# Patient Record
Sex: FEMALE | Race: WHITE | Employment: UNEMPLOYED | URBAN - METROPOLITAN AREA
[De-identification: names, ages, dates, MRNs, and addresses within clinical notes are randomized per-mention and may not be internally consistent; named-entity substitution may affect disease eponyms.]

---

## 2020-05-31 ENCOUNTER — APPOINTMENT (OUTPATIENT)
Dept: CT IMAGING | Facility: HOSPITAL | Age: 50
DRG: 330 | End: 2020-05-31
Attending: EMERGENCY MEDICINE
Payer: MEDICAID

## 2020-05-31 ENCOUNTER — HOSPITAL ENCOUNTER (INPATIENT)
Facility: HOSPITAL | Age: 50
LOS: 8 days | Discharge: HOME | DRG: 330 | End: 2020-06-08
Attending: EMERGENCY MEDICINE | Admitting: HOSPITALIST
Payer: MEDICAID

## 2020-05-31 DIAGNOSIS — K63.89 COLONIC MASS: ICD-10-CM

## 2020-05-31 DIAGNOSIS — R10.9 ABDOMINAL PAIN OF UNKNOWN ETIOLOGY: Primary | ICD-10-CM

## 2020-05-31 PROCEDURE — 74177 CT ABD & PELVIS W/CONTRAST: CPT | Performed by: EMERGENCY MEDICINE

## 2020-05-31 PROCEDURE — 99253 IP/OBS CNSLTJ NEW/EST LOW 45: CPT | Performed by: INTERNAL MEDICINE

## 2020-05-31 PROCEDURE — 99223 1ST HOSP IP/OBS HIGH 75: CPT | Performed by: HOSPITALIST

## 2020-05-31 RX ORDER — POLYETHYLENE GLYCOL 3350 17 G/17G
17 POWDER, FOR SOLUTION ORAL DAILY PRN
Status: DISCONTINUED | OUTPATIENT
Start: 2020-05-31 | End: 2020-06-08

## 2020-05-31 RX ORDER — SODIUM CHLORIDE 9 MG/ML
INJECTION, SOLUTION INTRAVENOUS CONTINUOUS
Status: DISCONTINUED | OUTPATIENT
Start: 2020-05-31 | End: 2020-06-05

## 2020-05-31 RX ORDER — METOCLOPRAMIDE HYDROCHLORIDE 5 MG/ML
10 INJECTION INTRAMUSCULAR; INTRAVENOUS EVERY 8 HOURS PRN
Status: DISCONTINUED | OUTPATIENT
Start: 2020-05-31 | End: 2020-06-08

## 2020-05-31 RX ORDER — SODIUM CHLORIDE 0.9 % (FLUSH) 0.9 %
3 SYRINGE (ML) INJECTION AS NEEDED
Status: DISCONTINUED | OUTPATIENT
Start: 2020-05-31 | End: 2020-06-08

## 2020-05-31 RX ORDER — DIPHENHYDRAMINE HYDROCHLORIDE 50 MG/ML
25 INJECTION INTRAMUSCULAR; INTRAVENOUS ONCE
Status: COMPLETED | OUTPATIENT
Start: 2020-05-31 | End: 2020-05-31

## 2020-05-31 RX ORDER — MORPHINE SULFATE 4 MG/ML
4 INJECTION, SOLUTION INTRAMUSCULAR; INTRAVENOUS EVERY 2 HOUR PRN
Status: DISCONTINUED | OUTPATIENT
Start: 2020-05-31 | End: 2020-06-05

## 2020-05-31 RX ORDER — DEXTROSE MONOHYDRATE 25 G/50ML
50 INJECTION, SOLUTION INTRAVENOUS
Status: DISCONTINUED | OUTPATIENT
Start: 2020-05-31 | End: 2020-06-08

## 2020-05-31 RX ORDER — HYDROCODONE BITARTRATE AND ACETAMINOPHEN 5; 325 MG/1; MG/1
1 TABLET ORAL EVERY 4 HOURS PRN
Status: DISCONTINUED | OUTPATIENT
Start: 2020-05-31 | End: 2020-05-31

## 2020-05-31 RX ORDER — ONDANSETRON 2 MG/ML
4 INJECTION INTRAMUSCULAR; INTRAVENOUS EVERY 6 HOURS PRN
Status: DISCONTINUED | OUTPATIENT
Start: 2020-05-31 | End: 2020-06-05

## 2020-05-31 RX ORDER — POTASSIUM CHLORIDE 14.9 MG/ML
20 INJECTION INTRAVENOUS ONCE
Status: COMPLETED | OUTPATIENT
Start: 2020-05-31 | End: 2020-05-31

## 2020-05-31 RX ORDER — SODIUM PHOSPHATE, DIBASIC AND SODIUM PHOSPHATE, MONOBASIC 7; 19 G/133ML; G/133ML
1 ENEMA RECTAL ONCE AS NEEDED
Status: DISCONTINUED | OUTPATIENT
Start: 2020-05-31 | End: 2020-06-06

## 2020-05-31 RX ORDER — CETIRIZINE HYDROCHLORIDE 10 MG/1
10 TABLET ORAL DAILY
Status: DISCONTINUED | OUTPATIENT
Start: 2020-05-31 | End: 2020-06-08

## 2020-05-31 RX ORDER — ACETAMINOPHEN 325 MG/1
650 TABLET ORAL EVERY 4 HOURS PRN
Status: DISCONTINUED | OUTPATIENT
Start: 2020-05-31 | End: 2020-06-05

## 2020-05-31 RX ORDER — BISACODYL 10 MG
10 SUPPOSITORY, RECTAL RECTAL
Status: DISCONTINUED | OUTPATIENT
Start: 2020-05-31 | End: 2020-06-06

## 2020-05-31 RX ORDER — HEPARIN SODIUM 5000 [USP'U]/ML
5000 INJECTION, SOLUTION INTRAVENOUS; SUBCUTANEOUS EVERY 8 HOURS SCHEDULED
Status: DISCONTINUED | OUTPATIENT
Start: 2020-05-31 | End: 2020-06-05

## 2020-05-31 RX ORDER — METOCLOPRAMIDE HYDROCHLORIDE 5 MG/ML
10 INJECTION INTRAMUSCULAR; INTRAVENOUS ONCE
Status: COMPLETED | OUTPATIENT
Start: 2020-05-31 | End: 2020-05-31

## 2020-05-31 RX ORDER — CETIRIZINE HYDROCHLORIDE 10 MG/1
10 TABLET ORAL AS NEEDED
COMMUNITY

## 2020-05-31 RX ORDER — HYDROCODONE BITARTRATE AND ACETAMINOPHEN 5; 325 MG/1; MG/1
2 TABLET ORAL EVERY 4 HOURS PRN
Status: DISCONTINUED | OUTPATIENT
Start: 2020-05-31 | End: 2020-05-31

## 2020-05-31 RX ORDER — MORPHINE SULFATE 2 MG/ML
2 INJECTION, SOLUTION INTRAMUSCULAR; INTRAVENOUS EVERY 2 HOUR PRN
Status: DISCONTINUED | OUTPATIENT
Start: 2020-05-31 | End: 2020-06-05

## 2020-05-31 RX ORDER — DOCUSATE SODIUM 100 MG/1
100 CAPSULE, LIQUID FILLED ORAL 2 TIMES DAILY
Status: DISCONTINUED | OUTPATIENT
Start: 2020-05-31 | End: 2020-06-06

## 2020-05-31 RX ORDER — MORPHINE SULFATE 2 MG/ML
1 INJECTION, SOLUTION INTRAMUSCULAR; INTRAVENOUS EVERY 2 HOUR PRN
Status: DISCONTINUED | OUTPATIENT
Start: 2020-05-31 | End: 2020-06-05

## 2020-05-31 NOTE — CONSULTS
Gastroenterology consultation note    Reason for consultation:  Nausea, vomiting, abdominal pain, diarrhea, weight loss, abnormal CT scan      History of present illness:   The patient is a 47year old female who was diagnosed with diabetes in March 2020 an Number of children: Not on file      Years of education: Not on file      Highest education level: Not on file    Occupational History      Not on file    Social Needs      Financial resource strain: Not on file      Food insecurity:        Worry: Not on quadrant without definitive peritoneal signs. No definite mass, however, palpation is difficult secondary to tenderness. Liver and spleen are not palpable. Extremities: Without edema  Neurologic: Awake, alert and appropriate. Nonfocal examination.   Aff The patient's CT scan is reviewed which reveals a significant inflammatory versus neoplastic process of the right colon. I would favor the latter, however, Crohn's disease or less likely ischemic colitis cannot be excluded.   I doubt an infectious process

## 2020-05-31 NOTE — ED INITIAL ASSESSMENT (HPI)
Patient complain of left sided abdominal pain for over two weeks. Patient has a history of family having stomach cancer. + Nausea and diarrhea.

## 2020-05-31 NOTE — PLAN OF CARE
Nausea  managed with reglan. Walking independently. Golytely bowel prep started. Potassium replaced. IVF infusing. Plan for colonoscopy tomorrow. Bed in lowest position, call light in reach, fall precautions in place.

## 2020-05-31 NOTE — H&P
Marcus 87 Patient Status:  Inpatient    1970 MRN [id number]   Location [facility] 4W/SW/SE Attending Freedom Huerta MD   Hosp Day # 0 PCP Unknown Pcp     Date:  2020  Date of Admis negative    Physical Exam:   Vital Signs:  Blood pressure 131/81, pulse 100, temperature 98 °F (36.7 °C), temperature source Oral, resp. rate 16, height 4' 8\" (1.422 m), weight 130 lb (59 kg), SpO2 100 %.      GENERAL:  The patient appeared to be in no dis 1. Circumferential masslike wall thickening involving an approximate 10 cm segment of the cecum/ascending colon with surrounding inflammatory stranding and trace free fluid along the right paracolic gutter.   No free intraperitoneal air or drainable intra-a

## 2020-05-31 NOTE — ED PROVIDER NOTES
Patient Seen in: [facility] AND [facility] Emergency Department      History   Patient presents with:  Abdomen/Flank Pain    Stated Complaint: vomiting,abd pain    HPI    58-year-old female with history of diabetes here with complaints of vomiting, abdominal riley systems reviewed and negative except as noted above.     Physical Exam     ED Triage Vitals   BP 05/31/20 0728 135/85   Pulse 05/31/20 0728 103   Resp 05/31/20 0728 20   Temp 05/31/20 0729 98.2 °F (36.8 °C)   Temp src 05/31/20 0729 Oral   SpO2 05/31/20 0728 mg/dL    Sodium 132 (L) 136 - 145 mmol/L    Potassium      Chloride 94 (L) 98 - 112 mmol/L    CO2 29.0 21.0 - 32.0 mmol/L    Anion Gap 9 0 - 18 mmol/L    BUN 9 7 - 18 mg/dL    Creatinine 0.79 0.55 - 1.02 mg/dL    BUN/CREA Ratio 11.4 10.0 - 20.0    Calcium, infectious/inflammatory colitis. Colonoscopy correlation is suggested. 2. Multiple borderline enlarged right lower quadrant mesenteric lymph nodes, which may be reactive or metastatic in nature. 3. Fatty liver. 4. Cholecystectomy.   Mild extrahepatic bilia medications for this patient.

## 2020-06-01 PROCEDURE — 0DBK8ZX EXCISION OF ASCENDING COLON, VIA NATURAL OR ARTIFICIAL OPENING ENDOSCOPIC, DIAGNOSTIC: ICD-10-PCS | Performed by: INTERNAL MEDICINE

## 2020-06-01 PROCEDURE — 45385 COLONOSCOPY W/LESION REMOVAL: CPT | Performed by: INTERNAL MEDICINE

## 2020-06-01 PROCEDURE — 0DBL8ZX EXCISION OF TRANSVERSE COLON, VIA NATURAL OR ARTIFICIAL OPENING ENDOSCOPIC, DIAGNOSTIC: ICD-10-PCS | Performed by: INTERNAL MEDICINE

## 2020-06-01 PROCEDURE — 45380 COLONOSCOPY AND BIOPSY: CPT | Performed by: INTERNAL MEDICINE

## 2020-06-01 PROCEDURE — 45381 COLONOSCOPY SUBMUCOUS NJX: CPT | Performed by: INTERNAL MEDICINE

## 2020-06-01 PROCEDURE — 99233 SBSQ HOSP IP/OBS HIGH 50: CPT | Performed by: HOSPITALIST

## 2020-06-01 RX ORDER — MIDAZOLAM HYDROCHLORIDE 1 MG/ML
INJECTION INTRAMUSCULAR; INTRAVENOUS
Status: DISCONTINUED | OUTPATIENT
Start: 2020-06-01 | End: 2020-06-01 | Stop reason: HOSPADM

## 2020-06-01 NOTE — PLAN OF CARE
Pt A&Ox3, VSS, afebrile, no c/o pain, awaiting planned colonoscopy this afternoon. Remains NPO, IV's as per MD order.     Problem: Patient Centered Care  Goal: Patient preferences are identified and integrated in the patient's plan of care  Description  In

## 2020-06-01 NOTE — PROGRESS NOTES
[facility] [place]    Progress Note    Rachel Saul Patient Status:  Inpatient    1970 MRN [id number]   Location [facility] 4W/SW/SE Attending Jaclyn Michaud MD   [facility] Day # 1 PCP Unknown Pcp       Subjective:   Rachel Saul is feel quadrant mesenteric lymph nodes, which may be reactive or metastatic in nature. 3. Fatty liver. 4. Cholecystectomy. Mild extrahepatic biliary ductal dilation, which may relate to cholecystectomy state.   Request correlation with obstructive laboratory para

## 2020-06-01 NOTE — INTERVAL H&P NOTE
Pre-op Diagnosis: abd pain diarrhea abdnormalct scan    The above referenced H&P was reviewed by Antolin Ramos MD on 6/1/2020, the patient was examined and no significant changes have occurred in the patient's condition since the H&P was performed.

## 2020-06-01 NOTE — OPERATIVE REPORT
[facility] Endoscopy Report      Date of Procedure:  06/01/20      Preoperative Diagnosis:  1. Abdominal pain and diarrhea  2. Abnormal CT scan of the right colon      Postoperative Diagnosis:  1. Ascending colon mass  2.   Small transver vascular pattern. There was a pale 4 mm sessile polyp in the distal transverse colon which was cold snare excised and retrieved. No ongoing bleeding.   There were scattered diverticula seen from at least the distal transverse colon to the sigmoid without

## 2020-06-01 NOTE — CM/SW NOTE
Per nursing rounds pt is from home, will have colonoscopy today and will dc home once medically stable with no home care needs. Lauren Gallegos.  Alise Fam RN, BSN  Nurse   [phone]

## 2020-06-02 ENCOUNTER — APPOINTMENT (OUTPATIENT)
Dept: CT IMAGING | Facility: HOSPITAL | Age: 50
DRG: 330 | End: 2020-06-02
Attending: HOSPITALIST
Payer: MEDICAID

## 2020-06-02 ENCOUNTER — MED REC SCAN ONLY (OUTPATIENT)
Dept: GASTROENTEROLOGY | Facility: CLINIC | Age: 50
End: 2020-06-02

## 2020-06-02 PROCEDURE — 71260 CT THORAX DX C+: CPT | Performed by: HOSPITALIST

## 2020-06-02 PROCEDURE — 74177 CT ABD & PELVIS W/CONTRAST: CPT | Performed by: HOSPITALIST

## 2020-06-02 PROCEDURE — 99233 SBSQ HOSP IP/OBS HIGH 50: CPT | Performed by: HOSPITALIST

## 2020-06-02 PROCEDURE — 99232 SBSQ HOSP IP/OBS MODERATE 35: CPT | Performed by: INTERNAL MEDICINE

## 2020-06-02 RX ORDER — CYCLOBENZAPRINE HCL 10 MG
10 TABLET ORAL 3 TIMES DAILY PRN
Status: DISCONTINUED | OUTPATIENT
Start: 2020-06-02 | End: 2020-06-08

## 2020-06-02 RX ORDER — POTASSIUM CHLORIDE 20 MEQ/1
40 TABLET, EXTENDED RELEASE ORAL ONCE
Status: COMPLETED | OUTPATIENT
Start: 2020-06-02 | End: 2020-06-02

## 2020-06-02 NOTE — PROGRESS NOTES
[facility]    Progress Note    Doyle Connolly Patient Status:  Inpatient    1970 MRN [id number]   Location [facility] 4W/SW/SE Attending Aaron Becerra MD   [facility] Day # 2 PCP Unknown Pcp       Subjective:   Doyle mcgowan h mesenteric lymph nodes, which may be reactive or metastatic in nature. 3. Fatty liver. 4. Cholecystectomy. Mild extrahepatic biliary ductal dilation, which may relate to cholecystectomy state. Request correlation with obstructive laboratory parameters.  5

## 2020-06-02 NOTE — PLAN OF CARE
Patient is alert and oriented x4. Denies pain. Denies nausea. VSS. Tolerating clear liquid diet. Voiding freely. C. Diff negative, enteric precautions discontinued. Patient ambulates independently in room. Plan is to dc home when medically stable.     Prob

## 2020-06-02 NOTE — CONSULTS
[facility]    Patient Name:  Laurel Rondon   YOB: 1970   Gender:  Female   Appt Date:  5/31/2020   Provider:  No name on file. Insurance:  N/A     PATIENT PROVIDERS  Referring Provider: No ref.  pro thickening involving the cecum/ascending colon with surrounding inflammatory stranding and trace fluid along the right paracolic gutter. She was admitted for additional work-up.   Yesterday, she was taken to the GI suite for a lower endoscopy per Dr. Lord Martinez NAUSEA ONLY     History:  Reviewed:  Past Medical History:   Diagnosis Date   • Diabetes ([facility] [address]       Reviewed:  Past Surgical History:   Procedure Laterality Date   • CHOLECYSTECTOMY     • COLONOSCOPY N/A 6/1/2020    Performed by Erin Pedraza MD a Normal rate and regular rhythm. No murmur heard. Pulmonary/Chest: Effort normal and breath sounds normal. No respiratory distress. She has no wheezes. She has no rales. Abdominal: Soft. Bowel sounds are normal. She exhibits no distension.  There is no contrast material.  Automated exposure control for dose reduction was used. Adjustment of the mA and/or kV was done based on the patient's size. Use of iterative   reconstruction technique for dose reduction was used.   Dose information is transmitted to th abrupt caliber change of lumen. There is lymphadenopathy   seen within the right lower quadrant which measures up to 1.2 x 1 cm.   There is inflammatory fat stranding seen within the right lower quadrant with infiltration pericolonic fat and thickening of 6/02/2020 at 1:00 PM       Finalized by (CST): Caryn Felton MD on 6/02/2020 at 1:11 PM            Assessment / Plan:   This is a 60-year-old female who was present admitted to the inpatient service for work-up and management of a recent diagnosis of right-s

## 2020-06-03 DIAGNOSIS — K63.89 COLONIC MASS: Primary | ICD-10-CM

## 2020-06-03 PROCEDURE — 99233 SBSQ HOSP IP/OBS HIGH 50: CPT | Performed by: HOSPITALIST

## 2020-06-03 PROCEDURE — 99232 SBSQ HOSP IP/OBS MODERATE 35: CPT | Performed by: INTERNAL MEDICINE

## 2020-06-03 RX ORDER — POTASSIUM CHLORIDE 20 MEQ/1
40 TABLET, EXTENDED RELEASE ORAL EVERY 4 HOURS
Status: COMPLETED | OUTPATIENT
Start: 2020-06-03 | End: 2020-06-03

## 2020-06-03 NOTE — PROGRESS NOTES
[facility]    Progress Note    Rachle Saul Patient Status:  Inpatient    1970 MRN [id number]   Location [facility] 4W/SW/SE Attending Lisa Schmitt MD   Hosp Day # 3 PCP Unknown Pcp       Subjective:   Rachel Saul is h **OR** [DISCONTINUED] HYDROcodone-acetaminophen **OR** [DISCONTINUED] HYDROcodone-acetaminophen, morphINE sulfate **OR** morphINE sulfate **OR** morphINE sulfate, PEG 3350, magnesium hydroxide, bisacodyl, Fleet Enema, ondansetron HCl, Metoclopramide HCl, g neoplasm. Lymphadenopathy within the right lower quadrant may be reactive or metastatic and is unchanged. Inflammatory fat stranding within the right lower quadrant adjacent to the cecum and ascending colon is unchanged suspicious for colitis.   Extensive

## 2020-06-03 NOTE — PROGRESS NOTES
[facility]     Gastroenterology Progress Note    Laurel Rondon Patient Status:  Inpatient    1970 MRN [id number]   Location [facility] 4W/SW/SE Attending Amandeep Link MD   Hosp Day # 3 PCP Unknown Pcp       Asses gross movements of extremities normal  Affect:Normal      Results:     Recent Labs   Lab 06/01/20  0607 06/02/20  0533 06/03/20  0510   RBC 4.47 4.08 4.00   HGB 10.0* 9.3* 9.1*   HCT 32.5* 29.9* 29.1*   MCV 72.7* 73.3* 72.8*   MCH 22.4* 22.8* 22.8*   MCHC

## 2020-06-03 NOTE — PROGRESS NOTES
Surgical Oncology Inpatient Progress Note    Subjective:  No acute events, continued mild abdominal pain, passing gas, no emesis.      Objective:  Temp:  [98 °F (36.7 °C)-98.6 °F (37 °C)] 98.6 °F (37 °C)  Pulse:  [] 99  Resp:  [18-20] 18  BP: (133-153

## 2020-06-03 NOTE — PROGRESS NOTES
[facility]     Gastroenterology Progress Note    Elodia Regan Patient Status:  Inpatient    1970 MRN [id number]   Location [facility] 4W/SW/SE Attending Liu Hillman MD   Hosp Day # 2 PCP Unknown Pcp       Assessme nonobstructive. There is mild direct tenderness to palpation in the right lower quadrant. There are no masses appreciated. Liver and spleen are not palpable. Skin: No jaundice. Warm and dry. Ext: No cyanosis, clubbing or edema is evident.    Neuro- Al diverticulitis. Dictated by (CST): Katelynn Mares MD on 6/02/2020 at 1:00 PM     Finalized by (CST): Katelynn Mares MD on 6/02/2020 at 1:11 PM          Ct Abdomen Pelvis Iv Contrast, No Oral (er)    Result Date: 5/31/2020  CONCLUSION:  1Eliezer spivey

## 2020-06-03 NOTE — PLAN OF CARE
Patient is alert and oriented x4. Flexeril given for abdominal cramping. Denies nausea. VSS. Tolerating diet. Voiding freely. Patient ambulates independently in room. Plan is to dc home when medically stable.   Problem: Patient Centered Care  Goal: Patient

## 2020-06-03 NOTE — DIETARY NOTE
ADULT NUTRITION INITIAL ASSESSMENT    Pt is at high nutrition risk. Pt meets severe malnutrition criteria.       CRITERIA FOR MALNUTRITION DIAGNOSIS:  Criteria for severe malnutrition diagnosis: acute illness/injury related to wt loss greater than 2% in 1 Rational/use of oral supplements discussed.   - Vitamin and mineral supplements: Vit C and antioxident supplement  - Feeding assistance: meal set up  - Nutrition education: assess education needs prior to discharge, pt would be interested in information of Insulin Aspart Pen  1-5 Units Subcutaneous TID CC     LABS: reviewed  Recent Labs     06/01/20  0607 06/02/20  0533 06/03/20  0510   GLU 93 94 102*   BUN 4* 2* 1*   CREATSERUM 0.56 0.58 0.62   CA 8.1* 8.0* 7.8*    141 141   K 4.0 3.7 3.5    108

## 2020-06-04 PROCEDURE — 99232 SBSQ HOSP IP/OBS MODERATE 35: CPT | Performed by: HOSPITALIST

## 2020-06-04 RX ORDER — NEOMYCIN SULFATE 500 MG/1
1000 TABLET ORAL ONCE
Status: COMPLETED | OUTPATIENT
Start: 2020-06-04 | End: 2020-06-04

## 2020-06-04 RX ORDER — ACETAMINOPHEN 500 MG
1000 TABLET ORAL ONCE
Status: DISCONTINUED | OUTPATIENT
Start: 2020-06-05 | End: 2020-06-05 | Stop reason: HOSPADM

## 2020-06-04 RX ORDER — METRONIDAZOLE 500 MG/1
500 TABLET ORAL ONCE
Status: COMPLETED | OUTPATIENT
Start: 2020-06-04 | End: 2020-06-04

## 2020-06-04 RX ORDER — SODIUM CHLORIDE, SODIUM LACTATE, POTASSIUM CHLORIDE, CALCIUM CHLORIDE 600; 310; 30; 20 MG/100ML; MG/100ML; MG/100ML; MG/100ML
INJECTION, SOLUTION INTRAVENOUS CONTINUOUS
Status: DISCONTINUED | OUTPATIENT
Start: 2020-06-05 | End: 2020-06-05 | Stop reason: ALTCHOICE

## 2020-06-04 RX ORDER — SODIUM CHLORIDE 0.9 % (FLUSH) 0.9 %
10 SYRINGE (ML) INJECTION AS NEEDED
Status: DISCONTINUED | OUTPATIENT
Start: 2020-06-05 | End: 2020-06-05 | Stop reason: HOSPADM

## 2020-06-04 NOTE — PROGRESS NOTES
[facility]    Progress Note    Richard Days Patient Status:  Inpatient    1970 MRN [id number]   Location [facility] 4W/SW/SE Attending Ondina Haile MD   Hosp Day # 4 PCP Unknown Pcp       Subjective:   Richard Days is s Inpatient Meds:      [START ON 6/5/2020] Normal Saline Flush, cyclobenzaprine, Normal Saline Flush, acetaminophen **OR** [DISCONTINUED] HYDROcodone-acetaminophen **OR** [DISCONTINUED] HYDROcodone-acetaminophen, morphINE sulfate **OR** morphINE sulfate **OR hrs. N/A       Imaging/EKG:           Assessment and Plan:    Adenocarcinoma of cecum  - GI on board  - IV zofran and IV reglan for nausea  - Colonoscopy reviewed- showing a colonic mass, pathology revealed invasive poorly differentiated adenocarcinoma, pl

## 2020-06-04 NOTE — PLAN OF CARE
Patient is currently sleeping. Alert & oriented x4. Vital signs stable. No complaints of pain. Pt c/o nausea earlier in the night, relieved with zofran. IVF infusing. Tolerating clear liquid diet. Accu AC/HS. Voiding within normal limits.  Up independently

## 2020-06-04 NOTE — PLAN OF CARE
Problem: Patient Centered Care  Goal: Patient preferences are identified and integrated in the patient's plan of care  Description  Interventions:  - What would you like us to know as we care for you?   - Provide timely, complete, and accurate informatio Manage/alleviate anxiety  - Utilize distraction and/or relaxation techniques  - Monitor for opioid side effects  - Notify MD/LIP if interventions unsuccessful or patient reports new pain  - Anticipate increased pain with activity and pre-medicate as approp or complex needs related to functional status, cognitive ability or social support system  Outcome: Progressing     Plan of care followed. Medications given via MAR. pain controlled. Tolerating diet. Ambulate in room and halls.    Surgical prep started

## 2020-06-04 NOTE — PROGRESS NOTES
Surgical Oncology Inpatient Progress Note    Subjective:  No acute events. VSS.  Hemodynamically stable    Objective:  Temp:  [98.9 °F (37.2 °C)-99.4 °F (37.4 °C)] 98.9 °F (37.2 °C)  Pulse:  [] 93  Resp:  [18-20] 20  BP: (126-137)/(63-84) 130/84    In [place]  Antibiotics OCTOR  All questions answered    Lucas Grissom MD  Complex General Surgical Oncology  [facility]  Pager [phone]  [email or web address]

## 2020-06-05 ENCOUNTER — TELEPHONE (OUTPATIENT)
Dept: SURGERY | Facility: CLINIC | Age: 50
End: 2020-06-05

## 2020-06-05 ENCOUNTER — ANESTHESIA (OUTPATIENT)
Dept: SURGERY | Facility: HOSPITAL | Age: 50
DRG: 330 | End: 2020-06-05
Payer: MEDICAID

## 2020-06-05 ENCOUNTER — ANESTHESIA EVENT (OUTPATIENT)
Dept: SURGERY | Facility: HOSPITAL | Age: 50
DRG: 330 | End: 2020-06-05
Payer: MEDICAID

## 2020-06-05 PROCEDURE — P9045 ALBUMIN (HUMAN), 5%, 250 ML: HCPCS | Performed by: NURSE ANESTHETIST, CERTIFIED REGISTERED

## 2020-06-05 PROCEDURE — 8E0W4CZ ROBOTIC ASSISTED PROCEDURE OF TRUNK REGION, PERCUTANEOUS ENDOSCOPIC APPROACH: ICD-10-PCS | Performed by: SURGERY

## 2020-06-05 PROCEDURE — 0DTF4ZZ RESECTION OF RIGHT LARGE INTESTINE, PERCUTANEOUS ENDOSCOPIC APPROACH: ICD-10-PCS | Performed by: SURGERY

## 2020-06-05 RX ORDER — LIDOCAINE HYDROCHLORIDE ANHYDROUS AND DEXTROSE MONOHYDRATE .8; 5 G/100ML; G/100ML
INJECTION, SOLUTION INTRAVENOUS CONTINUOUS PRN
Status: DISCONTINUED | OUTPATIENT
Start: 2020-06-05 | End: 2020-06-05 | Stop reason: SURG

## 2020-06-05 RX ORDER — MORPHINE SULFATE 10 MG/ML
6 INJECTION, SOLUTION INTRAMUSCULAR; INTRAVENOUS EVERY 10 MIN PRN
Status: DISCONTINUED | OUTPATIENT
Start: 2020-06-05 | End: 2020-06-05 | Stop reason: HOSPADM

## 2020-06-05 RX ORDER — METRONIDAZOLE 500 MG/100ML
500 INJECTION, SOLUTION INTRAVENOUS EVERY 8 HOURS
Status: COMPLETED | OUTPATIENT
Start: 2020-06-05 | End: 2020-06-06

## 2020-06-05 RX ORDER — INDOCYANINE GREEN AND WATER 25 MG
KIT INJECTION AS NEEDED
Status: DISCONTINUED | OUTPATIENT
Start: 2020-06-05 | End: 2020-06-05 | Stop reason: SURG

## 2020-06-05 RX ORDER — PHENYLEPHRINE HCL 10 MG/ML
VIAL (ML) INJECTION AS NEEDED
Status: DISCONTINUED | OUTPATIENT
Start: 2020-06-05 | End: 2020-06-05 | Stop reason: SURG

## 2020-06-05 RX ORDER — HYDROCODONE BITARTRATE AND ACETAMINOPHEN 5; 325 MG/1; MG/1
2 TABLET ORAL AS NEEDED
Status: DISCONTINUED | OUTPATIENT
Start: 2020-06-05 | End: 2020-06-05 | Stop reason: HOSPADM

## 2020-06-05 RX ORDER — CEFAZOLIN SODIUM/WATER 2 G/20 ML
2 SYRINGE (ML) INTRAVENOUS ONCE
Status: DISCONTINUED | OUTPATIENT
Start: 2020-06-05 | End: 2020-06-05 | Stop reason: HOSPADM

## 2020-06-05 RX ORDER — ACETAMINOPHEN 500 MG
1000 TABLET ORAL EVERY 6 HOURS PRN
Status: DISCONTINUED | OUTPATIENT
Start: 2020-06-05 | End: 2020-06-05

## 2020-06-05 RX ORDER — HYDROMORPHONE HYDROCHLORIDE 1 MG/ML
0.2 INJECTION, SOLUTION INTRAMUSCULAR; INTRAVENOUS; SUBCUTANEOUS EVERY 2 HOUR PRN
Status: DISCONTINUED | OUTPATIENT
Start: 2020-06-05 | End: 2020-06-08

## 2020-06-05 RX ORDER — ROCURONIUM BROMIDE 10 MG/ML
INJECTION, SOLUTION INTRAVENOUS AS NEEDED
Status: DISCONTINUED | OUTPATIENT
Start: 2020-06-05 | End: 2020-06-05 | Stop reason: SURG

## 2020-06-05 RX ORDER — OXYCODONE HYDROCHLORIDE 5 MG/1
5 TABLET ORAL EVERY 4 HOURS PRN
Status: DISCONTINUED | OUTPATIENT
Start: 2020-06-05 | End: 2020-06-08

## 2020-06-05 RX ORDER — HYDROMORPHONE HYDROCHLORIDE 1 MG/ML
0.2 INJECTION, SOLUTION INTRAMUSCULAR; INTRAVENOUS; SUBCUTANEOUS EVERY 5 MIN PRN
Status: DISCONTINUED | OUTPATIENT
Start: 2020-06-05 | End: 2020-06-05 | Stop reason: HOSPADM

## 2020-06-05 RX ORDER — PROCHLORPERAZINE EDISYLATE 5 MG/ML
5 INJECTION INTRAMUSCULAR; INTRAVENOUS ONCE AS NEEDED
Status: DISCONTINUED | OUTPATIENT
Start: 2020-06-05 | End: 2020-06-05 | Stop reason: HOSPADM

## 2020-06-05 RX ORDER — HYDROMORPHONE HYDROCHLORIDE 1 MG/ML
0.6 INJECTION, SOLUTION INTRAMUSCULAR; INTRAVENOUS; SUBCUTANEOUS EVERY 5 MIN PRN
Status: DISCONTINUED | OUTPATIENT
Start: 2020-06-05 | End: 2020-06-05 | Stop reason: HOSPADM

## 2020-06-05 RX ORDER — ENOXAPARIN SODIUM 100 MG/ML
40 INJECTION SUBCUTANEOUS DAILY
Status: DISCONTINUED | OUTPATIENT
Start: 2020-06-06 | End: 2020-06-08

## 2020-06-05 RX ORDER — ONDANSETRON 2 MG/ML
4 INJECTION INTRAMUSCULAR; INTRAVENOUS ONCE AS NEEDED
Status: DISCONTINUED | OUTPATIENT
Start: 2020-06-05 | End: 2020-06-05 | Stop reason: HOSPADM

## 2020-06-05 RX ORDER — SODIUM CHLORIDE, SODIUM LACTATE, POTASSIUM CHLORIDE, CALCIUM CHLORIDE 600; 310; 30; 20 MG/100ML; MG/100ML; MG/100ML; MG/100ML
INJECTION, SOLUTION INTRAVENOUS CONTINUOUS
Status: DISCONTINUED | OUTPATIENT
Start: 2020-06-05 | End: 2020-06-05 | Stop reason: HOSPADM

## 2020-06-05 RX ORDER — SODIUM CHLORIDE, SODIUM LACTATE, POTASSIUM CHLORIDE, CALCIUM CHLORIDE 600; 310; 30; 20 MG/100ML; MG/100ML; MG/100ML; MG/100ML
INJECTION, SOLUTION INTRAVENOUS CONTINUOUS
Status: DISCONTINUED | OUTPATIENT
Start: 2020-06-05 | End: 2020-06-06

## 2020-06-05 RX ORDER — DEXTROSE MONOHYDRATE 25 G/50ML
50 INJECTION, SOLUTION INTRAVENOUS
Status: DISCONTINUED | OUTPATIENT
Start: 2020-06-05 | End: 2020-06-05 | Stop reason: HOSPADM

## 2020-06-05 RX ORDER — KETOROLAC TROMETHAMINE 15 MG/ML
15 INJECTION, SOLUTION INTRAMUSCULAR; INTRAVENOUS EVERY 6 HOURS PRN
Status: DISCONTINUED | OUTPATIENT
Start: 2020-06-05 | End: 2020-06-06

## 2020-06-05 RX ORDER — LIDOCAINE HYDROCHLORIDE 10 MG/ML
INJECTION, SOLUTION EPIDURAL; INFILTRATION; INTRACAUDAL; PERINEURAL AS NEEDED
Status: DISCONTINUED | OUTPATIENT
Start: 2020-06-05 | End: 2020-06-05 | Stop reason: SURG

## 2020-06-05 RX ORDER — HALOPERIDOL 5 MG/ML
0.25 INJECTION INTRAMUSCULAR ONCE AS NEEDED
Status: DISCONTINUED | OUTPATIENT
Start: 2020-06-05 | End: 2020-06-05 | Stop reason: HOSPADM

## 2020-06-05 RX ORDER — DEXAMETHASONE SODIUM PHOSPHATE 4 MG/ML
VIAL (ML) INJECTION AS NEEDED
Status: DISCONTINUED | OUTPATIENT
Start: 2020-06-05 | End: 2020-06-05 | Stop reason: SURG

## 2020-06-05 RX ORDER — HEPARIN SODIUM 5000 [USP'U]/ML
5000 INJECTION, SOLUTION INTRAVENOUS; SUBCUTANEOUS ONCE
Status: COMPLETED | OUTPATIENT
Start: 2020-06-05 | End: 2020-06-05

## 2020-06-05 RX ORDER — ALBUMIN, HUMAN INJ 5% 5 %
SOLUTION INTRAVENOUS CONTINUOUS PRN
Status: DISCONTINUED | OUTPATIENT
Start: 2020-06-05 | End: 2020-06-05 | Stop reason: SURG

## 2020-06-05 RX ORDER — KETAMINE HYDROCHLORIDE 50 MG/ML
INJECTION, SOLUTION, CONCENTRATE INTRAMUSCULAR; INTRAVENOUS AS NEEDED
Status: DISCONTINUED | OUTPATIENT
Start: 2020-06-05 | End: 2020-06-05 | Stop reason: SURG

## 2020-06-05 RX ORDER — ACETAMINOPHEN 10 MG/ML
1000 INJECTION, SOLUTION INTRAVENOUS EVERY 6 HOURS
Status: DISCONTINUED | OUTPATIENT
Start: 2020-06-05 | End: 2020-06-06

## 2020-06-05 RX ORDER — METRONIDAZOLE 500 MG/100ML
500 INJECTION, SOLUTION INTRAVENOUS ONCE
Status: COMPLETED | OUTPATIENT
Start: 2020-06-05 | End: 2020-06-05

## 2020-06-05 RX ORDER — MIDAZOLAM HYDROCHLORIDE 1 MG/ML
INJECTION INTRAMUSCULAR; INTRAVENOUS AS NEEDED
Status: DISCONTINUED | OUTPATIENT
Start: 2020-06-05 | End: 2020-06-05 | Stop reason: SURG

## 2020-06-05 RX ORDER — MORPHINE SULFATE 4 MG/ML
2 INJECTION, SOLUTION INTRAMUSCULAR; INTRAVENOUS EVERY 10 MIN PRN
Status: DISCONTINUED | OUTPATIENT
Start: 2020-06-05 | End: 2020-06-05 | Stop reason: HOSPADM

## 2020-06-05 RX ORDER — MORPHINE SULFATE 4 MG/ML
4 INJECTION, SOLUTION INTRAMUSCULAR; INTRAVENOUS EVERY 10 MIN PRN
Status: DISCONTINUED | OUTPATIENT
Start: 2020-06-05 | End: 2020-06-05 | Stop reason: HOSPADM

## 2020-06-05 RX ORDER — BUPIVACAINE HYDROCHLORIDE 5 MG/ML
INJECTION, SOLUTION EPIDURAL; INTRACAUDAL AS NEEDED
Status: DISCONTINUED | OUTPATIENT
Start: 2020-06-05 | End: 2020-06-05 | Stop reason: HOSPADM

## 2020-06-05 RX ORDER — NALOXONE HYDROCHLORIDE 0.4 MG/ML
80 INJECTION, SOLUTION INTRAMUSCULAR; INTRAVENOUS; SUBCUTANEOUS AS NEEDED
Status: DISCONTINUED | OUTPATIENT
Start: 2020-06-05 | End: 2020-06-05 | Stop reason: HOSPADM

## 2020-06-05 RX ORDER — ONDANSETRON 2 MG/ML
INJECTION INTRAMUSCULAR; INTRAVENOUS AS NEEDED
Status: DISCONTINUED | OUTPATIENT
Start: 2020-06-05 | End: 2020-06-05 | Stop reason: SURG

## 2020-06-05 RX ORDER — HYDROMORPHONE HYDROCHLORIDE 1 MG/ML
0.4 INJECTION, SOLUTION INTRAMUSCULAR; INTRAVENOUS; SUBCUTANEOUS EVERY 5 MIN PRN
Status: DISCONTINUED | OUTPATIENT
Start: 2020-06-05 | End: 2020-06-05 | Stop reason: HOSPADM

## 2020-06-05 RX ORDER — HYDROCODONE BITARTRATE AND ACETAMINOPHEN 5; 325 MG/1; MG/1
1 TABLET ORAL AS NEEDED
Status: DISCONTINUED | OUTPATIENT
Start: 2020-06-05 | End: 2020-06-05 | Stop reason: HOSPADM

## 2020-06-05 RX ORDER — LIDOCAINE HYDROCHLORIDE AND EPINEPHRINE 10; 10 MG/ML; UG/ML
INJECTION, SOLUTION INFILTRATION; PERINEURAL AS NEEDED
Status: DISCONTINUED | OUTPATIENT
Start: 2020-06-05 | End: 2020-06-05 | Stop reason: HOSPADM

## 2020-06-05 RX ORDER — ONDANSETRON 2 MG/ML
4 INJECTION INTRAMUSCULAR; INTRAVENOUS EVERY 6 HOURS PRN
Status: DISCONTINUED | OUTPATIENT
Start: 2020-06-05 | End: 2020-06-08

## 2020-06-05 RX ADMIN — PHENYLEPHRINE HCL 50 MCG: 10 MG/ML VIAL (ML) INJECTION at 08:46:00

## 2020-06-05 RX ADMIN — SODIUM CHLORIDE: 9 INJECTION, SOLUTION INTRAVENOUS at 10:42:00

## 2020-06-05 RX ADMIN — SODIUM CHLORIDE, SODIUM LACTATE, POTASSIUM CHLORIDE, CALCIUM CHLORIDE: 600; 310; 30; 20 INJECTION, SOLUTION INTRAVENOUS at 10:40:00

## 2020-06-05 RX ADMIN — PHENYLEPHRINE HCL 50 MCG: 10 MG/ML VIAL (ML) INJECTION at 09:36:00

## 2020-06-05 RX ADMIN — SODIUM CHLORIDE: 9 INJECTION, SOLUTION INTRAVENOUS at 09:44:00

## 2020-06-05 RX ADMIN — SODIUM CHLORIDE, SODIUM LACTATE, POTASSIUM CHLORIDE, CALCIUM CHLORIDE: 600; 310; 30; 20 INJECTION, SOLUTION INTRAVENOUS at 11:41:00

## 2020-06-05 RX ADMIN — SODIUM CHLORIDE, SODIUM LACTATE, POTASSIUM CHLORIDE, CALCIUM CHLORIDE: 600; 310; 30; 20 INJECTION, SOLUTION INTRAVENOUS at 10:42:00

## 2020-06-05 RX ADMIN — PHENYLEPHRINE HCL 50 MCG: 10 MG/ML VIAL (ML) INJECTION at 09:38:00

## 2020-06-05 RX ADMIN — LIDOCAINE HYDROCHLORIDE ANHYDROUS AND DEXTROSE MONOHYDRATE 1 MG/MIN: .8; 5 INJECTION, SOLUTION INTRAVENOUS at 07:55:00

## 2020-06-05 RX ADMIN — MIDAZOLAM HYDROCHLORIDE 2 MG: 1 INJECTION INTRAMUSCULAR; INTRAVENOUS at 07:35:00

## 2020-06-05 RX ADMIN — ROCURONIUM BROMIDE 5 MG: 10 INJECTION, SOLUTION INTRAVENOUS at 10:34:00

## 2020-06-05 RX ADMIN — LIDOCAINE HYDROCHLORIDE ANHYDROUS AND DEXTROSE MONOHYDRATE 1 MG/MIN: .8; 5 INJECTION, SOLUTION INTRAVENOUS at 11:11:00

## 2020-06-05 RX ADMIN — INDOCYANINE GREEN AND WATER 5 MG: 25 MG KIT INJECTION at 11:01:00

## 2020-06-05 RX ADMIN — ROCURONIUM BROMIDE 25 MG: 10 INJECTION, SOLUTION INTRAVENOUS at 07:55:00

## 2020-06-05 RX ADMIN — METRONIDAZOLE 500 MG: 500 INJECTION, SOLUTION INTRAVENOUS at 07:34:00

## 2020-06-05 RX ADMIN — PHENYLEPHRINE HCL 100 MCG: 10 MG/ML VIAL (ML) INJECTION at 08:09:00

## 2020-06-05 RX ADMIN — ROCURONIUM BROMIDE 5 MG: 10 INJECTION, SOLUTION INTRAVENOUS at 09:49:00

## 2020-06-05 RX ADMIN — SODIUM CHLORIDE: 9 INJECTION, SOLUTION INTRAVENOUS at 11:41:00

## 2020-06-05 RX ADMIN — KETAMINE HYDROCHLORIDE 30 MG: 50 INJECTION, SOLUTION, CONCENTRATE INTRAMUSCULAR; INTRAVENOUS at 08:10:00

## 2020-06-05 RX ADMIN — ROCURONIUM BROMIDE 5 MG: 10 INJECTION, SOLUTION INTRAVENOUS at 07:45:00

## 2020-06-05 RX ADMIN — PHENYLEPHRINE HCL 100 MCG: 10 MG/ML VIAL (ML) INJECTION at 07:53:00

## 2020-06-05 RX ADMIN — LIDOCAINE HYDROCHLORIDE 50 MG: 10 INJECTION, SOLUTION EPIDURAL; INFILTRATION; INTRACAUDAL; PERINEURAL at 07:45:00

## 2020-06-05 RX ADMIN — SODIUM CHLORIDE: 9 INJECTION, SOLUTION INTRAVENOUS at 07:52:00

## 2020-06-05 RX ADMIN — ONDANSETRON 4 MG: 2 INJECTION INTRAMUSCULAR; INTRAVENOUS at 11:39:00

## 2020-06-05 RX ADMIN — ALBUMIN, HUMAN INJ 5%: 5 SOLUTION INTRAVENOUS at 10:41:00

## 2020-06-05 RX ADMIN — PHENYLEPHRINE HCL 50 MCG: 10 MG/ML VIAL (ML) INJECTION at 08:01:00

## 2020-06-05 RX ADMIN — INDOCYANINE GREEN AND WATER 5 MG: 25 MG KIT INJECTION at 10:29:00

## 2020-06-05 RX ADMIN — DEXAMETHASONE SODIUM PHOSPHATE 4 MG: 4 MG/ML VIAL (ML) INJECTION at 08:10:00

## 2020-06-05 NOTE — ANESTHESIA PROCEDURE NOTES
Airway  Urgency: Elective      General Information and Staff    Patient location during procedure: OR  Anesthesiologist: Yris Lau MD  Resident/CRNA: Kendall Zamora CRNA  Performed: CRNA     Indications and Patient Condition  Indications for airwa

## 2020-06-05 NOTE — ANESTHESIA POSTPROCEDURE EVALUATION
Patient: Rachel Saul    Procedure Summary     Date:  06/05/20 Room / Location:  [facility] OR 07 / [facility] OR    Anesthesia Start:  8719 Anesthesia Stop:  2082    Procedure:  XI ROBOT-ASSISTED LAPAROSCOPIC RIGHT COLON RESECTION (Right ) Diagnosis:       Co

## 2020-06-05 NOTE — PLAN OF CARE
Pt came up from PACU around 1500. Pt is A&Ox4, but drowsy. VSS on room air. IV fluids infusing. Scheduled flagyl and rocephin given. Complaining of severe abdominal pain, PRN dilaudid given. Lap sites are C/D/I. Lind in place, draining adequately.  Started dilaudid  - PRN oxy IR   - See additional Care Plan goals for specific interventions    Outcome: Progressing     Problem: PAIN - ADULT  Goal: Verbalizes/displays adequate comfort level or patient's stated pain goal  Description  INTERVENTIONS:  - Encourage patient/family/discharge partner in discharge planning  - Arrange for needed discharge resources and transportation as appropriate  - Identify discharge learning needs (meds, wound care, etc)  - Arrange for interpreters to assist at discharge as needed  - to review patient's medication profile  - Implement strategies to promote bladder emptying  Outcome: Progressing     Problem: METABOLIC/FLUID AND ELECTROLYTES - ADULT  Goal: Electrolytes maintained within normal limits  Description  INTERVENTIONS:  - Monit

## 2020-06-05 NOTE — PAYOR COMM NOTE
--------------  ADMISSION REVIEW     Payor: [address] [place] [address] Road #:  [id number]  Authorization Number: [id number]    Admit date: 5/31/20  Admit time: 1135       Admitting Physician: Rodri Ashraf MD  Attending Physician:  Leonel Hough HENT: Negative for congestion. Eyes: Negative for visual disturbance. Respiratory: Negative for shortness of breath. Cardiovascular: Negative for chest pain. Gastrointestinal: Positive for diarrhea, nausea and vomiting.  Negative for abdominal riley none    DIAGNOSTICS:   Labs:  Recent Results (from the past 24 hour(s))   RAINBOW DRAW BLUE    Collection Time: 05/31/20  7:41 AM   Result Value Ref Range    Hold Blue Auto Resulted    RAINBOW DRAW LAVENDER    Collection Time: 05/31/20  7:41 AM   Result Va Immature Granulocyte % 0.5 %   REDRAW BMP    Collection Time: 05/31/20  8:55 AM   Result Value Ref Range    Potassium 3.1 (L) 3.5 - 5.1 mmol/L       Imaging Results Available and Reviewed by me while in ED:  Ct Abdomen Pelvis Iv Contrast, No Oral (er) Complicating Factors: The patient already has does not have a problem list on file. to contribute to the complexity of his ED evaluation.         EMERGENCY DEPARTMENT MEDICAL DECISION MAKING:  After obtaining the patient's history, performing the physical e Angy Doherty is a(n) 47year old female who has a pmh of DM2 who was admitted for worsening abdominal pain with nausea and vomiting for the past few weeks. She states she has been taking antinausea medications that haven't provided any releif.  She also rep HEENT:  Head was atraumatic and normocephalic. Eyes:  Extraocular muscles were intact. Sclera was anicteric. Pupils were equally reactive to light. Ears: There were no lesions. Nose:  No lesions were noted. Throat:   There was no thrush, no exudate, CONCLUSION:  1. Circumferential masslike wall thickening involving an approximate 10 cm segment of the cecum/ascending colon with surrounding inflammatory stranding and trace free fluid along the right paracolic gutter.   No free intraperitoneal air or drai 6/5/2020 1201 Given 17.5 mL (none) Manjeet Ogden MD      cefTRIAXone Sodium (ROCEPHIN) 2 g in sodium chloride 0.9% 100 mL MBP/ADD-vantage     Date Action Dose Route User    6/5/2020 0756 New Bag 2 g Intravenous Eveline Hdez CRNA      dexamethasone Date Action Dose Route User    6/5/2020 0810 Given 30 mg Intravenous Omid Echols CRNA      lactated ringers infusion     Date Action Dose Route User    6/5/2020 1040 New Bag (none) Intravenous Omid Echols CRNA    6/5/2020 7751 Continued by GILBERT 6/4/2020 1539 Given 1000 mg Oral Yelena Garcia, JAYLEN      Neomycin Sulfate (MYCIFRADIN) tab 1,000 mg     Date Action Dose Route User    6/4/2020 2119 Given 1000 mg Oral Tania Manzo RN      ondansetron HCl [facility]) injection 4 mg     Date Action Dose 6/5/2020 0000 Rate/Dose Verify (none) Intravenous Leela JAYLEN Machado      succinylcholine chloride (ANECTINE) injection     Date Action Dose Route User    6/5/2020 0746 Given 100 mg Intravenous Zacarias Kent CRNA      sugammadex Leidy Ora) 500 MG/5ML The patient has a history of biliary colic and biliary pancreatitis during her pregnancies in the late 1990s.   She describes an ERCP with removal of \"sludge\" followed by laparoscopic cholecystectomy.     There is a significant family history of gastroint Fear of current or ex partner: Not on file        Emotionally abused: Not on file        Physically abused: Not on file        Forced sexual activity: Not on file    Other Topics      Concerns:        Not on file    Social History Narrative      Not [address] [facility]  05/31/20                           Electronically signed by Maddy Armstrong MD at 5/31/2020 11:32 AM       ED to Hosp-Admission (Current) on 5/31/2020          Detailed Report      Chart Review: Note Routing History After informed consent, the patient was placed in the left lateral recumbent position. Digital rectal examination revealed no palpable intraluminal abnormalities.   An Olympus variable stiffness 190 series HD colonoscope was inserted into the rectum and ad Electronically signed by Renaldo Vázquez MD at 6/1/2020 12:51 PM       ED to Hosp-Admission (Current) on 5/31/2020          Detailed Report      Chart Review: Note Routing History     No routing history on file.    Namrata Teixeira MD   Physician   [facility] History, she tells me, dates back to May 10 [she recalls the exact date since it was Mother's Day] when the patient started experiencing vague gastrointestinal symptoms including nausea, emesis and diarrhea.   She was reportedly seen at an urgent care facil She was admitted for additional work-up. Yesterday, she was taken to the GI suite for a lower endoscopy per Dr. Melecio Cazares.   Colonic mucosa was largely unremarkable, in the ascending colon there was a circumferential mass described as ulcerated apple co Past Surgical History:   Procedure Laterality Date   • CHOLECYSTECTOMY       • COLONOSCOPY N/A 6/1/2020     Performed by Nell Leslie MD at [address] [place] [address] ENDOSCOPY       Reviewed Social History:  Social History    Socioeconomic History      Marital status: Pulmonary/Chest: Effort normal and breath sounds normal. No respiratory distress. She has no wheezes. She has no rales. Abdominal: Soft. Bowel sounds are normal. She exhibits no distension. There is no hepatosplenomegaly. There is no tenderness.        Ne TECHNIQUE:    CT images of the chest, abdomen and pelvis were obtained with intravenous contrast material.  Automated exposure control for dose reduction was used. Adjustment of the mA and/or kV was done based on the patient's size.  Use of iterative   jenny BOWEL:            Again visualized is masslike thickening involving the cecum and ascending colon measuring approximately 10.8 cm in length.  There is an apple-core lesion seen at the ascending colon with abrupt caliber change of lumen.  There is lymphaden Extensive wall thickening of the remainder of the ascending colon, transverse colon and proximal descending colon is new and suspicious for worsening colitis.  Given the degree of wall thickening with minimal adjacent inflammation, pseudomembranous   colit [email or web address]        Follow Up:  No follow-ups on file.         Electronically Signed by: No name on file.               Electronically signed by Marena Litten, MD at 6/2/2020  2:20 PM       ED to Hosp-Admission (Current) on 5/31/2020 Details of the operation:     The patient was brought to the operating room and laid supine on the operating table. General endotracheal anesthesia was induced without complication. The arms were tucked.   The abdomen was clipped prepped and draped in the I started by making an incision in the left subcostal region to accommodate an 8 mm robotic trocar. This was deepened through skin and subcutaneous tissue. Then, under direct vision, an optical port was introduced into the abdominal cavity.   Pneumoperito the mass was adherent to the lateral/anterior abdominal wall. An incision was made with electrocautery and the abdominal wall exposing the transversalis muscle. A portion of the abdominal wall was then taken and followed towards the retroperitoneum.   The The mass was grasped with a locking device. Pfannenstiel incision was then made by extending the suprapubic port laterally. It was deepened through skin subcutaneous tissue. The fascia was incised in a transverse fashion.   The abdominal cavity was enter

## 2020-06-05 NOTE — BRIEF OP NOTE
Pre-Operative Diagnosis: Colonic mass [K63.89]     Post-Operative Diagnosis: Colonic mass [K63.89]      Procedure Performed:   Procedure(s):  XI robot assisted right hemicolectomy    Surgeon(s) and Role:     * Pawel Clifford MD - Primary    Assistant(s):

## 2020-06-05 NOTE — PLAN OF CARE
Pt a/o x 4. Room air. Surgical drink at bed side - will take 2 hours pre procedure. Pt had episode of n/v. Mostly yellow colored - Zofran and Reglan given. NPO since midnight. 0.9NS infusing at 100ml/hr.    coming in, stated he will be here by 48 monitor pain and request assistance  - Assess pain using appropriate pain scale  - Administer analgesics based on type and severity of pain and evaluate response  - Implement non-pharmacological measures as appropriate and evaluate response  - Consider cul post-discharge preferences of patient/family/discharge partner  - Complete POLST form as appropriate  - Assess patient's ability to be responsible for managing their own health  - Refer to Case Management Department for coordinating discharge planning if t

## 2020-06-05 NOTE — PROGRESS NOTES
ED [facility]    Progress Note    Manan Lim Patient Status:  Inpatient    1970 MRN [id number]   Location [facility] 4W/SW/SE Attending Eyad Bernal MD   Hosp Day # 5 PCP Unknown Pcp     Attempted to see patient on 2 sep

## 2020-06-05 NOTE — ANESTHESIA PREPROCEDURE EVALUATION
Anesthesia PreOp Note    HPI:     Loreta Alvarado is a 47year old female who presents for preoperative consultation requested by: Inés Santiago MD    Date of Surgery: 6/5/2020    Procedure(s):  XI ROBOT-ASSISTED LAPAROSCOPIC RIGHT COLON RESECTION  Indicati 06/02/20 2052  [MAR Hold] Normal Saline Flush 0.9 % injection 3 mL, 3 mL, Intravenous, PRN, Shad Vital MD  0.9% NaCl infusion, , Intravenous, Continuous, Shad Vital MD, Last Rate: 100 mL/hr at 06/05/20 0000  [MAR Hold] Heparin Sodium 15 g, 15 g, Oral, Q15 Min PRN, Freedom Huerta MD    Or  [MAR Hold] Glucose-Vitamin C (DEX-4) chewable tab 4 tablet, 4 tablet, Oral, Q15 Min PRN, Freedom Huerta MD    Or  [facility] Hold] dextrose 50 % injection 50 mL, 50 mL, Intravenous, Q15 Min PRN, JINA Villareal current or ex partner: Not on file        Emotionally abused: Not on file        Physically abused: Not on file        Forced sexual activity: Not on file    Other Topics      Concerns:        Not on file    Social History Narrative      Not on file      A member of the nature of the anesthetic plan, benefits, risks including possible dental damage if relevant, major complications, and any alternative forms of anesthetic management. All of the patient's questions were answered to the best of my ability.  Karoline Crowley

## 2020-06-05 NOTE — OPERATIVE REPORT
Date of operation: 6/5/2020    Preoperative diagnosis:  1. History of ascending colon invasive poorly differentiated adenocarcinoma, endoscopically obstructing    Postoperative diagnosis:  1.   Same    Operations performed:  1.  Robotic assisted right gracie started by making an incision in the left subcostal region to accommodate an 8 mm robotic trocar. This was deepened through skin and subcutaneous tissue. Then, under direct vision, an optical port was introduced into the abdominal cavity.   Pneumoperitone mass was adherent to the lateral/anterior abdominal wall. An incision was made with electrocautery and the abdominal wall exposing the transversalis muscle. A portion of the abdominal wall was then taken and followed towards the retroperitoneum.   The rig was no bleeding. 35 cc of 1% lidocaine intermixed with 0.5% Marcaine with epinephrine were instilled. The wounds were irrigated thoroughly. 3-0 Vicryl was used to close the subcuticular layer and a running fashion.   Steri-Strips and sterile dressings we

## 2020-06-06 PROCEDURE — 99233 SBSQ HOSP IP/OBS HIGH 50: CPT | Performed by: HOSPITALIST

## 2020-06-06 RX ORDER — POTASSIUM CHLORIDE 14.9 MG/ML
20 INJECTION INTRAVENOUS ONCE
Status: DISCONTINUED | OUTPATIENT
Start: 2020-06-06 | End: 2020-06-08

## 2020-06-06 RX ORDER — DEXTROSE, SODIUM CHLORIDE, AND POTASSIUM CHLORIDE 5; .45; .15 G/100ML; G/100ML; G/100ML
INJECTION INTRAVENOUS CONTINUOUS
Status: DISCONTINUED | OUTPATIENT
Start: 2020-06-06 | End: 2020-06-08

## 2020-06-06 RX ORDER — PANTOPRAZOLE SODIUM 40 MG/1
40 TABLET, DELAYED RELEASE ORAL
Status: DISCONTINUED | OUTPATIENT
Start: 2020-06-06 | End: 2020-06-08

## 2020-06-06 RX ORDER — ACETAMINOPHEN 500 MG
1000 TABLET ORAL EVERY 6 HOURS
Status: DISCONTINUED | OUTPATIENT
Start: 2020-06-06 | End: 2020-06-08

## 2020-06-06 RX ORDER — MAGNESIUM SULFATE HEPTAHYDRATE 40 MG/ML
2 INJECTION, SOLUTION INTRAVENOUS ONCE
Status: DISCONTINUED | OUTPATIENT
Start: 2020-06-06 | End: 2020-06-06

## 2020-06-06 RX ORDER — PROCHLORPERAZINE EDISYLATE 5 MG/ML
10 INJECTION INTRAMUSCULAR; INTRAVENOUS EVERY 6 HOURS PRN
Status: DISCONTINUED | OUTPATIENT
Start: 2020-06-06 | End: 2020-06-08

## 2020-06-06 RX ORDER — MAGNESIUM SULFATE HEPTAHYDRATE 40 MG/ML
2 INJECTION, SOLUTION INTRAVENOUS ONCE
Status: COMPLETED | OUTPATIENT
Start: 2020-06-06 | End: 2020-06-06

## 2020-06-06 NOTE — PLAN OF CARE
Pt a/o x 4. Room air. Pt had 3 episode of n/v. PRN Zofran and Reglan given. Clear liquids;  LR infusing at 75ml/hr. 5 lap sites to abdomen - tegaderm and gauze; all clean dry and intact. Lind removed this morning   Schedules ofirmev for pain mgmt. ADULT  Goal: Verbalizes/displays adequate comfort level or patient's stated pain goal  Description  INTERVENTIONS:  - Encourage pt to monitor pain and request assistance  - Assess pain using appropriate pain scale  - Administer analgesics based on type and Identify discharge learning needs (meds, wound care, etc)  - Arrange for interpreters to assist at discharge as needed  - Consider post-discharge preferences of patient/family/discharge partner  - Complete POLST form as appropriate  - Assess patient's abil METABOLIC/FLUID AND ELECTROLYTES - ADULT  Goal: Electrolytes maintained within normal limits  Description  INTERVENTIONS:  - Monitor labs and rhythm and assess patient for signs and symptoms of electrolyte imbalances  - Administer electrolyte replacement a

## 2020-06-06 NOTE — PHYSICAL THERAPY NOTE
PHYSICAL THERAPY EVALUATION - INPATIENT     Room Number: 451/451-A  Evaluation Date: 6/6/2020  Type of Evaluation: Initial   Physician Order: PT Eval and Treat    Presenting Problem: abdominal pain with unexplained wt loss  Reason for Therapy: Mobility Dy re-educate;Strengthening;Stoop training;Stair training;Transfer training;Balance training  Rehab Potential : Good  Frequency (Obs): 5x/week       PHYSICAL THERAPY MEDICAL/SOCIAL HISTORY       Problem List  Principal Problem:    Abdominal pain of unknown et (e.g., wheelchair, bedside commode, etc.): A Little   -   Moving from lying on back to sitting on the side of the bed?: None   How much help from another person does the patient currently need. ..   -   Moving to and from a bed to a chair (including a wheel

## 2020-06-06 NOTE — PROGRESS NOTES
[facility]    Progress Note    Micaela Rofermin Patient Status:  Inpatient    1970 MRN [id number]   Location [facility] 4W/SW/SE Attending Wally Mayo MD   Hosp Day # 6 PCP Unknown Pcp       Subjective:   Micaela Rofermin was Current PRN Inpatient Meds:      Prochlorperazine Edisylate, ondansetron HCl, oxyCODONE HCl, HYDROmorphone HCl, cyclobenzaprine, Normal Saline Flush, PEG 3350, magnesium hydroxide, Metoclopramide HCl, glucose **OR** Glucose-Vitamin C **OR** dextrose found for: PT, INR    Culture:  Hospital Encounter on 05/31/20   1. URINE CULTURE, ROUTINE     Status: None    Collection Time: 05/31/20 10:36 AM   Result Value Ref Range    Urine Culture No Growth at 18-24 hrs.  N/A       Imaging/EKG:           Assessment

## 2020-06-06 NOTE — PROGRESS NOTES
Surgical Oncology Inpatient Progress Note    Subjective:  Nauseated overnight, small emesis. VSS, no fevers, WBC normal today. Hypokalemia.      Objective:  Temp:  [97.5 °F (36.4 °C)-98.6 °F (37 °C)] 97.5 °F (36.4 °C)  Pulse:  [] 77  Resp:  [12-31] 18

## 2020-06-07 ENCOUNTER — APPOINTMENT (OUTPATIENT)
Dept: ULTRASOUND IMAGING | Facility: HOSPITAL | Age: 50
DRG: 330 | End: 2020-06-07
Attending: SURGERY
Payer: MEDICAID

## 2020-06-07 PROCEDURE — 93971 EXTREMITY STUDY: CPT | Performed by: SURGERY

## 2020-06-07 PROCEDURE — 99233 SBSQ HOSP IP/OBS HIGH 50: CPT | Performed by: HOSPITALIST

## 2020-06-07 NOTE — PLAN OF CARE
Problem: Patient Centered Care  Goal: Patient preferences are identified and integrated in the patient's plan of care  Description  Interventions:  - What would you like us to know as we care for you?  I live with my   - Provide timely, complete, a non-pharmacological measures as appropriate and evaluate response  - Consider cultural and social influences on pain and pain management  - Manage/alleviate anxiety  - Utilize distraction and/or relaxation techniques  - Monitor for opioid side effects  - N Refer to Case Management Department for coordinating discharge planning if the patient needs post-hospital services based on physician/LIP order or complex needs related to functional status, cognitive ability or social support system  Outcome: Progressing including rhythm and repeat lab results as appropriate  - Fluid restriction as ordered  - Instruct patient on fluid and nutrition restrictions as appropriate  Outcome: Progressing     Pt Has been tolerating soft diet with no N/V, poor appetite.  Sent for do

## 2020-06-07 NOTE — PROGRESS NOTES
Surgical Oncology Inpatient Progress Note    Subjective:  No issues overnight, VSS, slightly tachycardic, leukocytosis.     Objective:  Temp:  [98.2 °F (36.8 °C)-98.7 °F (37.1 °C)] 98.4 °F (36.9 °C)  Pulse:  [] 99  Resp:  [18] 18  BP: (110-140)/(67-83 Health  Pager [phone]  [email or web address]

## 2020-06-07 NOTE — PROGRESS NOTES
[facility]    Progress Note    Berta Vázquez Patient Status:  Inpatient    1970 MRN [id number]   Location [facility] 4W/SW/SE Attending Yunior Last MD   Hosp Day # 7 PCP Unknown Pcp       Subjective:   Berta Vázquez is f HCl, oxyCODONE HCl, HYDROmorphone HCl, cyclobenzaprine, Normal Saline Flush, PEG 3350, magnesium hydroxide, Metoclopramide HCl, glucose **OR** Glucose-Vitamin C **OR** dextrose **OR** glucose **OR** Glucose-Vitamin C    Results:     Lab Results   Component displayed. No results found for: PT, INR    Culture:  Hospital Encounter on 05/31/20   1. URINE CULTURE, ROUTINE     Status: None    Collection Time: 05/31/20 10:36 AM   Result Value Ref Range    Urine Culture No Growth at 18-24 hrs.  N/A       Imaging/

## 2020-06-07 NOTE — PLAN OF CARE
Pt is alert and oriented x4. 1 episode of emesis this shift PRN Compazine given with good relief. PRN Dilaudid given for pain management. Decreased appetite, accucheck ACHS. ERAS protocol maintained. Voiding. IVF. Tele in place.  Fall precautions maintained adequate comfort level or patient's stated pain goal  Description  INTERVENTIONS:  - Encourage pt to monitor pain and request assistance  - Assess pain using appropriate pain scale  - Administer analgesics based on type and severity of pain and evaluate re (meds, wound care, etc)  - Arrange for interpreters to assist at discharge as needed  - Consider post-discharge preferences of patient/family/discharge partner  - Complete POLST form as appropriate  - Assess patient's ability to be responsible for managing ADULT  Goal: Electrolytes maintained within normal limits  Description  INTERVENTIONS:  - Monitor labs and rhythm and assess patient for signs and symptoms of electrolyte imbalances  - Administer electrolyte replacement as ordered  - Monitor response to el

## 2020-06-07 NOTE — PLAN OF CARE
Problem: Patient Centered Care  Goal: Patient preferences are identified and integrated in the patient's plan of care  Description  Interventions:  - What would you like us to know as we care for you?  I live with my   - Provide timely, complete, a non-pharmacological measures as appropriate and evaluate response  - Consider cultural and social influences on pain and pain management  - Manage/alleviate anxiety  - Utilize distraction and/or relaxation techniques  - Monitor for opioid side effects  - N Refer to Case Management Department for coordinating discharge planning if the patient needs post-hospital services based on physician/LIP order or complex needs related to functional status, cognitive ability or social support system  Outcome: Progressing including rhythm and repeat lab results as appropriate  - Fluid restriction as ordered  - Instruct patient on fluid and nutrition restrictions as appropriate  Outcome: Progressing     Pt vital signs wnl. Pt very lethargic, drowsy. 2 episodes vomiting.  Pt v

## 2020-06-08 VITALS
SYSTOLIC BLOOD PRESSURE: 124 MMHG | WEIGHT: 130 LBS | OXYGEN SATURATION: 93 % | HEIGHT: 56 IN | BODY MASS INDEX: 29.25 KG/M2 | DIASTOLIC BLOOD PRESSURE: 80 MMHG | TEMPERATURE: 98 F | RESPIRATION RATE: 18 BRPM | HEART RATE: 93 BPM

## 2020-06-08 DIAGNOSIS — K63.89 COLONIC MASS: ICD-10-CM

## 2020-06-08 PROCEDURE — 99239 HOSP IP/OBS DSCHRG MGMT >30: CPT | Performed by: HOSPITALIST

## 2020-06-08 RX ORDER — NALOXONE HYDROCHLORIDE 4 MG/.1ML
4 SPRAY, METERED NASAL AS NEEDED
Qty: 1 KIT | Refills: 0 | Status: SHIPPED | OUTPATIENT
Start: 2020-06-08 | End: 2020-09-16 | Stop reason: ALTCHOICE

## 2020-06-08 RX ORDER — ENOXAPARIN SODIUM 100 MG/ML
40 INJECTION SUBCUTANEOUS DAILY
Qty: 25 SYRINGE | Refills: 0 | Status: SHIPPED | OUTPATIENT
Start: 2020-06-09 | End: 2020-06-08

## 2020-06-08 RX ORDER — ACETAMINOPHEN 500 MG
1000 TABLET ORAL EVERY 6 HOURS PRN
Qty: 60 TABLET | Refills: 0 | Status: ON HOLD | OUTPATIENT
Start: 2020-06-08 | End: 2020-07-22

## 2020-06-08 RX ORDER — OXYCODONE HYDROCHLORIDE 5 MG/1
5 TABLET ORAL EVERY 4 HOURS PRN
Qty: 20 TABLET | Refills: 0 | Status: SHIPPED | OUTPATIENT
Start: 2020-06-08 | End: 2020-06-17

## 2020-06-08 RX ORDER — ONDANSETRON 4 MG/1
4 TABLET, ORALLY DISINTEGRATING ORAL EVERY 8 HOURS PRN
Qty: 10 TABLET | Refills: 0 | Status: SHIPPED | OUTPATIENT
Start: 2020-06-08 | End: 2020-07-16

## 2020-06-08 RX ORDER — ENOXAPARIN SODIUM 100 MG/ML
40 INJECTION SUBCUTANEOUS DAILY
Qty: 25 SYRINGE | Refills: 0 | Status: SHIPPED | OUTPATIENT
Start: 2020-06-09 | End: 2020-07-03

## 2020-06-08 NOTE — DISCHARGE SUMMARY
[facility]    Discharge Summary    Sigrid Delarosa Patient Status:  Inpatient    1970 MRN [id number]   Location [facility] 4W/SW/SE Attending No att. providers found   Hosp Day # 8 PCP Unknown Pcp     Date of Admission:  no shortness of breath, palpitations, diaphoresis  - troponin was normal  - unlikely cardiac, likely related to insufflation and affect on diaphragm     Leukoytosis  - slightly down, likely reactive  - no fevers  - encourage IS     Hypokalemia  - resolved is unchanged suspicious for colitis. Extensive wall thickening of the remainder of the ascending colon, transverse colon and proximal descending colon is new and suspicious for worsening colitis.   Given the degree of wall thickening with minimal adjacent 06/06/2020    BILT 0.3 06/06/2020    TP 5.3 (L) 06/06/2020    AST 15 06/06/2020    ALT 10 (L) 06/06/2020    MG 2.2 06/08/2020    PHOS 1.7 (L) 06/08/2020    TROP <0.045 06/06/2020       Recent Labs   Lab 06/06/20  1508 06/07/20  0533 06/08/20  0519   RBC 3. Quantity:  1 kit  Refills:  0     ondansetron 4 MG Tbdp  Commonly known as:  ZOFRAN-ODT      Take 1 tablet (4 mg total) by mouth every 8 (eight) hours as needed for Nausea.    Quantity:  10 tablet  Refills:  0     oxyCODONE HCl 5 MG Tabs  Commonly known as: every 6 hours as needed for pain  • Call our office if the medication prescribed for you does not ease your pain. • Don't drive or drink alcohol while you're taking prescription pain medications.   • Take enough medication to do your recommended exercises they haven't fallen off within 10 days, you can take them off. If the area around your incision is red, puffy, or if you have any drainage from your incision, contact our office. Showering  You may shower 48 hours after surgery.  When you shower, use soap [address]    Please arrive at the following location:  [address] at [facility]: [address]  [place]  Please call us at [phone] if you are unable to make it to your appointment or if

## 2020-06-08 NOTE — PLAN OF CARE
Pt is A&Ox4, VSS on room air. Complaining of mild pain, PRN oxy given, provided relief. Tolerating low fiber/soft diet, but does not have a huge appetite. IV fluids infusing. Had 2 bowel movements this morning, continuing to pass gas. Voiding freely.  Teach additional Care Plan goals for specific interventions    Outcome: Adequate for Discharge     Problem: PAIN - ADULT  Goal: Verbalizes/displays adequate comfort level or patient's stated pain goal  Description  INTERVENTIONS:  - Encourage pt to monitor pain Include patient/family/discharge partner in discharge planning  - Arrange for needed discharge resources and transportation as appropriate  - Identify discharge learning needs (meds, wound care, etc)  - Arrange for interpreters to assist at discharge as ne care  - Consider collaborating with pharmacy to review patient's medication profile  - Implement strategies to promote bladder emptying  Outcome: Adequate for Discharge     Problem: METABOLIC/FLUID AND ELECTROLYTES - ADULT  Goal: Electrolytes maintained wi

## 2020-06-08 NOTE — PROGRESS NOTES
Surgical Oncology Inpatient Progress Note    Subjective:  No issues overnight, VSS.  Tolerating diet    Objective:  Temp:  [98.4 °F (36.9 °C)-98.7 °F (37.1 °C)] 98.4 °F (36.9 °C)  Pulse:  [] 93  Resp:  [18] 18  BP: (110-138)/(67-85) 124/80    Intake/O

## 2020-06-08 NOTE — CM/SW NOTE
SW received MDO for home health services. Pt is currently medicaid pending, may only be able to get 1-2 oswaldo healthcare visits at home. Additionally, pt does not have PCP listed.      SW spoke to pt via telephone, she confirms she used to have a PCP; how

## 2020-06-08 NOTE — PAYOR COMM NOTE
--------------  CONTINUED STAY REVIEW    Payor: MEDICAID PENDING  Subscriber #:  0  Authorization Number: N/A    Admit date: 5/31/20  Admit time: 1135    Admitting Physician: Elijah Benavides MD  Attending Physician:  Latrice Castaneda MD  Primary Care Given 40 mg Subcutaneous (Right Upper Arm) Fernanda Brown, RN      Insulin Aspart Pen (NOVOLOG) 100 UNIT/ML flexpen 1-5 Units     Date Action Dose Route User    6/7/2020 1908 Given 2 Units Subcutaneous (Left Upper Abdomen) Loreto Graff RN      lorier

## 2020-06-08 NOTE — PLAN OF CARE
Patient is alert & oriented x4. Room air. Vital signs stable. Tele in place, no calls received. Strict I&Os. Denies any pain or nausea this evening. Scheduled tylenol. Tolerating low fiber soft diet. Accu AC/HS. IVF infusing.  Voiding WNL. +Gas/-BM overnig Progressing     Problem: PAIN - ADULT  Goal: Verbalizes/displays adequate comfort level or patient's stated pain goal  Description  INTERVENTIONS:  - Encourage pt to monitor pain and request assistance  - Assess pain using appropriate pain scale  - Adminis transportation as appropriate  - Identify discharge learning needs (meds, wound care, etc)  - Arrange for interpreters to assist at discharge as needed  - Consider post-discharge preferences of patient/family/discharge partner  - Complete POLST form as vanessa Progressing     Problem: METABOLIC/FLUID AND ELECTROLYTES - ADULT  Goal: Electrolytes maintained within normal limits  Description  INTERVENTIONS:  - Monitor labs and rhythm and assess patient for signs and symptoms of electrolyte imbalances  - Administer

## 2020-06-08 NOTE — HOME CARE LIAISON
Received referral from Ernestine Healy for [facility] services oswaldo case. [facility] is unable to accept patient until she has a PCP and an appointment scheduled for hospital follow up visit. Made Rocio aguillon.

## 2020-06-09 ENCOUNTER — TELEPHONE (OUTPATIENT)
Dept: SURGERY | Facility: CLINIC | Age: 50
End: 2020-06-09

## 2020-06-09 NOTE — TELEPHONE ENCOUNTER
Called to check in on pt. She states she is doing well and has no complaints at this time. She agrees to call if anything changes. Confirmed appt/location for tomorrow.   Future Appointments   Date Time Provider Toshia Ford   6/10/2020  1:30 PM D

## 2020-06-09 NOTE — PAYOR COMM NOTE
--------------  DISCHARGE REVIEW    Payor: MEDICAID PENDING  Subscriber #:  0  Authorization Number: N/A    Admit date: 5/31/20  Admit time:  7580  Discharge Date: 6/8/2020 12:50 PM     Admitting Physician: Miri Joseph MD  Attending Physician:  Raquel - Colonoscopy - showing a colonic mass, pathology revealed invasive poorly differentiated adenocarcinoma, please see report  - surgical oncology following  - CT of chest abdomen and pelvis locally advanced disease, but no evidence of distant metastases. Pl Us Venous Doppler Arm Right - Diag Img   Result Date: 6/7/2020  CONCLUSION:   Superficial thrombophlebitis involving the cephalic vein within the forearm. No DVT within the right upper extremity.         Ct Chest+abdomen+pelvis(all Cntrst Only)(cpt=71260/7 WBC 11.1 (H) 06/08/2020    HGB 8.9 (L) 06/08/2020    HCT 29.2 (L) 06/08/2020    .0 (H) 06/08/2020    CREATSERUM 0.53 (L) 06/08/2020    BUN 1 (L) 06/08/2020     06/08/2020    K 3.7 06/08/2020     06/08/2020    CO2 31.0 06/08/2020    GLU Start taking on:  June 9, 2020      Inject 0.4 mL (40 mg total) into the skin daily for 24 days. Stop taking on:  July 3, 2020  Quantity:  25 Syringe  Refills:  0     Naloxone HCl 4 MG/0.1ML Liqd      4 mg by Nasal route as needed.  If patient remains unr Harika Valdez MD Consulting Physician  GASTROENTEROLOGY          Discharge instructions:  Colon Surgery Discharge Instructions  Thank you for choosing the Surgical Oncology team at [facility].     Managing Your Pain  Follow the guidelin Caring for Your Incision  It's normal for the skin below your incision to feel numb. This happens because some of the nerves were cut during your surgery. The numbness will go away over time.   The sutures (stitches) are dissolvable and will not need to be ? Sudden increase in pain or new pain  ? Shaking chills  ? Fast pulse  ? Shortness of breath or chest pain  ? Nausea or vomiting. ? Diarrhea  ? Constipation that isn't relieved in 2 days  ?  Signs of a bladder infection (urinating more often than usual, b

## 2020-06-10 ENCOUNTER — OFFICE VISIT (OUTPATIENT)
Dept: HEMATOLOGY/ONCOLOGY | Facility: HOSPITAL | Age: 50
End: 2020-06-10
Attending: INTERNAL MEDICINE
Payer: COMMERCIAL

## 2020-06-10 ENCOUNTER — OFFICE VISIT (OUTPATIENT)
Dept: SURGERY | Facility: CLINIC | Age: 50
End: 2020-06-10
Payer: COMMERCIAL

## 2020-06-10 VITALS
HEART RATE: 104 BPM | WEIGHT: 121.38 LBS | SYSTOLIC BLOOD PRESSURE: 124 MMHG | RESPIRATION RATE: 16 BRPM | DIASTOLIC BLOOD PRESSURE: 87 MMHG | OXYGEN SATURATION: 99 % | BODY MASS INDEX: 27 KG/M2

## 2020-06-10 DIAGNOSIS — K63.89 COLONIC MASS: Primary | ICD-10-CM

## 2020-06-10 DIAGNOSIS — C18.2 MALIGNANT NEOPLASM OF ASCENDING COLON (HCC): Primary | ICD-10-CM

## 2020-06-10 PROCEDURE — 99024 POSTOP FOLLOW-UP VISIT: CPT | Performed by: SURGERY

## 2020-06-10 PROCEDURE — 99205 OFFICE O/P NEW HI 60 MIN: CPT | Performed by: INTERNAL MEDICINE

## 2020-06-10 NOTE — CONSULTS
[facility] History and Physical    Patient Name: Rachel Saul   YOB: 1970   Medical Record Number: [id number]   CSN: [id number]   Attending Physician:  Nga Anaya MD       Date of Visit: 6/10/2020     Chief Complaint:  Colon cancer     Hi (four) hours as needed. , Disp: 20 tablet, Rfl: 0  •  ondansetron 4 MG Oral Tablet Dispersible, Take 1 tablet (4 mg total) by mouth every 8 (eight) hours as needed for Nausea.  (Patient not taking: Reported on 6/10/2020 ), Disp: 10 tablet, Rfl: 0  •  acetami RBC 3.94 06/08/2020    HGB 8.9 (L) 06/08/2020    HCT 29.2 (L) 06/08/2020    MCV 74.1 (L) 06/08/2020    MCH 22.6 (L) 06/08/2020    MCHC 30.5 (L) 06/08/2020    RDW 16.5 (H) 06/08/2020    .0 (H) 06/08/2020     Lab Results   Component Value Date    NA 1 lymphovascular invasion. However no evidence of adherence to other organs (T4b) or any lymph node involvement, she had 28 lymph nodes removed and all were negative for any involvement.   In the absence of MSI instability she would have been a candidate for

## 2020-06-10 NOTE — PROGRESS NOTES
Here for post op 6-5-2020 Robotic assisted right hemicolectomy, intracorporeal isoperistaltic ileocolonic anastomosis  2. En bloc resection of right-sided anterior abdominal wall  3.   ICG interrogation of bowel vascular supply  C/O pain 8/10; last pain me

## 2020-06-11 NOTE — PROGRESS NOTES
[facility]    Patient Name:  Manan Lim   YOB: 1970   Gender:  Female   Appt Date:  6/10/2020   Provider:  Shanthi Schulte MD   Insurance:       PATIENT PROVIDERS  Referring Provider: No ref.  provi pain.     Vital Signs:  /87 (BP Location: Left arm, Patient Position: Sitting, Cuff Size: adult)   Pulse 104   Resp 16   Wt 55.1 kg (121 lb 6.4 oz)   SpO2 99%   BMI 27.22 kg/m²      Medications Reviewed:    Current Outpatient Medications:   •  oxyCOD education: Not on file      Highest education level: Not on file    Tobacco Use      Smoking status: Never Smoker      Smokeless tobacco: Never Used     Reviewed:  No family history on file.      Review of Systems:  Review of Systems   Constitutional: Jessica Martínez Authorizing Provider: Ana Mandel MD         Collected:           06/05/2020 11:13 AM           Ordering Location:   [facility]          Received:            06/05/2020 12:33 PM                                  Operating Room               repair proteins MLH1, PMS2, MSH2 and MSH6 is present. 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   This case has been forwarded for PCR testing (Block A8) to evaluate for the presence and level of microsatellite instability.      The immunohistoc submitted in formalin labeled “Payton, right colon and terminal ileum” and consists of a right gracie-colectomy including terminal ileum (3.5 cm in length x 2.0 cm in diameter), cecum (8.0 x 7.5 x 6.7 cm), appendix (6.0 cm in length x 0.6 cm in diameter) and perforation of the cecum; A14 - appendix base (inked black), bisected tip and cross sections of body; A15-A18 - twenty-four lymph node candidates (four per cassette); A19 - four lymph node candidates.   (jq)     Rowena Rodriguez M.D./[facility]           Assessment /

## 2020-06-14 PROBLEM — C18.2 MALIGNANT NEOPLASM OF ASCENDING COLON (HCC): Status: ACTIVE | Noted: 2020-06-14

## 2020-06-16 ENCOUNTER — TELEPHONE (OUTPATIENT)
Dept: HEMATOLOGY/ONCOLOGY | Facility: HOSPITAL | Age: 50
End: 2020-06-16

## 2020-06-17 ENCOUNTER — OFFICE VISIT (OUTPATIENT)
Dept: SURGERY | Facility: CLINIC | Age: 50
End: 2020-06-17
Payer: COMMERCIAL

## 2020-06-17 ENCOUNTER — NURSE ONLY (OUTPATIENT)
Dept: HEMATOLOGY/ONCOLOGY | Facility: HOSPITAL | Age: 50
End: 2020-06-17
Attending: INTERNAL MEDICINE
Payer: COMMERCIAL

## 2020-06-17 VITALS
BODY MASS INDEX: 27 KG/M2 | DIASTOLIC BLOOD PRESSURE: 88 MMHG | SYSTOLIC BLOOD PRESSURE: 197 MMHG | WEIGHT: 119.19 LBS | HEART RATE: 88 BPM | RESPIRATION RATE: 16 BRPM | OXYGEN SATURATION: 99 %

## 2020-06-17 DIAGNOSIS — K63.89 COLONIC MASS: ICD-10-CM

## 2020-06-17 PROCEDURE — 80053 COMPREHEN METABOLIC PANEL: CPT | Performed by: SURGERY

## 2020-06-17 PROCEDURE — 85025 COMPLETE CBC W/AUTO DIFF WBC: CPT | Performed by: SURGERY

## 2020-06-17 PROCEDURE — 99024 POSTOP FOLLOW-UP VISIT: CPT | Performed by: SURGERY

## 2020-06-17 PROCEDURE — 36415 COLL VENOUS BLD VENIPUNCTURE: CPT

## 2020-06-17 RX ORDER — OXYCODONE HYDROCHLORIDE 5 MG/1
5 TABLET ORAL EVERY 4 HOURS PRN
Qty: 20 TABLET | Refills: 0 | Status: ON HOLD | OUTPATIENT
Start: 2020-06-17 | End: 2020-07-22

## 2020-06-17 NOTE — PROGRESS NOTES
[facility]    Patient Name:  Eitan Qureshi   YOB: 1970   Gender:  Female   Appt Date:  6/10/2020   Provider:  Alberto Apple MD   Insurance:       PATIENT PROVIDERS  Referring Provider:        Mookie May 54.1 kg (119 lb 3.2 oz)   SpO2 99%   BMI 26.72 kg/m²      Medications Reviewed:    Current Outpatient Medications:   •  oxyCODONE HCl 5 MG Oral Tab, Take 1 tablet (5 mg total) by mouth every 4 (four) hours as needed. , Disp: 20 tablet, Rfl: 0  •  ondansetro tobacco: Never Used     Reviewed:  No family history on file. Review of Systems:  Review of Systems   Constitutional: Negative for activity change, appetite change, chills, fatigue, fever and unexpected weight change.    HENT: Negative for congestion an Hospital          Received:            06/05/2020 12:33 PM                                  Operating Room                                                                Pathologist:           Rowdy Medina MD                                               for PCR testing (Block A8) to evaluate for the presence and level of microsatellite instability.      The immunohistochemical stains interpreted in this case demonstrated appropriate reactivity of the positive controls.  These stains were performed on forma length x 2.0 cm in diameter), cecum (8.0 x 7.5 x 6.7 cm), appendix (6.0 cm in length x 0.6 cm in diameter) and ascending colon (10.0 cm in length x 2.5 cm in average diameter).  A black stitch marks the proximal margin, a blue stitch marks the abdominal wal per cassette); A19 - four lymph node candidates. (jq)     Aidan Ann M.D./[facility]           Assessment / Plan:  50-year-old female who was recent diagnosed with T4aN0 M0 invasive poorly differentiated adenocarcinoma.  She was taken to the operating room on 6

## 2020-06-22 ENCOUNTER — GENETICS ENCOUNTER (OUTPATIENT)
Dept: GENETICS | Facility: HOSPITAL | Age: 50
End: 2020-06-22
Attending: INTERNAL MEDICINE
Payer: COMMERCIAL

## 2020-06-22 ENCOUNTER — TELEPHONE (OUTPATIENT)
Dept: HEMATOLOGY/ONCOLOGY | Facility: HOSPITAL | Age: 50
End: 2020-06-22

## 2020-06-22 DIAGNOSIS — C18.2 MALIGNANT NEOPLASM OF ASCENDING COLON (HCC): Primary | ICD-10-CM

## 2020-06-22 DIAGNOSIS — Z80.0 FAMILY HISTORY OF COLON CANCER: ICD-10-CM

## 2020-06-22 PROCEDURE — 96040 HC GENETIC COUNSELING EA 30 MIN: CPT | Performed by: GENETIC COUNSELOR, MS

## 2020-06-22 NOTE — PROGRESS NOTES
Medical History: Mrs. Doyle Connolly is a 62-year-old woman referred for a personal history of colon cancer and a family history of malignancies.   At the age of 47, she was diagnosed with a large, advanced colon cancer which has had IHC testing and MSI testi passed away from colon cancer shortly after being diagnosed at age 66. Two of this aunt’s daughters also passed away from cancer. One cousin had an unknown primary and passed away at age 48 and the other had stomach cancer at passed away at age 61.   She syndrome, but we did review that these are screening tests, and that somatic mutations can also be the cause of these results. We discussed dominant inheritance, the pattern in which most hereditary cancer conditions are inherited.   If an individual has s history is suggestive of more than one syndrome, and they improve detection rate for identifying the underlying cause of a hereditary cancer.   Limitations of panels include an unknown percentage of variants of unknown significance, as well as an uncertaint the future when she is interested in having this preformed and it will be sent to [facility]. She understands that the tumor sample will be requested from pathology by [facility] and that results take 3-4 weeks from receipt of the tumor sample.     Thank you for ref

## 2020-06-29 ENCOUNTER — OFFICE VISIT (OUTPATIENT)
Dept: HEMATOLOGY/ONCOLOGY | Facility: HOSPITAL | Age: 50
End: 2020-06-29
Attending: INTERNAL MEDICINE
Payer: COMMERCIAL

## 2020-06-29 VITALS
BODY MASS INDEX: 26.99 KG/M2 | HEART RATE: 85 BPM | DIASTOLIC BLOOD PRESSURE: 81 MMHG | RESPIRATION RATE: 16 BRPM | HEIGHT: 56 IN | WEIGHT: 120 LBS | OXYGEN SATURATION: 99 % | SYSTOLIC BLOOD PRESSURE: 128 MMHG

## 2020-06-29 DIAGNOSIS — D50.0 IRON DEFICIENCY ANEMIA DUE TO CHRONIC BLOOD LOSS: ICD-10-CM

## 2020-06-29 DIAGNOSIS — C18.2 MALIGNANT NEOPLASM OF ASCENDING COLON (HCC): ICD-10-CM

## 2020-06-29 DIAGNOSIS — D75.839 THROMBOCYTOSIS: ICD-10-CM

## 2020-06-29 DIAGNOSIS — Z80.0 FAMILY HISTORY OF COLON CANCER: ICD-10-CM

## 2020-06-29 DIAGNOSIS — C18.2 MALIGNANT NEOPLASM OF ASCENDING COLON (HCC): Primary | ICD-10-CM

## 2020-06-29 PROBLEM — D50.9 IRON DEFICIENCY ANEMIA: Status: ACTIVE | Noted: 2020-06-29

## 2020-06-29 PROBLEM — K63.89 COLONIC MASS: Status: RESOLVED | Noted: 2020-05-31 | Resolved: 2020-06-29

## 2020-06-29 PROBLEM — R10.9 ABDOMINAL PAIN OF UNKNOWN ETIOLOGY: Status: RESOLVED | Noted: 2020-05-31 | Resolved: 2020-06-29

## 2020-06-29 PROCEDURE — 85025 COMPLETE CBC W/AUTO DIFF WBC: CPT

## 2020-06-29 PROCEDURE — 82607 VITAMIN B-12: CPT

## 2020-06-29 PROCEDURE — 36415 COLL VENOUS BLD VENIPUNCTURE: CPT

## 2020-06-29 PROCEDURE — 99214 OFFICE O/P EST MOD 30 MIN: CPT | Performed by: INTERNAL MEDICINE

## 2020-06-29 PROCEDURE — 83540 ASSAY OF IRON: CPT

## 2020-06-29 PROCEDURE — 84466 ASSAY OF TRANSFERRIN: CPT

## 2020-06-29 PROCEDURE — 82728 ASSAY OF FERRITIN: CPT

## 2020-06-29 PROCEDURE — 80053 COMPREHEN METABOLIC PANEL: CPT

## 2020-06-29 NOTE — PROGRESS NOTES
Cancer Center Progress Note    Patient Name: Lamxi Silver   YOB: 1970   Medical Record Number: [id number]   Attending Physician: Jacklyn Gonzales M.D.      Chief Complaint:  Colon cancer    Oncology History:  Laxmi Silver is a 47year old female b stomach ca (dtr of aunt w/colon ca)       Social History:  Unchanged from consult note    Current Medications:    Current Outpatient Medications:   •  oxyCODONE HCl 5 MG Oral Tab, Take 1 tablet (5 mg total) by mouth every 4 (four) hours as needed. , Disp: 2 rash      Laboratory assessed and reviewed:  Lab Results   Component Value Date     (H) 06/17/2020    BUN 9 06/17/2020    BUNCREA 13.6 06/17/2020    CREATSERUM 0.66 06/17/2020    ANIONGAP 4 06/17/2020    GFRNAA 103 06/17/2020    GFRAA 119 06/17/2020 will start IV    Thrombosytosis- likely due to above.      MMR loss  Family history of GI malignancies.    Genetics pending    MDM:anamaria Pollock MD

## 2020-07-01 ENCOUNTER — TELEPHONE (OUTPATIENT)
Dept: HEMATOLOGY/ONCOLOGY | Facility: HOSPITAL | Age: 50
End: 2020-07-01

## 2020-07-01 NOTE — TELEPHONE ENCOUNTER
Spoke with Virgilio Sharp.  Scheduled a chemo education with her on 7/6 with an iron infusion to follow. She states she would like to start as soon as possible. Will request start date after the port on 7/7.

## 2020-07-04 ENCOUNTER — LAB ENCOUNTER (OUTPATIENT)
Dept: LAB | Facility: HOSPITAL | Age: 50
End: 2020-07-04
Attending: RADIOLOGY
Payer: COMMERCIAL

## 2020-07-04 DIAGNOSIS — Z01.818 PRE-OP TESTING: ICD-10-CM

## 2020-07-05 LAB — SARS-COV-2 RNA RESP QL NAA+PROBE: NOT DETECTED

## 2020-07-06 ENCOUNTER — OFFICE VISIT (OUTPATIENT)
Dept: HEMATOLOGY/ONCOLOGY | Facility: HOSPITAL | Age: 50
End: 2020-07-06
Attending: INTERNAL MEDICINE
Payer: COMMERCIAL

## 2020-07-06 ENCOUNTER — GENETICS ENCOUNTER (OUTPATIENT)
Dept: GENETICS | Facility: HOSPITAL | Age: 50
End: 2020-07-06

## 2020-07-06 VITALS — SYSTOLIC BLOOD PRESSURE: 146 MMHG | DIASTOLIC BLOOD PRESSURE: 90 MMHG | HEART RATE: 89 BPM

## 2020-07-06 DIAGNOSIS — C18.2 MALIGNANT NEOPLASM OF ASCENDING COLON (HCC): Primary | ICD-10-CM

## 2020-07-06 DIAGNOSIS — D50.0 IRON DEFICIENCY ANEMIA DUE TO CHRONIC BLOOD LOSS: ICD-10-CM

## 2020-07-06 LAB
ALBUMIN SERPL-MCNC: 3.2 G/DL (ref 3.4–5)
ALBUMIN/GLOB SERPL: 0.7 {RATIO} (ref 1–2)
ALP LIVER SERPL-CCNC: 167 U/L (ref 39–100)
ALT SERPL-CCNC: 44 U/L (ref 13–56)
ANION GAP SERPL CALC-SCNC: 8 MMOL/L (ref 0–18)
AST SERPL-CCNC: 49 U/L (ref 15–37)
BASOPHILS # BLD AUTO: 0.05 X10(3) UL (ref 0–0.2)
BASOPHILS NFR BLD AUTO: 0.9 %
BILIRUB SERPL-MCNC: 0.4 MG/DL (ref 0.1–2)
BUN BLD-MCNC: 9 MG/DL (ref 7–18)
BUN/CREAT SERPL: 11.5 (ref 10–20)
CALCIUM BLD-MCNC: 8.7 MG/DL (ref 8.5–10.1)
CHLORIDE SERPL-SCNC: 107 MMOL/L (ref 98–112)
CO2 SERPL-SCNC: 24 MMOL/L (ref 21–32)
CREAT BLD-MCNC: 0.78 MG/DL (ref 0.55–1.02)
DEPRECATED RDW RBC AUTO: 52.9 FL (ref 35.1–46.3)
EOSINOPHIL # BLD AUTO: 0.34 X10(3) UL (ref 0–0.7)
EOSINOPHIL NFR BLD AUTO: 6.4 %
ERYTHROCYTE [DISTWIDTH] IN BLOOD BY AUTOMATED COUNT: 18.7 % (ref 11–15)
GLOBULIN PLAS-MCNC: 4.4 G/DL (ref 2.8–4.4)
GLUCOSE BLD-MCNC: 217 MG/DL (ref 70–99)
HCT VFR BLD AUTO: 33.6 % (ref 35–48)
HGB BLD-MCNC: 10.6 G/DL (ref 12–16)
IMM GRANULOCYTES # BLD AUTO: 0.01 X10(3) UL (ref 0–1)
IMM GRANULOCYTES NFR BLD: 0.2 %
LYMPHOCYTES # BLD AUTO: 2.11 X10(3) UL (ref 1–4)
LYMPHOCYTES NFR BLD AUTO: 39.8 %
M PROTEIN MFR SERPL ELPH: 7.6 G/DL (ref 6.4–8.2)
MCH RBC QN AUTO: 24.7 PG (ref 26–34)
MCHC RBC AUTO-ENTMCNC: 31.5 G/DL (ref 31–37)
MCV RBC AUTO: 78.3 FL (ref 80–100)
MONOCYTES # BLD AUTO: 0.34 X10(3) UL (ref 0.1–1)
MONOCYTES NFR BLD AUTO: 6.4 %
NEUTROPHILS # BLD AUTO: 2.45 X10 (3) UL (ref 1.5–7.7)
NEUTROPHILS # BLD AUTO: 2.45 X10(3) UL (ref 1.5–7.7)
NEUTROPHILS NFR BLD AUTO: 46.3 %
OSMOLALITY SERPL CALC.SUM OF ELEC: 293 MOSM/KG (ref 275–295)
PLATELET # BLD AUTO: 467 10(3)UL (ref 150–450)
POTASSIUM SERPL-SCNC: 3.8 MMOL/L (ref 3.5–5.1)
RBC # BLD AUTO: 4.29 X10(6)UL (ref 3.8–5.3)
SODIUM SERPL-SCNC: 139 MMOL/L (ref 136–145)
WBC # BLD AUTO: 5.3 X10(3) UL (ref 4–11)

## 2020-07-06 PROCEDURE — 99215 OFFICE O/P EST HI 40 MIN: CPT | Performed by: PHYSICIAN ASSISTANT

## 2020-07-06 PROCEDURE — 85025 COMPLETE CBC W/AUTO DIFF WBC: CPT

## 2020-07-06 PROCEDURE — 80053 COMPREHEN METABOLIC PANEL: CPT

## 2020-07-06 PROCEDURE — 96374 THER/PROPH/DIAG INJ IV PUSH: CPT

## 2020-07-06 RX ORDER — ONDANSETRON HYDROCHLORIDE 8 MG/1
8 TABLET, FILM COATED ORAL EVERY 8 HOURS PRN
Qty: 30 TABLET | Refills: 1 | Status: SHIPPED | OUTPATIENT
Start: 2020-07-06 | End: 2020-07-16

## 2020-07-06 RX ORDER — PROCHLORPERAZINE MALEATE 10 MG
10 TABLET ORAL EVERY 6 HOURS PRN
Qty: 60 TABLET | Refills: 1 | Status: ON HOLD | OUTPATIENT
Start: 2020-07-06 | End: 2020-07-22

## 2020-07-06 RX ORDER — HEPARIN SODIUM (PORCINE) LOCK FLUSH IV SOLN 100 UNIT/ML 100 UNIT/ML
5 SOLUTION INTRAVENOUS ONCE
Status: CANCELLED | OUTPATIENT
Start: 2020-07-06

## 2020-07-06 RX ORDER — 0.9 % SODIUM CHLORIDE 0.9 %
10 VIAL (ML) INJECTION ONCE
Status: CANCELLED | OUTPATIENT
Start: 2020-07-06

## 2020-07-06 NOTE — PATIENT INSTRUCTIONS
Medication Education Record: IV Therapy    Learner:  Patient and Spouse    Barriers / Limitations:  None    Psychosocial Assessment:  patient psychosocial response appropriate    Diagnosis:   Colon cancer    IV Cancer Treatment Name(s): Oxaliplatin, Fluoro minimize this from occurring.     Recommended Anti-nausea medications (as directed by your provider):  Prochloperazine (Compazine) 10 mg every 6 hours and Ondansetron (Zofran) 8 mg every 8 hours  Take as needed    Other drug specific:   Premedication (dexam least 12 hours; during the night take 2 tabs (4mg) every 4 hours as needed. Maximum of 8 tablets in 24 hours.    o Constipation:  Over-the-counter recommendations include Senokot, Ducolax, Milk of Magnesia or MiraLAX (generics are ok)  o If you have persis to Call:  [place] [phone] / Triage Nurse    Teaching Materials Provided:   Chemo Care chemotherapy information sheets  and Safety and Handling Sheet     Please refer to the following link if you are interested in additional information about c urine or stool. Safety for my family and friends:  1. Due to safety concerns and the nature of this treatment environment, children are not permitted in the infusion center. Please make appropriate arrangements.    2. Hugging and kissing is safe for y

## 2020-07-06 NOTE — PROGRESS NOTES
Met briefly with Ant Helms and her daughter while she was undergoing her iron infusion/chemo teach to let her know insurance had approved the genetic testing with an $100 OOP estimate. We do have an authorization at this time.  She wants to move forward with

## 2020-07-06 NOTE — PROGRESS NOTES
Medication Education Record: IV Therapy    Learner:  Patient and Spouse    Barriers / Limitations:  None    Psychosocial Assessment:  patient psychosocial response appropriate    Diagnosis:   Colon cancer    IV Cancer Treatment Name(s): Oxaliplatin, Fluoro minimize this from occurring.     Recommended Anti-nausea medications (as directed by your provider):  Prochloperazine (Compazine) 10 mg every 6 hours and Ondansetron (Zofran) 8 mg every 8 hours  Take as needed    Other drug specific:   Premedication (dexam least 12 hours; during the night take 2 tabs (4mg) every 4 hours as needed. Maximum of 8 tablets in 24 hours.    o Constipation:  Over-the-counter recommendations include Senokot, Ducolax, Milk of Magnesia or MiraLAX (generics are ok)  o If you have persis to Call:  [place] [phone] / Triage Nurse    Teaching Materials Provided:   Chemo Care chemotherapy information sheets  and Safety and Handling Sheet     Please refer to the following link if you are interested in additional information about c using the toilet. 6. Wash any skin area that has come in contact with urine or stool. Safety for my family and friends:  1. Due to safety concerns and the nature of this treatment environment, children are not permitted in the infusion center.   Haley

## 2020-07-06 NOTE — PROGRESS NOTES
Andria to infusion for pre chemotherapy labs and venofer. PIV established with brisk blood return noted. Labs drawn and sent. Plan of care explained for venofer, potential adverse reaction to and s/s of. Has had iron infusions in the past and tolerate.   2

## 2020-07-07 ENCOUNTER — HOSPITAL ENCOUNTER (OUTPATIENT)
Dept: INTERVENTIONAL RADIOLOGY/VASCULAR | Facility: HOSPITAL | Age: 50
Discharge: HOME | End: 2020-07-07
Attending: INTERNAL MEDICINE | Admitting: RADIOLOGY
Payer: COMMERCIAL

## 2020-07-07 VITALS
OXYGEN SATURATION: 100 % | RESPIRATION RATE: 25 BRPM | BODY MASS INDEX: 27 KG/M2 | DIASTOLIC BLOOD PRESSURE: 86 MMHG | HEART RATE: 87 BPM | SYSTOLIC BLOOD PRESSURE: 136 MMHG | WEIGHT: 120 LBS

## 2020-07-07 DIAGNOSIS — C18.2 MALIGNANT NEOPLASM OF ASCENDING COLON (HCC): ICD-10-CM

## 2020-07-07 DIAGNOSIS — Z01.818 PRE-OP TESTING: Primary | ICD-10-CM

## 2020-07-07 LAB — GLUCOSE BLDC GLUCOMTR-MCNC: 135 MG/DL (ref 70–99)

## 2020-07-07 PROCEDURE — 36561 INSERT TUNNELED CV CATH: CPT

## 2020-07-07 PROCEDURE — 36415 COLL VENOUS BLD VENIPUNCTURE: CPT

## 2020-07-07 PROCEDURE — 0JH60XZ INSERTION OF TUNNELED VASCULAR ACCESS DEVICE INTO CHEST SUBCUTANEOUS TISSUE AND FASCIA, OPEN APPROACH: ICD-10-PCS | Performed by: RADIOLOGY

## 2020-07-07 PROCEDURE — 82962 GLUCOSE BLOOD TEST: CPT

## 2020-07-07 PROCEDURE — 77001 FLUOROGUIDE FOR VEIN DEVICE: CPT

## 2020-07-07 PROCEDURE — 02HV33Z INSERTION OF INFUSION DEVICE INTO SUPERIOR VENA CAVA, PERCUTANEOUS APPROACH: ICD-10-PCS | Performed by: RADIOLOGY

## 2020-07-07 PROCEDURE — 99152 MOD SED SAME PHYS/QHP 5/>YRS: CPT

## 2020-07-07 RX ORDER — ONDANSETRON 2 MG/ML
INJECTION INTRAMUSCULAR; INTRAVENOUS
Status: DISCONTINUED
Start: 2020-07-07 | End: 2020-07-07

## 2020-07-07 RX ORDER — BACITRACIN 50000 [USP'U]/1
INJECTION, POWDER, LYOPHILIZED, FOR SOLUTION INTRAMUSCULAR
Status: COMPLETED
Start: 2020-07-07 | End: 2020-07-07

## 2020-07-07 RX ORDER — ACETAMINOPHEN 500 MG
1000 TABLET ORAL ONCE
Status: DISCONTINUED | OUTPATIENT
Start: 2020-07-07 | End: 2020-07-07

## 2020-07-07 RX ORDER — MIDAZOLAM HYDROCHLORIDE 1 MG/ML
INJECTION INTRAMUSCULAR; INTRAVENOUS
Status: COMPLETED
Start: 2020-07-07 | End: 2020-07-07

## 2020-07-07 RX ORDER — HEPARIN SODIUM (PORCINE) LOCK FLUSH IV SOLN 100 UNIT/ML 100 UNIT/ML
SOLUTION INTRAVENOUS
Status: COMPLETED
Start: 2020-07-07 | End: 2020-07-07

## 2020-07-07 RX ORDER — ACETAMINOPHEN 500 MG
TABLET ORAL
Status: DISCONTINUED
Start: 2020-07-07 | End: 2020-07-07

## 2020-07-07 RX ORDER — CEFAZOLIN SODIUM/WATER 2 G/20 ML
SYRINGE (ML) INTRAVENOUS
Status: COMPLETED
Start: 2020-07-07 | End: 2020-07-07

## 2020-07-07 RX ORDER — ONDANSETRON 2 MG/ML
4 INJECTION INTRAMUSCULAR; INTRAVENOUS ONCE
Status: DISCONTINUED | OUTPATIENT
Start: 2020-07-07 | End: 2020-07-07

## 2020-07-07 RX ORDER — SODIUM CHLORIDE 9 MG/ML
INJECTION, SOLUTION INTRAVENOUS CONTINUOUS
Status: DISCONTINUED | OUTPATIENT
Start: 2020-07-07 | End: 2020-07-07

## 2020-07-07 RX ORDER — LIDOCAINE HYDROCHLORIDE 20 MG/ML
INJECTION, SOLUTION EPIDURAL; INFILTRATION; INTRACAUDAL; PERINEURAL
Status: COMPLETED
Start: 2020-07-07 | End: 2020-07-07

## 2020-07-07 NOTE — INTERVAL H&P NOTE
The above referenced H&P was reviewed by Darian Fernández MD on 7/7/2020, the patient was examined and no significant changes have occurred in the patient's condition since the H&P was performed.   Risks, benefits, alternative treatments and consequences of no

## 2020-07-09 ENCOUNTER — OFFICE VISIT (OUTPATIENT)
Dept: HEMATOLOGY/ONCOLOGY | Facility: HOSPITAL | Age: 50
End: 2020-07-09
Attending: INTERNAL MEDICINE
Payer: COMMERCIAL

## 2020-07-09 VITALS
DIASTOLIC BLOOD PRESSURE: 79 MMHG | OXYGEN SATURATION: 100 % | RESPIRATION RATE: 16 BRPM | HEART RATE: 86 BPM | SYSTOLIC BLOOD PRESSURE: 134 MMHG | TEMPERATURE: 98 F

## 2020-07-09 DIAGNOSIS — D50.0 IRON DEFICIENCY ANEMIA DUE TO CHRONIC BLOOD LOSS: Primary | ICD-10-CM

## 2020-07-09 PROCEDURE — 96374 THER/PROPH/DIAG INJ IV PUSH: CPT

## 2020-07-09 RX ORDER — 0.9 % SODIUM CHLORIDE 0.9 %
VIAL (ML) INJECTION
Status: DISCONTINUED
Start: 2020-07-09 | End: 2020-07-09

## 2020-07-09 RX ORDER — 0.9 % SODIUM CHLORIDE 0.9 %
10 VIAL (ML) INJECTION ONCE
Status: CANCELLED | OUTPATIENT
Start: 2020-07-09

## 2020-07-09 RX ORDER — HEPARIN SODIUM (PORCINE) LOCK FLUSH IV SOLN 100 UNIT/ML 100 UNIT/ML
5 SOLUTION INTRAVENOUS ONCE
Status: CANCELLED | OUTPATIENT
Start: 2020-07-09

## 2020-07-09 RX ORDER — HEPARIN SODIUM (PORCINE) LOCK FLUSH IV SOLN 100 UNIT/ML 100 UNIT/ML
SOLUTION INTRAVENOUS
Status: COMPLETED
Start: 2020-07-09 | End: 2020-07-09

## 2020-07-09 RX ORDER — HEPARIN SODIUM (PORCINE) LOCK FLUSH IV SOLN 100 UNIT/ML 100 UNIT/ML
5 SOLUTION INTRAVENOUS ONCE
Status: COMPLETED | OUTPATIENT
Start: 2020-07-09 | End: 2020-07-09

## 2020-07-09 RX ADMIN — HEPARIN SODIUM (PORCINE) LOCK FLUSH IV SOLN 100 UNIT/ML 500 UNITS: 100 SOLUTION INTRAVENOUS at 16:51:00

## 2020-07-09 NOTE — PROGRESS NOTES
Andria to infusion for Venofer #2  Right chest Port accessed today for Venofer, brisk blood return noted. Port de-accessed and flushed. Site covered with gauze and paper tape.     200mg venofer iv push slow over 5 minutes with positive blood return verifi

## 2020-07-13 ENCOUNTER — NURSE ONLY (OUTPATIENT)
Dept: HEMATOLOGY/ONCOLOGY | Facility: HOSPITAL | Age: 50
End: 2020-07-13
Attending: INTERNAL MEDICINE
Payer: COMMERCIAL

## 2020-07-13 VITALS
OXYGEN SATURATION: 99 % | DIASTOLIC BLOOD PRESSURE: 82 MMHG | WEIGHT: 126 LBS | RESPIRATION RATE: 16 BRPM | SYSTOLIC BLOOD PRESSURE: 139 MMHG | HEART RATE: 84 BPM | BODY MASS INDEX: 28 KG/M2 | TEMPERATURE: 98 F

## 2020-07-13 VITALS
BODY MASS INDEX: 28.34 KG/M2 | SYSTOLIC BLOOD PRESSURE: 139 MMHG | DIASTOLIC BLOOD PRESSURE: 82 MMHG | WEIGHT: 126 LBS | TEMPERATURE: 98 F | OXYGEN SATURATION: 99 % | HEART RATE: 84 BPM | RESPIRATION RATE: 16 BRPM | HEIGHT: 56 IN

## 2020-07-13 DIAGNOSIS — C18.2 MALIGNANT NEOPLASM OF ASCENDING COLON (HCC): Primary | ICD-10-CM

## 2020-07-13 PROCEDURE — 96368 THER/DIAG CONCURRENT INF: CPT

## 2020-07-13 PROCEDURE — 96375 TX/PRO/DX INJ NEW DRUG ADDON: CPT

## 2020-07-13 PROCEDURE — 96411 CHEMO IV PUSH ADDL DRUG: CPT

## 2020-07-13 PROCEDURE — 96413 CHEMO IV INFUSION 1 HR: CPT

## 2020-07-13 PROCEDURE — 96415 CHEMO IV INFUSION ADDL HR: CPT

## 2020-07-13 RX ORDER — FLUOROURACIL 50 MG/ML
2400 INJECTION, SOLUTION INTRAVENOUS CONTINUOUS
Status: DISCONTINUED | OUTPATIENT
Start: 2020-07-13 | End: 2020-07-13

## 2020-07-13 RX ORDER — 0.9 % SODIUM CHLORIDE 0.9 %
VIAL (ML) INJECTION
Status: DISCONTINUED
Start: 2020-07-13 | End: 2020-07-13

## 2020-07-13 RX ORDER — FLUOROURACIL 50 MG/ML
400 INJECTION, SOLUTION INTRAVENOUS ONCE
Status: COMPLETED | OUTPATIENT
Start: 2020-07-13 | End: 2020-07-13

## 2020-07-13 RX ADMIN — FLUOROURACIL 3450 MG: 50 INJECTION, SOLUTION INTRAVENOUS at 12:06:00

## 2020-07-13 RX ADMIN — FLUOROURACIL 550 MG: 50 INJECTION, SOLUTION INTRAVENOUS at 12:00:00

## 2020-07-13 NOTE — PATIENT INSTRUCTIONS
Recommended Anti-nausea medications (as directed by your provider):  Prochloperazine (Compazine) 10 mg every 6 hours and Ondansetron (Zofran) 8 mg every 8 hours  Take as needed    Helpful hints during cancer treatment:    Diet:  o Avoid greasy or spicy cyndi please contact the triage nurse for further instructions. Skin Care  o Avoid direct sunlight.  o Wear a broad-spectrum sunscreen with an SPF of 30 or higher on any skin exposed to the sun.   Re-apply every 2 hours if in the sun and after bathing or sweatin Intravenous (IV) Medication:  1. Make sure the connections of your IV tubing are tight and not leaking. You should do this twice a day. 2. If the IV connection is leaking:  a. Put on disposable gloves  b.  Stop the pump by clamping the tubing and turnin traces of the oral chemotherapy may be present in vaginal fluid or semen for 48 hours after taking. A condom should be used during this time.   Effective birth control should be used throughout treatment to prevent pregnancy while on these medications and

## 2020-07-13 NOTE — PROGRESS NOTES
Pt here for C1D1 FOLFOX.   Arrives Ambulating independently, accompanied by Self           Patient reports possible pregnancy since last therapy cycle: No    Modifications in dose or schedule: No     Frequency of blood return and site check throughout admin about the pump. Pt given hand out on symptoms to call MD with. Reinforced the need to call with any temp of 100.4 or above. Instructed to go to ER if she has a fever and chills.

## 2020-07-14 ENCOUNTER — TELEPHONE (OUTPATIENT)
Dept: HEMATOLOGY/ONCOLOGY | Facility: HOSPITAL | Age: 50
End: 2020-07-14

## 2020-07-14 NOTE — TELEPHONE ENCOUNTER
Toxicities: C1 D1 FOLFOX on 7/13/2020    Jose Jacobo said she is feeling good. I encouraged her to make sure she eats 4-5 small meals throughout the day and drink 64-80oz of caffeine free fluids daily.  I also asked her to please call the office is she has any q

## 2020-07-15 ENCOUNTER — NURSE ONLY (OUTPATIENT)
Dept: HEMATOLOGY/ONCOLOGY | Facility: HOSPITAL | Age: 50
End: 2020-07-15
Attending: INTERNAL MEDICINE
Payer: COMMERCIAL

## 2020-07-15 VITALS
HEART RATE: 87 BPM | DIASTOLIC BLOOD PRESSURE: 100 MMHG | RESPIRATION RATE: 18 BRPM | TEMPERATURE: 98 F | SYSTOLIC BLOOD PRESSURE: 165 MMHG

## 2020-07-15 VITALS
RESPIRATION RATE: 16 BRPM | TEMPERATURE: 98 F | SYSTOLIC BLOOD PRESSURE: 150 MMHG | DIASTOLIC BLOOD PRESSURE: 78 MMHG | HEART RATE: 91 BPM

## 2020-07-15 DIAGNOSIS — R11.12 PROJECTILE VOMITING WITH NAUSEA: Primary | ICD-10-CM

## 2020-07-15 DIAGNOSIS — D50.0 IRON DEFICIENCY ANEMIA DUE TO CHRONIC BLOOD LOSS: ICD-10-CM

## 2020-07-15 DIAGNOSIS — R11.2 NAUSEA & VOMITING: Primary | ICD-10-CM

## 2020-07-15 DIAGNOSIS — E86.1 HYPOVOLEMIA: ICD-10-CM

## 2020-07-15 PROCEDURE — 96374 THER/PROPH/DIAG INJ IV PUSH: CPT

## 2020-07-15 PROCEDURE — 96361 HYDRATE IV INFUSION ADD-ON: CPT

## 2020-07-15 RX ORDER — 0.9 % SODIUM CHLORIDE 0.9 %
10 VIAL (ML) INJECTION ONCE
Status: CANCELLED | OUTPATIENT
Start: 2020-07-15

## 2020-07-15 RX ORDER — HEPARIN SODIUM (PORCINE) LOCK FLUSH IV SOLN 100 UNIT/ML 100 UNIT/ML
5 SOLUTION INTRAVENOUS ONCE
Status: DISCONTINUED | OUTPATIENT
Start: 2020-07-15 | End: 2020-07-15

## 2020-07-15 RX ORDER — ONDANSETRON 2 MG/ML
8 INJECTION INTRAMUSCULAR; INTRAVENOUS ONCE
Status: COMPLETED | OUTPATIENT
Start: 2020-07-15 | End: 2020-07-15

## 2020-07-15 RX ORDER — ONDANSETRON 2 MG/ML
8 INJECTION INTRAMUSCULAR; INTRAVENOUS ONCE
Status: CANCELLED
Start: 2020-07-15

## 2020-07-15 RX ORDER — HEPARIN SODIUM (PORCINE) LOCK FLUSH IV SOLN 100 UNIT/ML 100 UNIT/ML
5 SOLUTION INTRAVENOUS ONCE
Status: CANCELLED | OUTPATIENT
Start: 2020-07-15

## 2020-07-15 RX ORDER — ONDANSETRON 2 MG/ML
INJECTION INTRAMUSCULAR; INTRAVENOUS
Status: COMPLETED
Start: 2020-07-15 | End: 2020-07-15

## 2020-07-15 RX ORDER — HEPARIN SODIUM (PORCINE) LOCK FLUSH IV SOLN 100 UNIT/ML 100 UNIT/ML
5 SOLUTION INTRAVENOUS ONCE
Status: COMPLETED | OUTPATIENT
Start: 2020-07-15 | End: 2020-07-15

## 2020-07-15 RX ADMIN — ONDANSETRON 8 MG: 2 INJECTION INTRAMUSCULAR; INTRAVENOUS at 10:37:00

## 2020-07-15 RX ADMIN — HEPARIN SODIUM (PORCINE) LOCK FLUSH IV SOLN 100 UNIT/ML 500 UNITS: 100 SOLUTION INTRAVENOUS at 12:39:00

## 2020-07-15 NOTE — PROGRESS NOTES
Patient arrives for CADD pump d/c. Patient appears very weak. She reports she is extremely nauseated and just does not feel well. Reports she took Olam Jp yesterday and has not been able to eat or drink anything today. Patient BP elevated.  Spoke with Antolin Henderson,

## 2020-07-15 NOTE — PATIENT INSTRUCTIONS
For next 3-4 days alternate the zofran and the compazine every 4 hours. You can try the schedule below or alter times as needed.      6 AM  Zofran (Odansetron)    10 AM Compazine  (prochlorperazine)    2 PM  Zofran    6 PM  Compazine    10 PM Zofran    If y

## 2020-07-15 NOTE — PROGRESS NOTES
Pt to infusion area from lab. She has has n/v since yesterday. Pt had an emesis while in lab and Zofran was given. Pt states the nausea started yesterday afternoon and she took Zofran ,but it did not help. She than took compazine and it made her sleepy.

## 2020-07-16 ENCOUNTER — TELEPHONE (OUTPATIENT)
Dept: HEMATOLOGY/ONCOLOGY | Facility: HOSPITAL | Age: 50
End: 2020-07-16

## 2020-07-16 ENCOUNTER — OFFICE VISIT (OUTPATIENT)
Dept: HEMATOLOGY/ONCOLOGY | Facility: HOSPITAL | Age: 50
End: 2020-07-16
Attending: INTERNAL MEDICINE
Payer: COMMERCIAL

## 2020-07-16 VITALS
OXYGEN SATURATION: 100 % | SYSTOLIC BLOOD PRESSURE: 164 MMHG | HEART RATE: 82 BPM | TEMPERATURE: 97 F | RESPIRATION RATE: 16 BRPM | DIASTOLIC BLOOD PRESSURE: 84 MMHG

## 2020-07-16 DIAGNOSIS — C18.2 MALIGNANT NEOPLASM OF ASCENDING COLON (HCC): ICD-10-CM

## 2020-07-16 DIAGNOSIS — R11.12 PROJECTILE VOMITING WITH NAUSEA: Primary | ICD-10-CM

## 2020-07-16 DIAGNOSIS — D50.0 IRON DEFICIENCY ANEMIA DUE TO CHRONIC BLOOD LOSS: ICD-10-CM

## 2020-07-16 DIAGNOSIS — Z92.21 HISTORY OF ANTINEOPLASTIC CHEMOTHERAPY: ICD-10-CM

## 2020-07-16 DIAGNOSIS — R11.2 CHEMOTHERAPY INDUCED NAUSEA AND VOMITING: ICD-10-CM

## 2020-07-16 DIAGNOSIS — T45.1X5A CHEMOTHERAPY INDUCED NAUSEA AND VOMITING: ICD-10-CM

## 2020-07-16 DIAGNOSIS — E86.1 HYPOVOLEMIA: ICD-10-CM

## 2020-07-16 DIAGNOSIS — E87.6 HYPOKALEMIA: ICD-10-CM

## 2020-07-16 LAB
ALBUMIN SERPL-MCNC: 4 G/DL (ref 3.4–5)
ALBUMIN/GLOB SERPL: 0.8 {RATIO} (ref 1–2)
ALP LIVER SERPL-CCNC: 133 U/L (ref 39–100)
ALT SERPL-CCNC: 69 U/L (ref 13–56)
ANION GAP SERPL CALC-SCNC: 9 MMOL/L (ref 0–18)
AST SERPL-CCNC: 54 U/L (ref 15–37)
BILIRUB SERPL-MCNC: 0.8 MG/DL (ref 0.1–2)
BUN BLD-MCNC: 13 MG/DL (ref 7–18)
BUN/CREAT SERPL: 15.7 (ref 10–20)
CALCIUM BLD-MCNC: 9.8 MG/DL (ref 8.5–10.1)
CHLORIDE SERPL-SCNC: 106 MMOL/L (ref 98–112)
CO2 SERPL-SCNC: 26 MMOL/L (ref 21–32)
CREAT BLD-MCNC: 0.83 MG/DL (ref 0.55–1.02)
GLOBULIN PLAS-MCNC: 4.9 G/DL (ref 2.8–4.4)
GLUCOSE BLD-MCNC: 223 MG/DL (ref 70–99)
M PROTEIN MFR SERPL ELPH: 8.9 G/DL (ref 6.4–8.2)
OSMOLALITY SERPL CALC.SUM OF ELEC: 299 MOSM/KG (ref 275–295)
PATIENT FASTING Y/N/NP: NO
POTASSIUM SERPL-SCNC: 3 MMOL/L (ref 3.5–5.1)
SODIUM SERPL-SCNC: 141 MMOL/L (ref 136–145)

## 2020-07-16 PROCEDURE — 99215 OFFICE O/P EST HI 40 MIN: CPT | Performed by: NURSE PRACTITIONER

## 2020-07-16 PROCEDURE — 96375 TX/PRO/DX INJ NEW DRUG ADDON: CPT

## 2020-07-16 PROCEDURE — 80053 COMPREHEN METABOLIC PANEL: CPT

## 2020-07-16 PROCEDURE — 96366 THER/PROPH/DIAG IV INF ADDON: CPT

## 2020-07-16 PROCEDURE — 96361 HYDRATE IV INFUSION ADD-ON: CPT

## 2020-07-16 PROCEDURE — 96365 THER/PROPH/DIAG IV INF INIT: CPT

## 2020-07-16 RX ORDER — 0.9 % SODIUM CHLORIDE 0.9 %
500 INTRAVENOUS SOLUTION INTRAVENOUS ONCE
Status: CANCELLED
Start: 2020-07-16

## 2020-07-16 RX ORDER — POTASSIUM CHLORIDE 14.9 MG/ML
INJECTION INTRAVENOUS
Status: COMPLETED
Start: 2020-07-16 | End: 2020-07-16

## 2020-07-16 RX ORDER — METOCLOPRAMIDE HYDROCHLORIDE 5 MG/ML
INJECTION INTRAMUSCULAR; INTRAVENOUS
Status: COMPLETED
Start: 2020-07-16 | End: 2020-07-16

## 2020-07-16 RX ORDER — POTASSIUM CHLORIDE 14.9 MG/ML
20 INJECTION INTRAVENOUS ONCE
Status: COMPLETED | OUTPATIENT
Start: 2020-07-16 | End: 2020-07-16

## 2020-07-16 RX ORDER — HEPARIN SODIUM (PORCINE) LOCK FLUSH IV SOLN 100 UNIT/ML 100 UNIT/ML
5 SOLUTION INTRAVENOUS ONCE
Status: CANCELLED | OUTPATIENT
Start: 2020-07-16

## 2020-07-16 RX ORDER — OLANZAPINE 5 MG/1
TABLET, ORALLY DISINTEGRATING ORAL
Qty: 6 TABLET | Refills: 0 | Status: ON HOLD | OUTPATIENT
Start: 2020-07-16 | End: 2020-07-22

## 2020-07-16 RX ORDER — 0.9 % SODIUM CHLORIDE 0.9 %
500 INTRAVENOUS SOLUTION INTRAVENOUS ONCE
Status: COMPLETED | OUTPATIENT
Start: 2020-07-16 | End: 2020-07-16

## 2020-07-16 RX ORDER — POTASSIUM CHLORIDE 14.9 MG/ML
20 INJECTION INTRAVENOUS ONCE
Status: CANCELLED
Start: 2020-07-16

## 2020-07-16 RX ORDER — METOCLOPRAMIDE HYDROCHLORIDE 5 MG/ML
10 INJECTION INTRAMUSCULAR; INTRAVENOUS ONCE
Status: CANCELLED
Start: 2020-07-16

## 2020-07-16 RX ORDER — ONDANSETRON 8 MG/1
8 TABLET, ORALLY DISINTEGRATING ORAL EVERY 8 HOURS PRN
Qty: 30 TABLET | Refills: 2 | Status: SHIPPED | OUTPATIENT
Start: 2020-07-16 | End: 2021-06-02 | Stop reason: ALTCHOICE

## 2020-07-16 RX ORDER — HEPARIN SODIUM (PORCINE) LOCK FLUSH IV SOLN 100 UNIT/ML 100 UNIT/ML
5 SOLUTION INTRAVENOUS ONCE
Status: COMPLETED | OUTPATIENT
Start: 2020-07-16 | End: 2020-07-16

## 2020-07-16 RX ORDER — 0.9 % SODIUM CHLORIDE 0.9 %
10 VIAL (ML) INJECTION ONCE
Status: CANCELLED | OUTPATIENT
Start: 2020-07-16

## 2020-07-16 RX ORDER — HEPARIN SODIUM (PORCINE) LOCK FLUSH IV SOLN 100 UNIT/ML 100 UNIT/ML
SOLUTION INTRAVENOUS
Status: COMPLETED
Start: 2020-07-16 | End: 2020-07-16

## 2020-07-16 RX ORDER — METOCLOPRAMIDE HYDROCHLORIDE 5 MG/ML
10 INJECTION INTRAMUSCULAR; INTRAVENOUS ONCE
Status: COMPLETED | OUTPATIENT
Start: 2020-07-16 | End: 2020-07-16

## 2020-07-16 RX ORDER — POTASSIUM CHLORIDE 29.8 MG/ML
40 INJECTION INTRAVENOUS ONCE
Status: CANCELLED
Start: 2020-07-16

## 2020-07-16 RX ORDER — 0.9 % SODIUM CHLORIDE 0.9 %
VIAL (ML) INJECTION
Status: DISCONTINUED
Start: 2020-07-16 | End: 2020-07-16

## 2020-07-16 RX ADMIN — HEPARIN SODIUM (PORCINE) LOCK FLUSH IV SOLN 100 UNIT/ML 500 UNITS: 100 SOLUTION INTRAVENOUS at 17:10:00

## 2020-07-16 RX ADMIN — POTASSIUM CHLORIDE 20 MEQ: 14.9 INJECTION INTRAVENOUS at 13:37:00

## 2020-07-16 RX ADMIN — POTASSIUM CHLORIDE 20 MEQ: 14.9 INJECTION INTRAVENOUS at 15:22:00

## 2020-07-16 RX ADMIN — METOCLOPRAMIDE HYDROCHLORIDE 10 MG: 5 INJECTION INTRAMUSCULAR; INTRAVENOUS at 16:58:00

## 2020-07-16 RX ADMIN — 0.9 % SODIUM CHLORIDE 500 ML: 0.9 % INTRAVENOUS SOLUTION INTRAVENOUS at 14:28:00

## 2020-07-16 NOTE — PROGRESS NOTES
Suhas Medina arrived to the infusion area via wheelchair. She has not bee able to keep anything down at home. Pt states she has thrown up multiple times at home. She tried to take compazine and zofran at home, but would just vomit right after taking it.  Port acc

## 2020-07-16 NOTE — TELEPHONE ENCOUNTER
Toxicities: C1 D1 FOLFOX on 7/13/2020   Hydration & antiemetic yesterday     Nausea/Vomiting     Nausea: Grade 2 (The patient has been alternating between compazine & zofran since receiving hydration yesterday. She is having continuous nausea.  She took com

## 2020-07-16 NOTE — PATIENT INSTRUCTIONS
STOP your current Zofran and Compazine tablets at home for nausea      START 2 new scripts I sent to your [facility] for nausea:  1) Zofran DISINTEGRATING TAB--take 1 tablet every 8 hours around-the-clock. This may easier to keep down!     2) Olanzapine (Zyp

## 2020-07-16 NOTE — PROGRESS NOTES
Cancer Center Progress Note    Patient Name: Stevo Wheeler   YOB: 1970   Medical Record Number: [id number]   Attending Physician: Suhail Francois M.D.      Chief Complaint:  Colon cancer    Oncology History:  Sigrid Delarosa is a 47year old female Family History:  Family History   Problem Relation Age of Onset   • Diabetes Father    • Colon Cancer Maternal Aunt 67   • Cancer Maternal Grandmother 80        stomach ca, non-smoker   • Cancer Niece 2        leukemia   • Genetic Disease Niece above    Vital Signs: There were no vitals taken for this visit. Physical Examination:  Performance Status:  General: Patient is alert and oriented x 3, not in acute distress. Fatigued. +vomiting in clinic. HEENT: EOMs intact. Oropharynx is clear.    C Proteins (block A9):  MLH1:   LOSS OF EXPRESSION  PMS2:    LOSS OF EXPRESSION  MSH2:   LOSS OF EXPRESSION. MSH6:   LOSS OF EXPRESSION.     Impression and Plan:  Colon adenocarcinoma, right-sided, MSI-unstable, stage II, high risk  - Discussed at tumor boar

## 2020-07-17 ENCOUNTER — TELEPHONE (OUTPATIENT)
Dept: HEMATOLOGY/ONCOLOGY | Facility: HOSPITAL | Age: 50
End: 2020-07-17

## 2020-07-17 NOTE — TELEPHONE ENCOUNTER
Patient mentions continuation of nausea/vomiting, but no new symptoms. No reports of anaphylaxis. Patient mentions getting some relief with olanzipine and prn zofran that were just started yesterday. She is not taking compazine as recommended.  Discussed th

## 2020-07-18 ENCOUNTER — HOSPITAL ENCOUNTER (INPATIENT)
Facility: HOSPITAL | Age: 50
LOS: 4 days | Discharge: HOME | DRG: 073 | End: 2020-07-22
Attending: EMERGENCY MEDICINE | Admitting: HOSPITALIST
Payer: COMMERCIAL

## 2020-07-18 ENCOUNTER — APPOINTMENT (OUTPATIENT)
Dept: ULTRASOUND IMAGING | Facility: HOSPITAL | Age: 50
DRG: 073 | End: 2020-07-18
Attending: HOSPITALIST
Payer: COMMERCIAL

## 2020-07-18 ENCOUNTER — APPOINTMENT (OUTPATIENT)
Dept: CT IMAGING | Facility: HOSPITAL | Age: 50
DRG: 073 | End: 2020-07-18
Attending: EMERGENCY MEDICINE
Payer: COMMERCIAL

## 2020-07-18 DIAGNOSIS — E87.6 HYPOKALEMIA: ICD-10-CM

## 2020-07-18 DIAGNOSIS — R11.2 INTRACTABLE NAUSEA AND VOMITING: Primary | ICD-10-CM

## 2020-07-18 LAB
ALBUMIN SERPL-MCNC: 3.9 G/DL (ref 3.4–5)
ALP LIVER SERPL-CCNC: 115 U/L (ref 39–100)
ALT SERPL-CCNC: 83 U/L (ref 13–56)
ANION GAP SERPL CALC-SCNC: 6 MMOL/L (ref 0–18)
AST SERPL-CCNC: 73 U/L (ref 15–37)
BASOPHILS # BLD AUTO: 0.04 X10(3) UL (ref 0–0.2)
BASOPHILS NFR BLD AUTO: 0.6 %
BILIRUB DIRECT SERPL-MCNC: 0.2 MG/DL (ref 0–0.2)
BILIRUB SERPL-MCNC: 0.9 MG/DL (ref 0.1–2)
BUN BLD-MCNC: 12 MG/DL (ref 7–18)
BUN/CREAT SERPL: 16.7 (ref 10–20)
CALCIUM BLD-MCNC: 9.2 MG/DL (ref 8.5–10.1)
CHLORIDE SERPL-SCNC: 102 MMOL/L (ref 98–112)
CO2 SERPL-SCNC: 31 MMOL/L (ref 21–32)
CREAT BLD-MCNC: 0.72 MG/DL (ref 0.55–1.02)
DEPRECATED RDW RBC AUTO: 48.3 FL (ref 35.1–46.3)
EOSINOPHIL # BLD AUTO: 0.01 X10(3) UL (ref 0–0.7)
EOSINOPHIL NFR BLD AUTO: 0.1 %
ERYTHROCYTE [DISTWIDTH] IN BLOOD BY AUTOMATED COUNT: 17.4 % (ref 11–15)
GLUCOSE BLD-MCNC: 189 MG/DL (ref 70–99)
GLUCOSE BLDC GLUCOMTR-MCNC: 127 MG/DL (ref 70–99)
GLUCOSE BLDC GLUCOMTR-MCNC: 170 MG/DL (ref 70–99)
HAV IGM SER QL: 2.3 MG/DL (ref 1.6–2.6)
HCT VFR BLD AUTO: 38 % (ref 35–48)
HGB BLD-MCNC: 12.5 G/DL (ref 12–16)
IMM GRANULOCYTES # BLD AUTO: 0.01 X10(3) UL (ref 0–1)
IMM GRANULOCYTES NFR BLD: 0.1 %
LIPASE SERPL-CCNC: 176 U/L (ref 73–393)
LYMPHOCYTES # BLD AUTO: 1.93 X10(3) UL (ref 1–4)
LYMPHOCYTES NFR BLD AUTO: 26.8 %
M PROTEIN MFR SERPL ELPH: 8.3 G/DL (ref 6.4–8.2)
MCH RBC QN AUTO: 25.2 PG (ref 26–34)
MCHC RBC AUTO-ENTMCNC: 32.9 G/DL (ref 31–37)
MCV RBC AUTO: 76.6 FL (ref 80–100)
MONOCYTES # BLD AUTO: 0.2 X10(3) UL (ref 0.1–1)
MONOCYTES NFR BLD AUTO: 2.8 %
NEUTROPHILS # BLD AUTO: 5.02 X10 (3) UL (ref 1.5–7.7)
NEUTROPHILS # BLD AUTO: 5.02 X10(3) UL (ref 1.5–7.7)
NEUTROPHILS NFR BLD AUTO: 69.6 %
OSMOLALITY SERPL CALC.SUM OF ELEC: 293 MOSM/KG (ref 275–295)
PHOSPHATE SERPL-MCNC: 3.4 MG/DL (ref 2.5–4.9)
PLATELET # BLD AUTO: 457 10(3)UL (ref 150–450)
POTASSIUM SERPL-SCNC: 2.6 MMOL/L (ref 3.5–5.1)
RBC # BLD AUTO: 4.96 X10(6)UL (ref 3.8–5.3)
SARS-COV-2 RNA RESP QL NAA+PROBE: NOT DETECTED
SODIUM SERPL-SCNC: 139 MMOL/L (ref 136–145)
WBC # BLD AUTO: 7.2 X10(3) UL (ref 4–11)

## 2020-07-18 PROCEDURE — 99255 IP/OBS CONSLTJ NEW/EST HI 80: CPT | Performed by: INTERNAL MEDICINE

## 2020-07-18 PROCEDURE — 76705 ECHO EXAM OF ABDOMEN: CPT | Performed by: HOSPITALIST

## 2020-07-18 PROCEDURE — 74177 CT ABD & PELVIS W/CONTRAST: CPT | Performed by: EMERGENCY MEDICINE

## 2020-07-18 PROCEDURE — 99253 IP/OBS CNSLTJ NEW/EST LOW 45: CPT | Performed by: INTERNAL MEDICINE

## 2020-07-18 RX ORDER — METOCLOPRAMIDE HYDROCHLORIDE 5 MG/ML
10 INJECTION INTRAMUSCULAR; INTRAVENOUS EVERY 8 HOURS PRN
Status: DISCONTINUED | OUTPATIENT
Start: 2020-07-18 | End: 2020-07-18

## 2020-07-18 RX ORDER — SODIUM CHLORIDE 0.9 % (FLUSH) 0.9 %
3 SYRINGE (ML) INJECTION AS NEEDED
Status: DISCONTINUED | OUTPATIENT
Start: 2020-07-18 | End: 2020-07-22

## 2020-07-18 RX ORDER — ONDANSETRON 2 MG/ML
4 INJECTION INTRAMUSCULAR; INTRAVENOUS ONCE
Status: COMPLETED | OUTPATIENT
Start: 2020-07-18 | End: 2020-07-18

## 2020-07-18 RX ORDER — ENOXAPARIN SODIUM 100 MG/ML
40 INJECTION SUBCUTANEOUS DAILY
Status: DISCONTINUED | OUTPATIENT
Start: 2020-07-18 | End: 2020-07-22

## 2020-07-18 RX ORDER — POTASSIUM CHLORIDE 14.9 MG/ML
INJECTION INTRAVENOUS
Status: COMPLETED
Start: 2020-07-18 | End: 2020-07-18

## 2020-07-18 RX ORDER — DEXTROSE AND SODIUM CHLORIDE 5; .45 G/100ML; G/100ML
INJECTION, SOLUTION INTRAVENOUS CONTINUOUS
Status: ACTIVE | OUTPATIENT
Start: 2020-07-18 | End: 2020-07-18

## 2020-07-18 RX ORDER — CETIRIZINE HYDROCHLORIDE 10 MG/1
10 TABLET ORAL DAILY
Status: DISCONTINUED | OUTPATIENT
Start: 2020-07-18 | End: 2020-07-22

## 2020-07-18 RX ORDER — HYDRALAZINE HYDROCHLORIDE 20 MG/ML
10 INJECTION INTRAMUSCULAR; INTRAVENOUS EVERY 4 HOURS PRN
Status: DISCONTINUED | OUTPATIENT
Start: 2020-07-18 | End: 2020-07-22

## 2020-07-18 RX ORDER — ACETAMINOPHEN 325 MG/1
650 TABLET ORAL EVERY 6 HOURS PRN
Status: DISCONTINUED | OUTPATIENT
Start: 2020-07-18 | End: 2020-07-18

## 2020-07-18 RX ORDER — METOCLOPRAMIDE HYDROCHLORIDE 5 MG/ML
10 INJECTION INTRAMUSCULAR; INTRAVENOUS EVERY 6 HOURS
Status: DISCONTINUED | OUTPATIENT
Start: 2020-07-18 | End: 2020-07-22

## 2020-07-18 RX ORDER — ACETAMINOPHEN 500 MG
1000 TABLET ORAL EVERY 6 HOURS PRN
Status: DISCONTINUED | OUTPATIENT
Start: 2020-07-18 | End: 2020-07-22

## 2020-07-18 RX ORDER — POTASSIUM CHLORIDE 14.9 MG/ML
20 INJECTION INTRAVENOUS ONCE
Status: DISCONTINUED | OUTPATIENT
Start: 2020-07-18 | End: 2020-07-18

## 2020-07-18 RX ORDER — SODIUM CHLORIDE 9 MG/ML
INJECTION, SOLUTION INTRAVENOUS CONTINUOUS
Status: DISCONTINUED | OUTPATIENT
Start: 2020-07-18 | End: 2020-07-22

## 2020-07-18 RX ORDER — METOCLOPRAMIDE HYDROCHLORIDE 5 MG/ML
5 INJECTION INTRAMUSCULAR; INTRAVENOUS ONCE
Status: COMPLETED | OUTPATIENT
Start: 2020-07-18 | End: 2020-07-18

## 2020-07-18 RX ORDER — ONDANSETRON 2 MG/ML
4 INJECTION INTRAMUSCULAR; INTRAVENOUS EVERY 6 HOURS PRN
Status: DISCONTINUED | OUTPATIENT
Start: 2020-07-18 | End: 2020-07-22

## 2020-07-18 RX ORDER — SODIUM CHLORIDE, SODIUM LACTATE, POTASSIUM CHLORIDE, CALCIUM CHLORIDE 600; 310; 30; 20 MG/100ML; MG/100ML; MG/100ML; MG/100ML
INJECTION, SOLUTION INTRAVENOUS CONTINUOUS
Status: DISCONTINUED | OUTPATIENT
Start: 2020-07-18 | End: 2020-07-18

## 2020-07-18 RX ORDER — HYDRALAZINE HYDROCHLORIDE 20 MG/ML
INJECTION INTRAMUSCULAR; INTRAVENOUS
Status: COMPLETED
Start: 2020-07-18 | End: 2020-07-18

## 2020-07-18 RX ORDER — OLANZAPINE 5 MG/1
5 TABLET, ORALLY DISINTEGRATING ORAL DAILY
Status: COMPLETED | OUTPATIENT
Start: 2020-07-18 | End: 2020-07-20

## 2020-07-18 RX ORDER — MORPHINE SULFATE 4 MG/ML
2 INJECTION, SOLUTION INTRAMUSCULAR; INTRAVENOUS ONCE
Status: COMPLETED | OUTPATIENT
Start: 2020-07-18 | End: 2020-07-18

## 2020-07-18 RX ORDER — PROCHLORPERAZINE EDISYLATE 5 MG/ML
5 INJECTION INTRAMUSCULAR; INTRAVENOUS ONCE
Status: DISCONTINUED | OUTPATIENT
Start: 2020-07-18 | End: 2020-07-18

## 2020-07-18 RX ORDER — DEXTROSE MONOHYDRATE 25 G/50ML
50 INJECTION, SOLUTION INTRAVENOUS
Status: DISCONTINUED | OUTPATIENT
Start: 2020-07-18 | End: 2020-07-22

## 2020-07-18 NOTE — H&P
[facility] Hospitalist H&P       CC: Patient presents with:  Vomiting       PCP: None Pcp    History of Present Illness:  Patient is a 47year old female with PMH sig for colon cancer, uncontrolled DMII, started on chemotherapy 6 days ago and has had n/v since mouth daily. , Disp: , Rfl:   cetirizine 10 MG Oral Tab, Take 10 mg by mouth as needed for Allergies. , Disp: , Rfl:         Scheduled Medication:  • Prochlorperazine Edisylate  5 mg Intravenous Once   • cetirizine  10 mg Oral Daily   • enoxaparin  40 mg Sub HGB 12.5   MCV 76.6*   .0*   NE 5.02   LYMABS 1.93       Recent Labs   Lab 07/16/20  1210 07/18/20  0317    139   K 3.0* 2.6*    102   CO2 26.0 31.0   BUN 13 12   CREATSERUM 0.83 0.72   CA 9.8 9.2   MG  --  2.3   PHOS  --  3.4   GLU 22 colon  SEDATION:   Dr. Sai Brennan, the supervising physician, provided 30 minutes of moderate sedation services for this procedure with the assistance of an independent trained observer who had no other duties during this procedure.   Details of the sedation are CONCLUSION: Ultrasound and fluoroscopic guided placement of chest port     Dictated by (CST): George Hernandez MD on 7/07/2020 at 8:29 PM     Finalized by (CST): George Hernandez MD on 7/07/2020 at 8:30 PM          Ct Abdomen Pelvis Iv Contrast, No Oral thickening, lesion or calculus. PELVIC NODES: No enlarged mass or adenopathy. PELVIC ORGANS: Anteverted uterus BONES:   Levoscoliosis dorsal lumbar spine multilevel spondylosis. Mild hip osteoarthritis.  LUNG BASES: No visible pulmonary or pleural disea

## 2020-07-18 NOTE — ED NOTES
Port a Cath to Right upper chest accessed by this RN with a 20G by 0.75 inches Safe Step Brody Needle set using Sterile Technique as per protocol. Blood return noted, and specimens collected from this line. IV fluids initiated and IV meds provided.  Patient

## 2020-07-18 NOTE — ED PROVIDER NOTES
Patient Seen in: [facility] Emergency Department    History   No chief complaint on file.     Stated Complaint: Vomiting     HPI    76-year-old female with past medical history of colon cancer status post right-sided hemicolectomy with recent initiat Phosphate 50 MG Oral Tab,  Take 50 mg by mouth daily. cetirizine 10 MG Oral Tab,  Take 10 mg by mouth as needed for Allergies.        Family History   Problem Relation Age of Onset   • Diabetes Father    • Colon Cancer Maternal Aunt 67   • Cancer Maternal peritonitis. Musculoskeletal: No gross deformity. Neurological: Alert. Skin: Skin is warm. Psychiatric: Cooperative. Nursing note and vitals reviewed.         ED Course     Labs Reviewed   BASIC METABOLIC PANEL (8) - Abnormal; Notable for the followi ca.  Other Notes (entered by Technologist): ROOM 38/ [id number]    Additional Information (per Vision Radiologist): Abdominal pain and vomiting since Monday.   Recently started chemotherapy forcolonCA      CT abdomen and pelvis with contrast      LYUDMILAI otherwise. Abdomen soft and nondistended/nonperitonitic, labs notable for profound hypokalemia with ongoing nausea/vomiting despite multiple antiemetics; will admit for ongoing management.   On-call medicine/DMG Dr. Schwab Aus graciously admitting wit

## 2020-07-18 NOTE — CONSULTS
-Continue home dose Lipitor.   Leda Carreno 89   Gastroenterology Consultation Note    Susanneleana Shaver  Patient Status:    Inpatient  Date of Admission:         7/18/2020, Hospital day #0  Attending:   Festus Tian MD  PCP:     None Pcp    Reason for Consultation:  Ayesha Bill never smoked. She has never used smokeless tobacco. She reports current alcohol use of about 1.0 standard drinks of alcohol per week. She reports that she does not use drugs.     Allergies:    Codeine                 NAUSEA ONLY    Medications:    Current F temperature 98.5 °F (36.9 °C), temperature source Oral, resp. rate 18, height 4' 8\" (1.422 m), weight 121 lb 4.8 oz (55 kg), SpO2 100 %. Body mass index is 27.19 kg/m².     Gen- Patient appears comfortable, but fatigue, no retching  HEENT: the sclera appea elevations. #Abnormal LFTs-mild transaminitis with elevations in AST ALT and mildly elevated alkaline phosphatase. Likely secondary to dehydration as well as chemotherapy side effect.   This can be monitored as her CT does not show any significant findi

## 2020-07-18 NOTE — CONSULTS
Oncology Consultation Note    Patient Name: Sarita Graff   YOB: 1970   Medical Record Number: [id number]   Attending Physician: Beatrice Pablo M.D.      Chief Complaint:  Colon cancer admitted with nausea/vomiting    Oncology History:  Fany Flores History:  Family History   Problem Relation Age of Onset   • Diabetes Father    • Colon Cancer Maternal Aunt 67   • Cancer Maternal Grandmother 80        stomach ca, non-smoker   • Cancer Niece 2        leukemia   • Genetic Disease Niece         hemifacial  07/18/2020    CO2 31.0 07/18/2020     US abdomen 7/18/2020    CONCLUSION:   1. Negative hepatic sonogram.  No intrahepatic biliary dilatation. 2. Cholecystectomy. Prominent common bile duct measures 10 mm.   Correlate with liver enzymes to exclu given high risk features and MMR-loss with limited if any benefit of FU alone.     -C1D1 FOLFOX on 7/13/20.       Thrombosytosis- likely reactive from recent surgery/inflammation; continue to monitor, mildly above upper limit of normal     MMR loss  Family

## 2020-07-18 NOTE — PLAN OF CARE
Suhas Medina arrived from ED this morning. Patient is alert and oriented x4, room air. Patient complains of abdominal pain with vomiting but is controlled at rest. Continues to have some nausea, but states is improved.  Ambulated to bathroom with steady gait, ind

## 2020-07-18 NOTE — ED NOTES
Orders for admission, patient is aware of plan and ready to go upstairs. Any questions, please call ED RN Gogo at extension [phone]. Patient arrived to ED from triage for c/o intractable vomiting since Monday.  Patient hx of colon cancer, had first chemo t

## 2020-07-19 LAB
ALBUMIN SERPL-MCNC: 3.2 G/DL (ref 3.4–5)
ALBUMIN/GLOB SERPL: 0.8 {RATIO} (ref 1–2)
ALP LIVER SERPL-CCNC: 105 U/L (ref 39–100)
ALT SERPL-CCNC: 75 U/L (ref 13–56)
ANION GAP SERPL CALC-SCNC: 10 MMOL/L (ref 0–18)
AST SERPL-CCNC: 53 U/L (ref 15–37)
BASOPHILS # BLD AUTO: 0.02 X10(3) UL (ref 0–0.2)
BASOPHILS NFR BLD AUTO: 0.3 %
BILIRUB SERPL-MCNC: 0.7 MG/DL (ref 0.1–2)
BUN BLD-MCNC: 6 MG/DL (ref 7–18)
BUN/CREAT SERPL: 11.3 (ref 10–20)
CALCIUM BLD-MCNC: 8.7 MG/DL (ref 8.5–10.1)
CHLORIDE SERPL-SCNC: 104 MMOL/L (ref 98–112)
CO2 SERPL-SCNC: 25 MMOL/L (ref 21–32)
CREAT BLD-MCNC: 0.53 MG/DL (ref 0.55–1.02)
DEPRECATED RDW RBC AUTO: 47.3 FL (ref 35.1–46.3)
EOSINOPHIL # BLD AUTO: 0.11 X10(3) UL (ref 0–0.7)
EOSINOPHIL NFR BLD AUTO: 1.8 %
ERYTHROCYTE [DISTWIDTH] IN BLOOD BY AUTOMATED COUNT: 17.2 % (ref 11–15)
GLOBULIN PLAS-MCNC: 4.1 G/DL (ref 2.8–4.4)
GLUCOSE BLD-MCNC: 119 MG/DL (ref 70–99)
GLUCOSE BLDC GLUCOMTR-MCNC: 118 MG/DL (ref 70–99)
GLUCOSE BLDC GLUCOMTR-MCNC: 118 MG/DL (ref 70–99)
GLUCOSE BLDC GLUCOMTR-MCNC: 127 MG/DL (ref 70–99)
GLUCOSE BLDC GLUCOMTR-MCNC: 129 MG/DL (ref 70–99)
GLUCOSE BLDC GLUCOMTR-MCNC: 151 MG/DL (ref 70–99)
HAV IGM SER QL: 2.1 MG/DL (ref 1.6–2.6)
HCT VFR BLD AUTO: 37.3 % (ref 35–48)
HGB BLD-MCNC: 12.3 G/DL (ref 12–16)
IMM GRANULOCYTES # BLD AUTO: 0.01 X10(3) UL (ref 0–1)
IMM GRANULOCYTES NFR BLD: 0.2 %
LYMPHOCYTES # BLD AUTO: 1.89 X10(3) UL (ref 1–4)
LYMPHOCYTES NFR BLD AUTO: 31.7 %
M PROTEIN MFR SERPL ELPH: 7.3 G/DL (ref 6.4–8.2)
MCH RBC QN AUTO: 25.3 PG (ref 26–34)
MCHC RBC AUTO-ENTMCNC: 33 G/DL (ref 31–37)
MCV RBC AUTO: 76.6 FL (ref 80–100)
MONOCYTES # BLD AUTO: 0.29 X10(3) UL (ref 0.1–1)
MONOCYTES NFR BLD AUTO: 4.9 %
NEUTROPHILS # BLD AUTO: 3.64 X10 (3) UL (ref 1.5–7.7)
NEUTROPHILS # BLD AUTO: 3.64 X10(3) UL (ref 1.5–7.7)
NEUTROPHILS NFR BLD AUTO: 61.1 %
OSMOLALITY SERPL CALC.SUM OF ELEC: 287 MOSM/KG (ref 275–295)
PLATELET # BLD AUTO: 406 10(3)UL (ref 150–450)
POTASSIUM SERPL-SCNC: 2.9 MMOL/L (ref 3.5–5.1)
POTASSIUM SERPL-SCNC: 3.5 MMOL/L (ref 3.5–5.1)
RBC # BLD AUTO: 4.87 X10(6)UL (ref 3.8–5.3)
SODIUM SERPL-SCNC: 139 MMOL/L (ref 136–145)
WBC # BLD AUTO: 6 X10(3) UL (ref 4–11)

## 2020-07-19 PROCEDURE — 99232 SBSQ HOSP IP/OBS MODERATE 35: CPT | Performed by: INTERNAL MEDICINE

## 2020-07-19 RX ORDER — MECLIZINE HYDROCHLORIDE 25 MG/1
25 TABLET ORAL EVERY 8 HOURS PRN
Status: DISCONTINUED | OUTPATIENT
Start: 2020-07-19 | End: 2020-07-22

## 2020-07-19 RX ORDER — POTASSIUM CHLORIDE 29.8 MG/ML
40 INJECTION INTRAVENOUS ONCE
Status: COMPLETED | OUTPATIENT
Start: 2020-07-19 | End: 2020-07-20

## 2020-07-19 RX ORDER — POTASSIUM CHLORIDE 14.9 MG/ML
20 INJECTION INTRAVENOUS ONCE
Status: COMPLETED | OUTPATIENT
Start: 2020-07-19 | End: 2020-07-19

## 2020-07-19 RX ORDER — FAMOTIDINE 20 MG/1
20 TABLET ORAL 2 TIMES DAILY
Status: DISCONTINUED | OUTPATIENT
Start: 2020-07-19 | End: 2020-07-22

## 2020-07-19 RX ORDER — POTASSIUM CHLORIDE 29.8 MG/ML
40 INJECTION INTRAVENOUS ONCE
Status: COMPLETED | OUTPATIENT
Start: 2020-07-19 | End: 2020-07-19

## 2020-07-19 NOTE — PLAN OF CARE
Plan of care reviewed with MIGUEL [facility]  Patient continues to have nausea- however states the reglan/ Tamea Pinta has helped  One episode of emesis this evening  NPO-IVF   Safety measures in place and call light within reach    Problem: Diabetes/Glucose Control  Goal

## 2020-07-19 NOTE — PROGRESS NOTES
[facility]     Gastroenterology Progress Note    Danni Vargas Patient Status:  Inpatient    1970 MRN [id number]   Location [facility] 4W/SW/SE Attending Stanislav Thomas MD   Hosp Day # 1 PCP None Pcp       Subjective 12.3 07/19/2020    HCT 37.3 07/19/2020    .0 07/19/2020    CREATSERUM 0.53 (L) 07/19/2020    BUN 6 (L) 07/19/2020     07/19/2020    K 2.9 (LL) 07/19/2020     07/19/2020    CO2 25.0 07/19/2020     (H) 07/19/2020    CA 8.7 07/19/202

## 2020-07-19 NOTE — PROGRESS NOTES
[facility]  Hematology/Oncology  Progress Note    Pierre Moss Patient Status:  Inpatient    1970 MRN [id number]   Location [facility] 4W/SW/SE Attending Mariah Rodgers MD   Hosp Day # 1 PCP None Pcp     Patient with hx o recommend  for FOLFOX x 3 months given high risk features and MMR-loss with limited if any benefit of FU alone.     -C1D1 FOLFOX on 7/13/20.        Thrombosytosis- RESOLVED. likely reactive from recent surgery/inflammation     MMR loss  Family history of GI 07/19/2020    TP 7.3 07/19/2020    AST 53 (H) 07/19/2020    ALT 75 (H) 07/19/2020     07/18/2020    MG 2.1 07/19/2020    PHOS 3.4 07/18/2020    TROP <0.045 06/06/2020    B12 571 06/29/2020       Ct Abdomen Pelvis Iv Contrast, No Oral (er)    Result

## 2020-07-19 NOTE — PLAN OF CARE
Plan of care reviewed with Andria  Emesis x1- per patient nausea has improved from yesterday  Advanced to clear liquids  Safety measures in place and call light within reach    1830. This evening patient mentioned she was feeling very dizzy.  When asked if patient and family knowledge, values, beliefs, and cultural backgrounds into the planning and delivery of care  - Encourage patient/family to participate in care and decision-making at the level they choose  - Honor patient and family perspectives and jesus

## 2020-07-19 NOTE — PROGRESS NOTES
[facility] Hospitalist Progress Note     Yesenia Jasso Patient Status:  Inpatient    1970 MRN [id number]   Location [facility] 4W/SW/SE Attending Jimmy Fox MD   [address] Day # 1 PCP None Pcp       Assessment/Plan:       # Transaminitis, dilated rashes or lesions    Data Review:       Labs:     Recent Labs   Lab 07/18/20  0317 07/19/20  0637   WBC 7.2 6.0   HGB 12.5 12.3   MCV 76.6* 76.6*   .0* 406.0   NE 5.02 3.64   LYMABS 1.93 1.89       Recent Labs   Lab 07/16/20  1210 07/18/20 0317 07/

## 2020-07-19 NOTE — PLAN OF CARE
Patient still with ongoing nausea/vomiting, emesis x2 overnight. Managed with scheduled reglan and PRN zofran. NPO maintained. MD notified of elevated BP, PRN hydralazine given with improvement. No complaints of pain. IVF running. Up ad dolly.  Voiding freely ADULT  Goal: Minimal or absence of nausea and vomiting  Description  INTERVENTIONS:  - Maintain adequate hydration with IV or PO as ordered and tolerated  - Nasogastric tube to low intermittent suction as ordered  - Evaluate effectiveness of ordered antiem

## 2020-07-20 LAB
ADENOVIRUS F 40/41 PCR: NEGATIVE
ALBUMIN SERPL-MCNC: 3.3 G/DL (ref 3.4–5)
ALBUMIN/GLOB SERPL: 0.9 {RATIO} (ref 1–2)
ALP LIVER SERPL-CCNC: 103 U/L (ref 39–100)
ALT SERPL-CCNC: 69 U/L (ref 13–56)
ANION GAP SERPL CALC-SCNC: 5 MMOL/L (ref 0–18)
AST SERPL-CCNC: 43 U/L (ref 15–37)
ASTROVIRUS PCR: NEGATIVE
BILIRUB SERPL-MCNC: 0.7 MG/DL (ref 0.1–2)
BUN BLD-MCNC: 6 MG/DL (ref 7–18)
BUN/CREAT SERPL: 10.3 (ref 10–20)
C CAYETANENSIS DNA SPEC QL NAA+PROBE: NEGATIVE
C DIFF TOX B STL QL: NEGATIVE
CALCIUM BLD-MCNC: 8.8 MG/DL (ref 8.5–10.1)
CAMPY SP DNA.DIARRHEA STL QL NAA+PROBE: NEGATIVE
CHLORIDE SERPL-SCNC: 107 MMOL/L (ref 98–112)
CO2 SERPL-SCNC: 25 MMOL/L (ref 21–32)
CREAT BLD-MCNC: 0.58 MG/DL (ref 0.55–1.02)
CRYPTOSP DNA SPEC QL NAA+PROBE: NEGATIVE
EAEC PAA PLAS AGGR+AATA ST NAA+NON-PRB: NEGATIVE
EC STX1+STX2 + H7 FLIC SPEC NAA+PROBE: NEGATIVE
ENTAMOEBA HISTOLYTICA PCR: NEGATIVE
EPEC EAE GENE STL QL NAA+NON-PROBE: NEGATIVE
ETEC LTA+ST1A+ST1B TOX ST NAA+NON-PROBE: NEGATIVE
GIARDIA LAMBLIA PCR: POSITIVE
GLOBULIN PLAS-MCNC: 3.8 G/DL (ref 2.8–4.4)
GLUCOSE BLD-MCNC: 95 MG/DL (ref 70–99)
GLUCOSE BLDC GLUCOMTR-MCNC: 128 MG/DL (ref 70–99)
GLUCOSE BLDC GLUCOMTR-MCNC: 145 MG/DL (ref 70–99)
GLUCOSE BLDC GLUCOMTR-MCNC: 155 MG/DL (ref 70–99)
GLUCOSE BLDC GLUCOMTR-MCNC: 93 MG/DL (ref 70–99)
M PROTEIN MFR SERPL ELPH: 7.1 G/DL (ref 6.4–8.2)
NOROVIRUS GI/GII PCR: NEGATIVE
OSMOLALITY SERPL CALC.SUM OF ELEC: 281 MOSM/KG (ref 275–295)
P SHIGELLOIDES DNA STL QL NAA+PROBE: NEGATIVE
POTASSIUM SERPL-SCNC: 3.8 MMOL/L (ref 3.5–5.1)
POTASSIUM SERPL-SCNC: 3.9 MMOL/L (ref 3.5–5.1)
ROTAVIRUS A PCR: NEGATIVE
SALMONELLA DNA SPEC QL NAA+PROBE: NEGATIVE
SAPOVIRUS PCR: NEGATIVE
SHIGELLA SP+EIEC IPAH ST NAA+NON-PROBE: NEGATIVE
SODIUM SERPL-SCNC: 137 MMOL/L (ref 136–145)
V CHOLERAE DNA SPEC QL NAA+PROBE: NEGATIVE
VIBRIO DNA SPEC NAA+PROBE: NEGATIVE
YERSINIA DNA SPEC NAA+PROBE: NEGATIVE

## 2020-07-20 PROCEDURE — 99232 SBSQ HOSP IP/OBS MODERATE 35: CPT | Performed by: PHYSICIAN ASSISTANT

## 2020-07-20 RX ORDER — METRONIDAZOLE 500 MG/1
500 TABLET ORAL EVERY 8 HOURS SCHEDULED
Status: DISCONTINUED | OUTPATIENT
Start: 2020-07-20 | End: 2020-07-22

## 2020-07-20 RX ORDER — POTASSIUM CHLORIDE 14.9 MG/ML
20 INJECTION INTRAVENOUS ONCE
Status: COMPLETED | OUTPATIENT
Start: 2020-07-20 | End: 2020-07-20

## 2020-07-20 NOTE — PAYOR COMM NOTE
--------------  ADMISSION REVIEW     Payor: Cande Jones BY NABIL  Subscriber #:  [id number]  Authorization Number: [id number]    Admit date: 7/18/20  Admit time: 6914       Admitting Physician: Lorraine Mejía MD  Attending Physician:  Natalya Sheppard, OLANZapine 5 MG Oral Tablet Dispersible,  Take 2 tablets (10 mg total) by mouth nightly for 1 day, THEN 1 tablet (5 mg total) nightly for 4 days.    Prochlorperazine Maleate (COMPAZINE) 10 mg tablet,  Take 1 tablet (10 mg total) by mouth every 6 (six) hour Positive for stated complaint: Vomiting  Other systems are as noted in HPI. Constitutional and vital signs reviewed. All other systems reviewed and negative except as noted above.     PSFH elements reviewed from today and agreed except as otherwise st CBC W/ DIFFERENTIAL[[id number]]          Abnormal            Final result                 Please view results for these tests on the individual orders.    URINALYSIS WITH CULTURE REFLEX   RAPID SARS-COV-2 BY PCR      EKG    Rate, intervals and axes as noted This report contains privileged and confidential information and is intended solely for the use of the individual or entity to which it is addressed.  If you are not the intended recipient of this report, you are hereby notified that any copying, distributi I was wearing at minimum a facemask and eye protection throughout this encounter with handwashing performed prior and after patient evaluation without personal hand/facial/oropharyngeal contact and gloves worn throughout encounter.  See note and/or contact Performed by Yariel Ramirez MD at [address]        ALL:    Codeine                 NAUSEA ONLY     HOME MEDS  ondansetron 8 MG Oral Tablet Dispersible, Take 1 tablet (8 mg total) by mouth every 8 (eight) hours as needed for Nausea (chemo-induced nausea • Diabetes Father    • Colon Cancer Maternal Aunt 67   • Cancer Maternal Grandmother 80        stomach ca, non-smoker   • Cancer Niece 2        leukemia   • Genetic Disease Niece         hemifacial microsomia   • Genetic Disease Niece         hemifacial mi PROCEDURE: US ABDOMEN LIMITED (SEZ=[id number])  COMPARISON:   [facility], CT ABDOMEN PELVIS IV CONTRAST NO ORAL (ER), 7/18/2020, 4:31 AM.  INDICATIONS:   Vomiting  TECHNIQUE:   Limited evaluation of the areas indicated in the order with gray sca PROCEDURE:  IR PORT A CATH INSERTION  INDICATIONS: C18.2 Malignant neoplasm of ascending colon  SEDATION:   Dr. Radha Ramos, the supervising physician, provided 30 minutes of moderate sedation services for this procedure with the assistance of an independent ynes Portacath. MEDICATIONS: Versed, fentanyl. COMPLICATIONS: None. OTHER: Negative.           CONCLUSION: Ultrasound and fluoroscopic guided placement of chest port     Dictated by (CST): Daniel Salguero MD on 7/07/2020 at 8:29 PM     Finalized by (CST): Howard PROCEDURE: CT ABDOMEN PELVIS IV CONTRAST NO ORAL (ER)  COMPARISON: [facility], CT CHEST+ABDOMEN+PELVIS(ALL CNTRST ONLY)(CPT=71260/18764), 6/02/2020, 12:37 PM.  INDICATIONS: Colonic Carcinoma here status post chemotherapy treatment on Monday provided via [facility] service without significant discrepancy. CONCLUSION:  1. Right hemicolectomy with ileocolic anastomosis patent. No obstructive pattern. 2. Colonic diverticulosis without diverticulitis. 3. Cholecystectomy.   No bilia # Transaminitis, dilated CBD  - pt with mild lower abd most likely 2/2 recurrent vomiting.  No upper abd pain reported  - CT a/p reviewed without acute process  - abd u/s notable for 10mm CBD  - I d/w Dr. Suzie Perez, apprec review  - on chart review, mild transa Recent Labs   Lab 07/16/20  1210 07/18/20  0317 07/19/20  0637    139 139   K 3.0* 2.6* 2.9*    102 104   CO2 26.0 31.0 25.0   BUN 13 12 6*   CREATSERUM 0.83 0.72 0.53*   CA 9.8 9.2 8.7   MG  --  2.3 2.1   PHOS  --  3.4  --    * 18 # Transaminitis, dilated CBD  - pt with mild lower abd most likely 2/2 recurrent vomiting.  No upper abd pain reported  - CT a/p reviewed without acute process  - abd u/s notable for 10mm CBD  - not new (noted June) and downtrending        # Intractable n/v 1210 07/18/20  0317 07/19/20  0637 07/19/20  1831 07/20/20  0701    139 139  --   --    K 3.0* 2.6* 2.9* 3.5 3.8    102 104  --   --    CO2 26.0 31.0 25.0  --   --    BUN 13 12 6*  --   --    CREATSERUM 0.83 0.72 0.53*  --   --    CA 9.8 9.2 8. 7/20/2020 0925 Given 40 mg Subcutaneous (Left Lower Abdomen) Mariana Zavaleta RN      famoTIDine (PEPCID) tab 20 mg     Date Action Dose Route User    7/20/2020 0924 Given 20 mg Oral Mariana Zavaleta RN    7/19/2020 2121 Given 20 mg Oral Yvon Brannon RN

## 2020-07-20 NOTE — PLAN OF CARE
Patient is alert and orientated. Enteric precautions maintained for giardia in the stool. Negative for C-diff. Blood pressure elevated throughout shift. No new orders at this time. Accuchecks WNL. No coverage needed. C/o intermittent nausea- Zofran given. Samella Marrow cultural backgrounds into the planning and delivery of care  - Encourage patient/family to participate in care and decision-making at the level they choose  - Honor patient and family perspectives and choices   Outcome: Progressing     Problem: Andres Ware

## 2020-07-20 NOTE — PLAN OF CARE
After meclizine dizzyness did subside. Nausea improved with zofran. No more emesis since evening.     Problem: Diabetes/Glucose Control  Goal: Glucose maintained within prescribed range  Description  INTERVENTIONS:  - Monitor Blood Glucose as ordered  - A ordered  - Evaluate effectiveness of ordered antiemetic medications  - Provide nonpharmacologic comfort measures as appropriate  - Advance diet as tolerated, if ordered  - Obtain nutritional consult as needed  - Evaluate fluid balance  Outcome: Progressing

## 2020-07-20 NOTE — PROGRESS NOTES
[facility]     Gastroenterology Progress Note    Danni Vargas Patient Status:  Inpatient    1970 MRN [id number]   Location [facility] 4W/SW/SE Attending Rios Shepard MD   Hosp Day # 2 PCP None Pcp     Subjective: -anti-emetics as outlined by oncology  -treatment options per oncology team  -IVF  -trend LFTs  -DVT ppx  -see above     Case discussed with Dr. Nilesh Cohen on-call and RN Ursula Cannon.     Lynnette Mariano  [facility], [facility] Gastroenterology  7/20/2020    Results:

## 2020-07-20 NOTE — PROGRESS NOTES
[facility] Hospitalist Progress Note     Sarita Graff Patient Status:  Inpatient    1970 MRN [id number]   Location [facility] 4W/SW/SE Attending Yasmine Hutson MD   [facility] Day # 2 PCP None Pcp       Assessment/Plan:       # Transaminitis, dilated Labs:     Recent Labs   Lab 07/18/20  0317 07/19/20  0637   WBC 7.2 6.0   HGB 12.5 12.3   MCV 76.6* 76.6*   .0* 406.0   NE 5.02 3.64   LYMABS 1.93 1.89       Recent Labs   Lab 07/16/20  1210 07/18/20  0317 07/19/20  0637 07/19/20  1831 07/20/20  0

## 2020-07-21 LAB
ALBUMIN SERPL-MCNC: 3 G/DL (ref 3.4–5)
ALBUMIN/GLOB SERPL: 0.8 {RATIO} (ref 1–2)
ALP LIVER SERPL-CCNC: 98 U/L (ref 39–100)
ALT SERPL-CCNC: 60 U/L (ref 13–56)
ANION GAP SERPL CALC-SCNC: 7 MMOL/L (ref 0–18)
AST SERPL-CCNC: 37 U/L (ref 15–37)
BASOPHILS # BLD AUTO: 0.02 X10(3) UL (ref 0–0.2)
BASOPHILS NFR BLD AUTO: 0.3 %
BILIRUB SERPL-MCNC: 0.6 MG/DL (ref 0.1–2)
BUN BLD-MCNC: 6 MG/DL (ref 7–18)
BUN/CREAT SERPL: 11.5 (ref 10–20)
CALCIUM BLD-MCNC: 8.1 MG/DL (ref 8.5–10.1)
CHLORIDE SERPL-SCNC: 108 MMOL/L (ref 98–112)
CO2 SERPL-SCNC: 25 MMOL/L (ref 21–32)
CREAT BLD-MCNC: 0.52 MG/DL (ref 0.55–1.02)
DEPRECATED RDW RBC AUTO: 46.3 FL (ref 35.1–46.3)
EOSINOPHIL # BLD AUTO: 0.54 X10(3) UL (ref 0–0.7)
EOSINOPHIL NFR BLD AUTO: 7.3 %
ERYTHROCYTE [DISTWIDTH] IN BLOOD BY AUTOMATED COUNT: 17.5 % (ref 11–15)
GLOBULIN PLAS-MCNC: 3.8 G/DL (ref 2.8–4.4)
GLUCOSE BLD-MCNC: 106 MG/DL (ref 70–99)
GLUCOSE BLDC GLUCOMTR-MCNC: 107 MG/DL (ref 70–99)
GLUCOSE BLDC GLUCOMTR-MCNC: 111 MG/DL (ref 70–99)
GLUCOSE BLDC GLUCOMTR-MCNC: 152 MG/DL (ref 70–99)
GLUCOSE BLDC GLUCOMTR-MCNC: 188 MG/DL (ref 70–99)
HAV IGM SER QL: 2.1 MG/DL (ref 1.6–2.6)
HCT VFR BLD AUTO: 34.7 % (ref 35–48)
HGB BLD-MCNC: 11.4 G/DL (ref 12–16)
IMM GRANULOCYTES # BLD AUTO: 0.03 X10(3) UL (ref 0–1)
IMM GRANULOCYTES NFR BLD: 0.4 %
LYMPHOCYTES # BLD AUTO: 2.31 X10(3) UL (ref 1–4)
LYMPHOCYTES NFR BLD AUTO: 31.3 %
M PROTEIN MFR SERPL ELPH: 6.8 G/DL (ref 6.4–8.2)
MCH RBC QN AUTO: 24.9 PG (ref 26–34)
MCHC RBC AUTO-ENTMCNC: 32.9 G/DL (ref 31–37)
MCV RBC AUTO: 75.9 FL (ref 80–100)
MONOCYTES # BLD AUTO: 0.35 X10(3) UL (ref 0.1–1)
MONOCYTES NFR BLD AUTO: 4.7 %
NEUTROPHILS # BLD AUTO: 4.12 X10 (3) UL (ref 1.5–7.7)
NEUTROPHILS # BLD AUTO: 4.12 X10(3) UL (ref 1.5–7.7)
NEUTROPHILS NFR BLD AUTO: 56 %
OSMOLALITY SERPL CALC.SUM OF ELEC: 288 MOSM/KG (ref 275–295)
PLATELET # BLD AUTO: 384 10(3)UL (ref 150–450)
POTASSIUM SERPL-SCNC: 3.2 MMOL/L (ref 3.5–5.1)
POTASSIUM SERPL-SCNC: 3.8 MMOL/L (ref 3.5–5.1)
RBC # BLD AUTO: 4.57 X10(6)UL (ref 3.8–5.3)
SODIUM SERPL-SCNC: 140 MMOL/L (ref 136–145)
WBC # BLD AUTO: 7.4 X10(3) UL (ref 4–11)

## 2020-07-21 PROCEDURE — 99232 SBSQ HOSP IP/OBS MODERATE 35: CPT | Performed by: INTERNAL MEDICINE

## 2020-07-21 PROCEDURE — 99232 SBSQ HOSP IP/OBS MODERATE 35: CPT | Performed by: PHYSICIAN ASSISTANT

## 2020-07-21 RX ORDER — POTASSIUM CHLORIDE 14.9 MG/ML
20 INJECTION INTRAVENOUS ONCE
Status: COMPLETED | OUTPATIENT
Start: 2020-07-21 | End: 2020-07-22

## 2020-07-21 RX ORDER — POTASSIUM CHLORIDE 29.8 MG/ML
40 INJECTION INTRAVENOUS ONCE
Status: COMPLETED | OUTPATIENT
Start: 2020-07-21 | End: 2020-07-22

## 2020-07-21 NOTE — PROGRESS NOTES
[facility] Hospitalist Progress Note     Kait Newman Patient Status:  Inpatient    1970 MRN [id number]   Location [facility] 4W/SW/SE Attending Tonie Ocampo MD   [facility] Day # 3 PCP None Pcp       Assessment/Plan:       # Intractable n/v  - kathleen 07/21/20  0540   WBC 7.2 6.0 7.4   HGB 12.5 12.3 11.4*   MCV 76.6* 76.6* 75.9*   .0* 406.0 384.0   NE 5.02 3.64 4.12   LYMABS 1.93 1.89 2.31       Recent Labs   Lab 07/16/20  1210 07/18/20  0317 07/19/20  0637 07/19/20  1831 07/20/20  0701 07/21/20 07/04/2020    COVID19 Not Detected 06/04/2020

## 2020-07-21 NOTE — PROGRESS NOTES
[facility]     Gastroenterology Progress Note    Analiliarachel Genao Patient Status:  Inpatient    1970 MRN [id number]   Location [facility] 4W/SW/SE Attending Tatum Herrera MD   Hosp Day # 3 PCP None Pcp     Subjective: abx for Giardia   -DVT ppx  -see above     Case discussed with Dr. Elaine Mtz on-call and RN Carlito Paul. GI to sign off - please call/page with questions. Patient to follow up outpatient as needed.     Ching Salas  [facility], [facility] Gastroenterology  7/21/2020

## 2020-07-21 NOTE — DIETARY NOTE
ADULT NUTRITION INITIAL ASSESSMENT    Pt is at high nutrition risk. Pt meets severe malnutrition criteria.       CRITERIA FOR MALNUTRITION DIAGNOSIS:  Criteria for severe malnutrition diagnosis: acute illness/injury related to wt loss greater than 7.5% i vomiting [R11.2]    PAST MEDICAL HISTORY   has a past medical history of Cancer ([facility] [address] and Diabetes ([facility] [address] ANTHROPOMETRICS:  HT: 142.2 cm (4' 8\")  WT: 55 kg (121 lb 4.8 oz)   BMI: Body mass index is 27.19 kg/m².   BMI CLASSIFICATION: 25-29.9 kg/m2 - ove Fat/Muscle Mass: well nourished per physical exam despite significant wt loss. - Fluid Accumulation: none per visual exam    - Skin Integrity: intact.     NUTRITION PRESCRIPTION:  Diet: Regular/General  Oral Supplements: bid as above  Estimated Nutriti

## 2020-07-21 NOTE — CONSULTS
Reason for Consultation: Tachycardia    Assessment/Plan:       1. Colon cancer  2. Chemotherapy induced nausea and vomitting  3.  Tachycardia  - sinus  - multifactorial: malnutrition, dehydration, deconditioning    PLAN:  - check Echo  - hydration  - supine mEq, 20 mEq, Intravenous, Once  metoprolol Tartrate (LOPRESSOR) tab 25 mg, 25 mg, Oral, 2x Daily(Beta Blocker)  metRONIDAZOLE (FLAGYL) tab 500 mg, 500 mg, Oral, Q8H KRISTIN  Meclizine HCl (ANTIVERT) tab 25 mg, 25 mg, Oral, Q8H PRN  Insulin Aspart Pen (NOVOLOG) hours):      CONS: well developed, well nourished. HEAD/FACE:no trauma, normocephalic. EYES:conjunctiva not injected, no xanthelasma. ENT:mucosa pink and moist. NECK:jugular venous pressure not elevated.  RESP:normal rate and rhythm, clear to auscultat

## 2020-07-21 NOTE — PLAN OF CARE
Patient is alert and orientated. Enteric precautions maintained for giardia in the stool. Accuchecks WNL. No coverage needed. Tolerating general diet. Denies pain at this time. Ambulating independently. Diarrhea has improved. IVF infusing.   Potassium cover and family knowledge, values, beliefs, and cultural backgrounds into the planning and delivery of care  - Encourage patient/family to participate in care and decision-making at the level they choose  - Honor patient and family perspectives and choices   Cathleen Michelle

## 2020-07-21 NOTE — PROGRESS NOTES
[facility]    Hematology/Oncology   Progress Note    Stevo Aus Patient Status:  Inpatient    1970 MRN [id number]   Location [facility] 4W/SW/SE Attending Renea Lefort, MD   Hosp Day # 3 PCP None Pcp     Subjective:

## 2020-07-21 NOTE — PLAN OF CARE
Problem: Diabetes/Glucose Control  Goal: Glucose maintained within prescribed range  Description  INTERVENTIONS:  - Monitor Blood Glucose as ordered  - Assess for signs and symptoms of hyperglycemia and hypoglycemia  - Administer ordered medications to m measures as appropriate  - Advance diet as tolerated, if ordered  - Obtain nutritional consult as needed  - Evaluate fluid balance  Outcome: Progressing     Problem: METABOLIC/FLUID AND ELECTROLYTES - ADULT  Goal: Glucose maintained within prescribed range

## 2020-07-22 ENCOUNTER — LAB REQUISITION (OUTPATIENT)
Dept: LAB | Facility: HOSPITAL | Age: 50
DRG: 073 | End: 2020-07-22
Payer: COMMERCIAL

## 2020-07-22 ENCOUNTER — APPOINTMENT (OUTPATIENT)
Dept: CV DIAGNOSTICS | Facility: HOSPITAL | Age: 50
DRG: 073 | End: 2020-07-22
Attending: INTERNAL MEDICINE
Payer: COMMERCIAL

## 2020-07-22 VITALS
RESPIRATION RATE: 18 BRPM | HEART RATE: 101 BPM | BODY MASS INDEX: 27.29 KG/M2 | OXYGEN SATURATION: 98 % | WEIGHT: 121.31 LBS | SYSTOLIC BLOOD PRESSURE: 146 MMHG | TEMPERATURE: 98 F | DIASTOLIC BLOOD PRESSURE: 87 MMHG | HEIGHT: 56 IN

## 2020-07-22 DIAGNOSIS — K63.89 OTHER SPECIFIED DISEASES OF INTESTINE: ICD-10-CM

## 2020-07-22 LAB
ALBUMIN SERPL-MCNC: 2.9 G/DL (ref 3.4–5)
ALBUMIN/GLOB SERPL: 0.8 {RATIO} (ref 1–2)
ALP LIVER SERPL-CCNC: 103 U/L (ref 39–100)
ALT SERPL-CCNC: 51 U/L (ref 13–56)
ANION GAP SERPL CALC-SCNC: 4 MMOL/L (ref 0–18)
AST SERPL-CCNC: 31 U/L (ref 15–37)
BASOPHILS # BLD AUTO: 0.02 X10(3) UL (ref 0–0.2)
BASOPHILS NFR BLD AUTO: 0.3 %
BILIRUB SERPL-MCNC: 0.4 MG/DL (ref 0.1–2)
BUN BLD-MCNC: 5 MG/DL (ref 7–18)
BUN/CREAT SERPL: 9.4 (ref 10–20)
CALCIUM BLD-MCNC: 8.3 MG/DL (ref 8.5–10.1)
CHLORIDE SERPL-SCNC: 110 MMOL/L (ref 98–112)
CO2 SERPL-SCNC: 28 MMOL/L (ref 21–32)
CREAT BLD-MCNC: 0.53 MG/DL (ref 0.55–1.02)
DEPRECATED RDW RBC AUTO: 47.6 FL (ref 35.1–46.3)
EOSINOPHIL # BLD AUTO: 0.35 X10(3) UL (ref 0–0.7)
EOSINOPHIL NFR BLD AUTO: 6 %
ERYTHROCYTE [DISTWIDTH] IN BLOOD BY AUTOMATED COUNT: 17.4 % (ref 11–15)
GLOBULIN PLAS-MCNC: 3.5 G/DL (ref 2.8–4.4)
GLUCOSE BLD-MCNC: 98 MG/DL (ref 70–99)
GLUCOSE BLDC GLUCOMTR-MCNC: 117 MG/DL (ref 70–99)
GLUCOSE BLDC GLUCOMTR-MCNC: 120 MG/DL (ref 70–99)
HAV IGM SER QL: 1.9 MG/DL (ref 1.6–2.6)
HCT VFR BLD AUTO: 32.4 % (ref 35–48)
HGB BLD-MCNC: 10.7 G/DL (ref 12–16)
IMM GRANULOCYTES # BLD AUTO: 0.01 X10(3) UL (ref 0–1)
IMM GRANULOCYTES NFR BLD: 0.2 %
LYMPHOCYTES # BLD AUTO: 2.36 X10(3) UL (ref 1–4)
LYMPHOCYTES NFR BLD AUTO: 40.6 %
M PROTEIN MFR SERPL ELPH: 6.4 G/DL (ref 6.4–8.2)
MCH RBC QN AUTO: 25.5 PG (ref 26–34)
MCHC RBC AUTO-ENTMCNC: 33 G/DL (ref 31–37)
MCV RBC AUTO: 77.1 FL (ref 80–100)
MONOCYTES # BLD AUTO: 0.44 X10(3) UL (ref 0.1–1)
MONOCYTES NFR BLD AUTO: 7.6 %
NEUTROPHILS # BLD AUTO: 2.63 X10 (3) UL (ref 1.5–7.7)
NEUTROPHILS # BLD AUTO: 2.63 X10(3) UL (ref 1.5–7.7)
NEUTROPHILS NFR BLD AUTO: 45.3 %
OSMOLALITY SERPL CALC.SUM OF ELEC: 291 MOSM/KG (ref 275–295)
PLATELET # BLD AUTO: 364 10(3)UL (ref 150–450)
POTASSIUM SERPL-SCNC: 3.2 MMOL/L (ref 3.5–5.1)
RBC # BLD AUTO: 4.2 X10(6)UL (ref 3.8–5.3)
SODIUM SERPL-SCNC: 142 MMOL/L (ref 136–145)
WBC # BLD AUTO: 5.8 X10(3) UL (ref 4–11)

## 2020-07-22 PROCEDURE — 93306 TTE W/DOPPLER COMPLETE: CPT | Performed by: INTERNAL MEDICINE

## 2020-07-22 PROCEDURE — 88363 XM ARCHIVE TISSUE MOLEC ANAL: CPT | Performed by: PATHOLOGY

## 2020-07-22 RX ORDER — POTASSIUM CHLORIDE 1.5 G/1.77G
40 POWDER, FOR SOLUTION ORAL EVERY 4 HOURS
Status: DISCONTINUED | OUTPATIENT
Start: 2020-07-22 | End: 2020-07-22

## 2020-07-22 RX ORDER — POTASSIUM CHLORIDE 1.5 G/1.77G
20 POWDER, FOR SOLUTION ORAL DAILY
Qty: 30 PACKET | Refills: 0 | Status: SHIPPED | OUTPATIENT
Start: 2020-07-22 | End: 2020-09-08 | Stop reason: ALTCHOICE

## 2020-07-22 RX ORDER — METRONIDAZOLE 500 MG/1
500 TABLET ORAL EVERY 8 HOURS SCHEDULED
Qty: 9 TABLET | Refills: 0 | Status: SHIPPED | OUTPATIENT
Start: 2020-07-22 | End: 2020-07-25

## 2020-07-22 RX ORDER — POTASSIUM CHLORIDE 1.5 G/1.77G
20 POWDER, FOR SOLUTION ORAL DAILY
Qty: 30 PACKET | Refills: 0 | Status: SHIPPED | OUTPATIENT
Start: 2020-07-22 | End: 2020-07-22

## 2020-07-22 NOTE — PLAN OF CARE
Problem: Diabetes/Glucose Control  Goal: Glucose maintained within prescribed range  Description  INTERVENTIONS:  - Monitor Blood Glucose as ordered  - Assess for signs and symptoms of hyperglycemia and hypoglycemia  - Administer ordered medications to m medications  - Provide nonpharmacologic comfort measures as appropriate  - Advance diet as tolerated, if ordered  - Obtain nutritional consult as needed  - Evaluate fluid balance  Outcome: Adequate for Discharge     Problem: METABOLIC/FLUID AND ELECTROLYTE

## 2020-07-22 NOTE — PLAN OF CARE
Patient A/O x 4. VSS. Remote tele in place - HR goes up to 140s when ambulating. Cardiology already consulted. Plan for cardiac echo today for tachycardia. Tolerating general diet. Accucheck ACHS. Denies pain, n/v. Voiding freely.  Right chest port infusing patient and family perspectives and choices   Outcome: Progressing     Problem: GASTROINTESTINAL - ADULT  Goal: Minimal or absence of nausea and vomiting  Description  INTERVENTIONS:  - Maintain adequate hydration with IV or PO as ordered and tolerated  -

## 2020-07-22 NOTE — PROGRESS NOTES
[facility]    Assessment and Plan:     1. Colon cancer  2. Chemotherapy induced nausea and vomitting  3.  Tachycardia  - sinus  - multifactorial: malnutrition, dehydration, deconditioning     PLAN:    - echo pending  - hydration  - supin changes have occurred Electronically signed on 07/21/2020 at 14:21 by Darrick Rodrigues

## 2020-07-27 ENCOUNTER — OFFICE VISIT (OUTPATIENT)
Dept: HEMATOLOGY/ONCOLOGY | Facility: HOSPITAL | Age: 50
End: 2020-07-27
Attending: INTERNAL MEDICINE
Payer: COMMERCIAL

## 2020-07-27 VITALS
SYSTOLIC BLOOD PRESSURE: 127 MMHG | HEART RATE: 96 BPM | OXYGEN SATURATION: 97 % | DIASTOLIC BLOOD PRESSURE: 73 MMHG | WEIGHT: 124 LBS | HEIGHT: 56 IN | TEMPERATURE: 97 F | BODY MASS INDEX: 27.9 KG/M2 | RESPIRATION RATE: 16 BRPM

## 2020-07-27 VITALS
DIASTOLIC BLOOD PRESSURE: 73 MMHG | OXYGEN SATURATION: 97 % | SYSTOLIC BLOOD PRESSURE: 127 MMHG | HEART RATE: 96 BPM | RESPIRATION RATE: 16 BRPM | WEIGHT: 124.13 LBS | HEIGHT: 56 IN | BODY MASS INDEX: 27.92 KG/M2 | TEMPERATURE: 97 F

## 2020-07-27 DIAGNOSIS — D50.0 IRON DEFICIENCY ANEMIA DUE TO CHRONIC BLOOD LOSS: ICD-10-CM

## 2020-07-27 DIAGNOSIS — T45.1X5A CHEMOTHERAPY INDUCED NAUSEA AND VOMITING: ICD-10-CM

## 2020-07-27 DIAGNOSIS — E86.1 HYPOVOLEMIA: ICD-10-CM

## 2020-07-27 DIAGNOSIS — R11.12 PROJECTILE VOMITING WITH NAUSEA: ICD-10-CM

## 2020-07-27 DIAGNOSIS — E87.6 HYPOKALEMIA: ICD-10-CM

## 2020-07-27 DIAGNOSIS — Z51.11 CHEMOTHERAPY MANAGEMENT, ENCOUNTER FOR: ICD-10-CM

## 2020-07-27 DIAGNOSIS — T45.1X5A ANTINEOPLASTIC CHEMOTHERAPY INDUCED ANEMIA: ICD-10-CM

## 2020-07-27 DIAGNOSIS — C18.9 MALIGNANT NEOPLASM OF COLON, UNSPECIFIED PART OF COLON (HCC): Primary | ICD-10-CM

## 2020-07-27 DIAGNOSIS — C18.2 MALIGNANT NEOPLASM OF ASCENDING COLON (HCC): Primary | ICD-10-CM

## 2020-07-27 DIAGNOSIS — R11.2 CHEMOTHERAPY INDUCED NAUSEA AND VOMITING: ICD-10-CM

## 2020-07-27 DIAGNOSIS — D64.81 ANTINEOPLASTIC CHEMOTHERAPY INDUCED ANEMIA: ICD-10-CM

## 2020-07-27 LAB
ALBUMIN SERPL-MCNC: 3 G/DL (ref 3.4–5)
ALBUMIN/GLOB SERPL: 0.8 {RATIO} (ref 1–2)
ALP LIVER SERPL-CCNC: 153 U/L (ref 39–100)
ALT SERPL-CCNC: 34 U/L (ref 13–56)
ANION GAP SERPL CALC-SCNC: 3 MMOL/L (ref 0–18)
AST SERPL-CCNC: 29 U/L (ref 15–37)
BASOPHILS # BLD AUTO: 0.03 X10(3) UL (ref 0–0.2)
BASOPHILS NFR BLD AUTO: 0.8 %
BILIRUB SERPL-MCNC: 0.2 MG/DL (ref 0.1–2)
BUN BLD-MCNC: 5 MG/DL (ref 7–18)
BUN/CREAT SERPL: 8.1 (ref 10–20)
CALCIUM BLD-MCNC: 8.3 MG/DL (ref 8.5–10.1)
CHLORIDE SERPL-SCNC: 109 MMOL/L (ref 98–112)
CO2 SERPL-SCNC: 28 MMOL/L (ref 21–32)
CREAT BLD-MCNC: 0.62 MG/DL (ref 0.55–1.02)
DEPRECATED RDW RBC AUTO: 51.8 FL (ref 35.1–46.3)
EOSINOPHIL # BLD AUTO: 0.19 X10(3) UL (ref 0–0.7)
EOSINOPHIL NFR BLD AUTO: 4.8 %
ERYTHROCYTE [DISTWIDTH] IN BLOOD BY AUTOMATED COUNT: 17.8 % (ref 11–15)
GLOBULIN PLAS-MCNC: 3.8 G/DL (ref 2.8–4.4)
GLUCOSE BLD-MCNC: 223 MG/DL (ref 70–99)
HCT VFR BLD AUTO: 32.9 % (ref 35–48)
HGB BLD-MCNC: 10.6 G/DL (ref 12–16)
IMM GRANULOCYTES # BLD AUTO: 0 X10(3) UL (ref 0–1)
IMM GRANULOCYTES NFR BLD: 0 %
LYMPHOCYTES # BLD AUTO: 1.6 X10(3) UL (ref 1–4)
LYMPHOCYTES NFR BLD AUTO: 40.6 %
M PROTEIN MFR SERPL ELPH: 6.8 G/DL (ref 6.4–8.2)
MCH RBC QN AUTO: 25.8 PG (ref 26–34)
MCHC RBC AUTO-ENTMCNC: 32.2 G/DL (ref 31–37)
MCV RBC AUTO: 80 FL (ref 80–100)
MONOCYTES # BLD AUTO: 0.36 X10(3) UL (ref 0.1–1)
MONOCYTES NFR BLD AUTO: 9.1 %
NEUTROPHILS # BLD AUTO: 1.76 X10 (3) UL (ref 1.5–7.7)
NEUTROPHILS # BLD AUTO: 1.76 X10(3) UL (ref 1.5–7.7)
NEUTROPHILS NFR BLD AUTO: 44.7 %
OSMOLALITY SERPL CALC.SUM OF ELEC: 294 MOSM/KG (ref 275–295)
PLATELET # BLD AUTO: 367 10(3)UL (ref 150–450)
POTASSIUM SERPL-SCNC: 4.1 MMOL/L (ref 3.5–5.1)
RBC # BLD AUTO: 4.11 X10(6)UL (ref 3.8–5.3)
SODIUM SERPL-SCNC: 140 MMOL/L (ref 136–145)
WBC # BLD AUTO: 3.9 X10(3) UL (ref 4–11)

## 2020-07-27 PROCEDURE — 96415 CHEMO IV INFUSION ADDL HR: CPT

## 2020-07-27 PROCEDURE — 96375 TX/PRO/DX INJ NEW DRUG ADDON: CPT

## 2020-07-27 PROCEDURE — 96411 CHEMO IV PUSH ADDL DRUG: CPT

## 2020-07-27 PROCEDURE — 96413 CHEMO IV INFUSION 1 HR: CPT

## 2020-07-27 PROCEDURE — 99215 OFFICE O/P EST HI 40 MIN: CPT | Performed by: INTERNAL MEDICINE

## 2020-07-27 PROCEDURE — 96367 TX/PROPH/DG ADDL SEQ IV INF: CPT

## 2020-07-27 PROCEDURE — 85025 COMPLETE CBC W/AUTO DIFF WBC: CPT

## 2020-07-27 PROCEDURE — 80053 COMPREHEN METABOLIC PANEL: CPT

## 2020-07-27 PROCEDURE — 96368 THER/DIAG CONCURRENT INF: CPT

## 2020-07-27 RX ORDER — HEPARIN SODIUM (PORCINE) LOCK FLUSH IV SOLN 100 UNIT/ML 100 UNIT/ML
5 SOLUTION INTRAVENOUS ONCE
Status: CANCELLED | OUTPATIENT
Start: 2020-07-27

## 2020-07-27 RX ORDER — FLUOROURACIL 50 MG/ML
400 INJECTION, SOLUTION INTRAVENOUS ONCE
Status: CANCELLED | OUTPATIENT
Start: 2020-07-27

## 2020-07-27 RX ORDER — 0.9 % SODIUM CHLORIDE 0.9 %
10 VIAL (ML) INJECTION ONCE
Status: CANCELLED | OUTPATIENT
Start: 2020-07-27

## 2020-07-27 RX ORDER — 0.9 % SODIUM CHLORIDE 0.9 %
VIAL (ML) INJECTION
Status: DISCONTINUED
Start: 2020-07-27 | End: 2020-07-27

## 2020-07-27 RX ORDER — HEPARIN SODIUM (PORCINE) LOCK FLUSH IV SOLN 100 UNIT/ML 100 UNIT/ML
5 SOLUTION INTRAVENOUS ONCE
Status: DISCONTINUED | OUTPATIENT
Start: 2020-07-27 | End: 2020-07-27

## 2020-07-27 RX ORDER — FLUOROURACIL 50 MG/ML
2400 INJECTION, SOLUTION INTRAVENOUS CONTINUOUS
Status: CANCELLED | OUTPATIENT
Start: 2020-07-27

## 2020-07-27 RX ORDER — FLUOROURACIL 50 MG/ML
2400 INJECTION, SOLUTION INTRAVENOUS CONTINUOUS
Status: DISCONTINUED | OUTPATIENT
Start: 2020-07-27 | End: 2020-07-27

## 2020-07-27 RX ORDER — FLUOROURACIL 50 MG/ML
400 INJECTION, SOLUTION INTRAVENOUS ONCE
Status: COMPLETED | OUTPATIENT
Start: 2020-07-27 | End: 2020-07-27

## 2020-07-27 RX ADMIN — FLUOROURACIL 3450 MG: 50 INJECTION, SOLUTION INTRAVENOUS at 15:32:00

## 2020-07-27 RX ADMIN — FLUOROURACIL 550 MG: 50 INJECTION, SOLUTION INTRAVENOUS at 15:25:00

## 2020-07-27 NOTE — PROGRESS NOTES
Andria to infusion today for C2 D1 FOLFOX. Arrives Ambulating independently, accompanied by Self from MD exam. She is feeling well today but was hospitalized after her first treatment.  She was extremely nauseated, was unable to eat, having frequent emesis values  Expected outcomes:  able to maintain oral integrity  free of infection  maintains/gains weight  optimal lab values  understands plan of care  Progress towards outcome:  unchanged    Education Record    Learner:  Patient  Barriers / Limitations:  No

## 2020-07-27 NOTE — PROGRESS NOTES
Cancer Center Progress Note    Patient Name: Marcia Garcia   YOB: 1970   Medical Record Number: [id number]   Attending Physician: Edinson Barclay M.D.      Chief Complaint:  Colon cancer    Oncology History:  Micaela Canada is a 47year old female Disp: 30 packet, Rfl: 0  •  ondansetron 8 MG Oral Tablet Dispersible, Take 1 tablet (8 mg total) by mouth every 8 (eight) hours as needed for Nausea (chemo-induced nausea and vomiting). , Disp: 30 tablet, Rfl: 2  •  Naloxone HCl 4 MG/0.1ML Nasal Liquid, 4 m hemolysis. Test reordered by laboratory. ALT 34 07/27/2020    BILT 0.2 07/27/2020    TP 6.8 07/27/2020    ALB 3.0 (L) 07/27/2020    GLOBULIN 3.8 07/27/2020     07/27/2020    K  07/27/2020      Comment:      Test not reported due to hemolysis.

## 2020-07-29 ENCOUNTER — NURSE ONLY (OUTPATIENT)
Dept: HEMATOLOGY/ONCOLOGY | Facility: HOSPITAL | Age: 50
End: 2020-07-29
Attending: INTERNAL MEDICINE
Payer: COMMERCIAL

## 2020-07-29 VITALS
OXYGEN SATURATION: 100 % | RESPIRATION RATE: 16 BRPM | HEART RATE: 84 BPM | DIASTOLIC BLOOD PRESSURE: 76 MMHG | TEMPERATURE: 98 F | SYSTOLIC BLOOD PRESSURE: 147 MMHG

## 2020-07-29 DIAGNOSIS — R11.12 PROJECTILE VOMITING WITH NAUSEA: ICD-10-CM

## 2020-07-29 DIAGNOSIS — E86.1 HYPOVOLEMIA: ICD-10-CM

## 2020-07-29 DIAGNOSIS — E87.6 HYPOKALEMIA: Primary | ICD-10-CM

## 2020-07-29 DIAGNOSIS — D50.0 IRON DEFICIENCY ANEMIA DUE TO CHRONIC BLOOD LOSS: ICD-10-CM

## 2020-07-29 PROCEDURE — 96523 IRRIG DRUG DELIVERY DEVICE: CPT

## 2020-07-29 RX ORDER — HEPARIN SODIUM (PORCINE) LOCK FLUSH IV SOLN 100 UNIT/ML 100 UNIT/ML
5 SOLUTION INTRAVENOUS ONCE
Status: CANCELLED | OUTPATIENT
Start: 2020-07-29

## 2020-07-29 RX ORDER — 0.9 % SODIUM CHLORIDE 0.9 %
10 VIAL (ML) INJECTION ONCE
Status: DISCONTINUED | OUTPATIENT
Start: 2020-07-29 | End: 2020-07-29

## 2020-07-29 RX ORDER — HEPARIN SODIUM (PORCINE) LOCK FLUSH IV SOLN 100 UNIT/ML 100 UNIT/ML
5 SOLUTION INTRAVENOUS ONCE
Status: COMPLETED | OUTPATIENT
Start: 2020-07-29 | End: 2020-07-29

## 2020-07-29 RX ORDER — 0.9 % SODIUM CHLORIDE 0.9 %
10 VIAL (ML) INJECTION ONCE
Status: CANCELLED | OUTPATIENT
Start: 2020-07-29

## 2020-07-29 RX ADMIN — HEPARIN SODIUM (PORCINE) LOCK FLUSH IV SOLN 100 UNIT/ML 500 UNITS: 100 SOLUTION INTRAVENOUS at 14:08:00

## 2020-08-03 ENCOUNTER — TELEPHONE (OUTPATIENT)
Dept: HEMATOLOGY/ONCOLOGY | Facility: HOSPITAL | Age: 50
End: 2020-08-03

## 2020-08-03 NOTE — TELEPHONE ENCOUNTER
[facility]  She is doing much better than last time. A little more fatigued but no nausea or vomiting this time.   I let her know that I was very happy to hear that she did better this time and that she is going to come back for anothe

## 2020-08-10 ENCOUNTER — OFFICE VISIT (OUTPATIENT)
Dept: HEMATOLOGY/ONCOLOGY | Facility: HOSPITAL | Age: 50
End: 2020-08-10
Attending: INTERNAL MEDICINE
Payer: COMMERCIAL

## 2020-08-10 VITALS
DIASTOLIC BLOOD PRESSURE: 83 MMHG | WEIGHT: 125 LBS | RESPIRATION RATE: 16 BRPM | HEART RATE: 85 BPM | TEMPERATURE: 97 F | SYSTOLIC BLOOD PRESSURE: 149 MMHG | OXYGEN SATURATION: 100 % | BODY MASS INDEX: 28 KG/M2

## 2020-08-10 VITALS
WEIGHT: 125 LBS | DIASTOLIC BLOOD PRESSURE: 83 MMHG | OXYGEN SATURATION: 100 % | TEMPERATURE: 97 F | RESPIRATION RATE: 16 BRPM | HEIGHT: 56 IN | SYSTOLIC BLOOD PRESSURE: 149 MMHG | BODY MASS INDEX: 28.12 KG/M2 | HEART RATE: 85 BPM

## 2020-08-10 DIAGNOSIS — Z51.11 CHEMOTHERAPY MANAGEMENT, ENCOUNTER FOR: ICD-10-CM

## 2020-08-10 DIAGNOSIS — C18.2 MALIGNANT NEOPLASM OF ASCENDING COLON (HCC): Primary | ICD-10-CM

## 2020-08-10 DIAGNOSIS — G60.8 COLD INDUCED NEUROPATHY: ICD-10-CM

## 2020-08-10 DIAGNOSIS — E11.9 TYPE 2 DIABETES MELLITUS WITHOUT COMPLICATION, WITHOUT LONG-TERM CURRENT USE OF INSULIN (HCC): ICD-10-CM

## 2020-08-10 LAB
ALBUMIN SERPL-MCNC: 2.9 G/DL (ref 3.4–5)
ALBUMIN/GLOB SERPL: 0.7 {RATIO} (ref 1–2)
ALP LIVER SERPL-CCNC: 228 U/L (ref 39–100)
ALT SERPL-CCNC: 79 U/L (ref 13–56)
ANION GAP SERPL CALC-SCNC: 6 MMOL/L (ref 0–18)
AST SERPL-CCNC: 68 U/L (ref 15–37)
BASOPHILS # BLD AUTO: 0.02 X10(3) UL (ref 0–0.2)
BASOPHILS NFR BLD AUTO: 0.5 %
BILIRUB SERPL-MCNC: 0.3 MG/DL (ref 0.1–2)
BUN BLD-MCNC: 9 MG/DL (ref 7–18)
BUN/CREAT SERPL: 12.7 (ref 10–20)
CALCIUM BLD-MCNC: 8.3 MG/DL (ref 8.5–10.1)
CHLORIDE SERPL-SCNC: 105 MMOL/L (ref 98–112)
CO2 SERPL-SCNC: 26 MMOL/L (ref 21–32)
CREAT BLD-MCNC: 0.71 MG/DL (ref 0.55–1.02)
DEPRECATED RDW RBC AUTO: 51.9 FL (ref 35.1–46.3)
EOSINOPHIL # BLD AUTO: 0.15 X10(3) UL (ref 0–0.7)
EOSINOPHIL NFR BLD AUTO: 4.1 %
ERYTHROCYTE [DISTWIDTH] IN BLOOD BY AUTOMATED COUNT: 18.1 % (ref 11–15)
GLOBULIN PLAS-MCNC: 4 G/DL (ref 2.8–4.4)
GLUCOSE BLD-MCNC: 317 MG/DL (ref 70–99)
HCT VFR BLD AUTO: 34.4 % (ref 35–48)
HGB BLD-MCNC: 11.2 G/DL (ref 12–16)
IMM GRANULOCYTES # BLD AUTO: 0 X10(3) UL (ref 0–1)
IMM GRANULOCYTES NFR BLD: 0 %
LYMPHOCYTES # BLD AUTO: 1.67 X10(3) UL (ref 1–4)
LYMPHOCYTES NFR BLD AUTO: 45.9 %
M PROTEIN MFR SERPL ELPH: 6.9 G/DL (ref 6.4–8.2)
MCH RBC QN AUTO: 26.3 PG (ref 26–34)
MCHC RBC AUTO-ENTMCNC: 32.6 G/DL (ref 31–37)
MCV RBC AUTO: 80.8 FL (ref 80–100)
MONOCYTES # BLD AUTO: 0.31 X10(3) UL (ref 0.1–1)
MONOCYTES NFR BLD AUTO: 8.5 %
NEUTROPHILS # BLD AUTO: 1.49 X10 (3) UL (ref 1.5–7.7)
NEUTROPHILS # BLD AUTO: 1.49 X10(3) UL (ref 1.5–7.7)
NEUTROPHILS NFR BLD AUTO: 41 %
OSMOLALITY SERPL CALC.SUM OF ELEC: 295 MOSM/KG (ref 275–295)
PLATELET # BLD AUTO: 235 10(3)UL (ref 150–450)
POTASSIUM SERPL-SCNC: 3.8 MMOL/L (ref 3.5–5.1)
RBC # BLD AUTO: 4.26 X10(6)UL (ref 3.8–5.3)
SODIUM SERPL-SCNC: 137 MMOL/L (ref 136–145)
WBC # BLD AUTO: 3.6 X10(3) UL (ref 4–11)

## 2020-08-10 PROCEDURE — 96415 CHEMO IV INFUSION ADDL HR: CPT

## 2020-08-10 PROCEDURE — 80053 COMPREHEN METABOLIC PANEL: CPT

## 2020-08-10 PROCEDURE — 96367 TX/PROPH/DG ADDL SEQ IV INF: CPT

## 2020-08-10 PROCEDURE — 85025 COMPLETE CBC W/AUTO DIFF WBC: CPT

## 2020-08-10 PROCEDURE — 96368 THER/DIAG CONCURRENT INF: CPT

## 2020-08-10 PROCEDURE — 96411 CHEMO IV PUSH ADDL DRUG: CPT

## 2020-08-10 PROCEDURE — 99215 OFFICE O/P EST HI 40 MIN: CPT | Performed by: PHYSICIAN ASSISTANT

## 2020-08-10 PROCEDURE — 96413 CHEMO IV INFUSION 1 HR: CPT

## 2020-08-10 PROCEDURE — 96375 TX/PRO/DX INJ NEW DRUG ADDON: CPT

## 2020-08-10 RX ORDER — 0.9 % SODIUM CHLORIDE 0.9 %
VIAL (ML) INJECTION
Status: DISCONTINUED
Start: 2020-08-10 | End: 2020-08-10

## 2020-08-10 RX ORDER — FLUOROURACIL 50 MG/ML
400 INJECTION, SOLUTION INTRAVENOUS ONCE
Status: COMPLETED | OUTPATIENT
Start: 2020-08-10 | End: 2020-08-10

## 2020-08-10 RX ORDER — FLUOROURACIL 50 MG/ML
400 INJECTION, SOLUTION INTRAVENOUS ONCE
Status: CANCELLED | OUTPATIENT
Start: 2020-08-10

## 2020-08-10 RX ORDER — FLUOROURACIL 50 MG/ML
2400 INJECTION, SOLUTION INTRAVENOUS CONTINUOUS
Status: CANCELLED | OUTPATIENT
Start: 2020-08-10

## 2020-08-10 RX ORDER — FLUOROURACIL 50 MG/ML
2400 INJECTION, SOLUTION INTRAVENOUS CONTINUOUS
Status: DISCONTINUED | OUTPATIENT
Start: 2020-08-10 | End: 2020-08-10

## 2020-08-10 RX ADMIN — FLUOROURACIL 550 MG: 50 INJECTION, SOLUTION INTRAVENOUS at 14:35:00

## 2020-08-10 RX ADMIN — FLUOROURACIL 3450 MG: 50 INJECTION, SOLUTION INTRAVENOUS at 14:44:00

## 2020-08-10 NOTE — PATIENT INSTRUCTIONS
Take 1 tablet metformin 500 mg when you get home today 8/10/2020 then one tablet before going to bed to get 2 doses in today per Nathan Zamarripa. Take sitagliptin too today if you missed it also. Take all prescription medications while on treatment.  For

## 2020-08-10 NOTE — PROGRESS NOTES
Cancer Center Progress Note    Patient Name: Kely Genao   YOB: 1970   Medical Record Number: [id number]   Attending Physician: Javed Bond M.D.      Chief Complaint:  Colon cancer    Oncology History:  Eitan Qureshi is a 47year old female Cancer Maternal Cousin Female          unknown primary (dtr of aunt w/colon ca)   • Cancer Maternal Cousin Female 61        stomach ca (dtr of aunt w/colon ca)       Social History:  Unchanged from consult note    Current Medications:    Current Outpatient MA4E  Psych: appropriate mood and affect          Laboratory assessed and reviewed:  Lab Results   Component Value Date     (H) 08/10/2020    BUN 9 08/10/2020    BUNCREA 12.7 08/10/2020    CREATSERUM 0.71 08/10/2020    ANIONGAP 6 08/10/2020    GFRNA control nausea/vomiting.     MMR loss  Family history of GI malignancies. Genetics pending    DMII  -Did not take her hyperglycemic medications. Encouraged to take glucophage 500 mg once home this afternoon and another tablet before bedtime.   Also take

## 2020-08-10 NOTE — PROGRESS NOTES
Andria to infusion today for C3 D1 FOLFOX. Arrives Ambulating independently, accompanied by Self from MD exam. She is feeling well today but reports COVID exposure 8/1.     Patient reports possible pregnancy since last therapy cycle: No     Modifications i

## 2020-08-11 NOTE — CDS QUERY
Potential for Impaired Nutritional Status  DOCUMENTATION CLARIFICATION FORM  Dear Doctor:  Clinical information suggests potential for impaired nutritional status.  For accurate ICD-10-CM code assignment to reflect severity of illness and risk of mortality, adequate PO intake     - Medical Food Supplements-RD added ensure enlive bid (discussed change to glucerna if DM poorly controlled. Uses [facility] version of ensure at breakfast at home.  Rational/use of oral supplements discussed.     - Vitamin and mineral Tartrate  25 mg Oral 2x Daily(Beta Blocker)   • metRONIDAZOLE  500 mg Oral Q8H [address]   • Insulin Aspart Pen  1-5 Units Subcutaneous TID CC   • famotidine  20 mg Oral BID   • cetirizine  10 mg Oral Daily   • enoxaparin  40 mg Subcutaneous Daily   • Metoclopra

## 2020-08-12 ENCOUNTER — NURSE ONLY (OUTPATIENT)
Dept: HEMATOLOGY/ONCOLOGY | Facility: HOSPITAL | Age: 50
End: 2020-08-12
Attending: INTERNAL MEDICINE
Payer: COMMERCIAL

## 2020-08-12 DIAGNOSIS — D50.0 IRON DEFICIENCY ANEMIA DUE TO CHRONIC BLOOD LOSS: ICD-10-CM

## 2020-08-12 DIAGNOSIS — R11.12 PROJECTILE VOMITING WITH NAUSEA: ICD-10-CM

## 2020-08-12 DIAGNOSIS — E86.1 HYPOVOLEMIA: ICD-10-CM

## 2020-08-12 DIAGNOSIS — E87.6 HYPOKALEMIA: Primary | ICD-10-CM

## 2020-08-12 PROCEDURE — 96523 IRRIG DRUG DELIVERY DEVICE: CPT

## 2020-08-12 RX ORDER — 0.9 % SODIUM CHLORIDE 0.9 %
10 VIAL (ML) INJECTION ONCE
Status: CANCELLED | OUTPATIENT
Start: 2020-08-12

## 2020-08-12 RX ORDER — HEPARIN SODIUM (PORCINE) LOCK FLUSH IV SOLN 100 UNIT/ML 100 UNIT/ML
5 SOLUTION INTRAVENOUS ONCE
Status: CANCELLED | OUTPATIENT
Start: 2020-08-12

## 2020-08-12 RX ORDER — HEPARIN SODIUM (PORCINE) LOCK FLUSH IV SOLN 100 UNIT/ML 100 UNIT/ML
5 SOLUTION INTRAVENOUS ONCE
Status: COMPLETED | OUTPATIENT
Start: 2020-08-12 | End: 2020-08-12

## 2020-08-12 RX ADMIN — HEPARIN SODIUM (PORCINE) LOCK FLUSH IV SOLN 100 UNIT/ML 500 UNITS: 100 SOLUTION INTRAVENOUS at 13:10:00

## 2020-08-16 ENCOUNTER — HOSPITAL ENCOUNTER (INPATIENT)
Facility: HOSPITAL | Age: 50
LOS: 2 days | Discharge: HOME | DRG: 392 | End: 2020-08-18
Attending: EMERGENCY MEDICINE | Admitting: INTERNAL MEDICINE
Payer: COMMERCIAL

## 2020-08-16 DIAGNOSIS — R11.2 CHEMOTHERAPY-INDUCED NAUSEA AND VOMITING: ICD-10-CM

## 2020-08-16 DIAGNOSIS — T45.1X5A CHEMOTHERAPY-INDUCED NAUSEA AND VOMITING: ICD-10-CM

## 2020-08-16 DIAGNOSIS — N30.00 ACUTE CYSTITIS WITHOUT HEMATURIA: ICD-10-CM

## 2020-08-16 DIAGNOSIS — E87.6 HYPOKALEMIA: Primary | ICD-10-CM

## 2020-08-16 LAB
ANION GAP SERPL CALC-SCNC: 10 MMOL/L (ref 0–18)
BASOPHILS # BLD AUTO: 0.03 X10(3) UL (ref 0–0.2)
BASOPHILS NFR BLD AUTO: 0.9 %
BILIRUB UR QL: NEGATIVE
BUN BLD-MCNC: 14 MG/DL (ref 7–18)
BUN/CREAT SERPL: 15.1 (ref 10–20)
CALCIUM BLD-MCNC: 9.1 MG/DL (ref 8.5–10.1)
CHLORIDE SERPL-SCNC: 98 MMOL/L (ref 98–112)
CO2 SERPL-SCNC: 29 MMOL/L (ref 21–32)
COLOR UR: YELLOW
CREAT BLD-MCNC: 0.93 MG/DL (ref 0.55–1.02)
DEPRECATED RDW RBC AUTO: 48.5 FL (ref 35.1–46.3)
EOSINOPHIL # BLD AUTO: 0.16 X10(3) UL (ref 0–0.7)
EOSINOPHIL NFR BLD AUTO: 4.7 %
ERYTHROCYTE [DISTWIDTH] IN BLOOD BY AUTOMATED COUNT: 17.4 % (ref 11–15)
GLUCOSE BLD-MCNC: 260 MG/DL (ref 70–99)
GLUCOSE BLDC GLUCOMTR-MCNC: 157 MG/DL (ref 70–99)
GLUCOSE BLDC GLUCOMTR-MCNC: 180 MG/DL (ref 70–99)
GLUCOSE UR-MCNC: 150 MG/DL
HAV IGM SER QL: 2.1 MG/DL (ref 1.6–2.6)
HCT VFR BLD AUTO: 41.9 % (ref 35–48)
HGB BLD-MCNC: 14.2 G/DL (ref 12–16)
HGB UR QL STRIP.AUTO: NEGATIVE
HYALINE CASTS #/AREA URNS AUTO: 6 /LPF
IMM GRANULOCYTES # BLD AUTO: 0.01 X10(3) UL (ref 0–1)
IMM GRANULOCYTES NFR BLD: 0.3 %
KETONES UR-MCNC: 20 MG/DL
LYMPHOCYTES # BLD AUTO: 1.96 X10(3) UL (ref 1–4)
LYMPHOCYTES NFR BLD AUTO: 58 %
MCH RBC QN AUTO: 26.6 PG (ref 26–34)
MCHC RBC AUTO-ENTMCNC: 33.9 G/DL (ref 31–37)
MCV RBC AUTO: 78.6 FL (ref 80–100)
MONOCYTES # BLD AUTO: 0.37 X10(3) UL (ref 0.1–1)
MONOCYTES NFR BLD AUTO: 10.9 %
NEUTROPHILS # BLD AUTO: 0.85 X10 (3) UL (ref 1.5–7.7)
NEUTROPHILS # BLD AUTO: 0.85 X10(3) UL (ref 1.5–7.7)
NEUTROPHILS NFR BLD AUTO: 25.2 %
NITRITE UR QL STRIP.AUTO: NEGATIVE
OSMOLALITY SERPL CALC.SUM OF ELEC: 293 MOSM/KG (ref 275–295)
PH UR: 7 [PH] (ref 5–8)
PLATELET # BLD AUTO: 280 10(3)UL (ref 150–450)
POTASSIUM SERPL-SCNC: 2.9 MMOL/L (ref 3.5–5.1)
PROT UR-MCNC: 30 MG/DL
RBC # BLD AUTO: 5.33 X10(6)UL (ref 3.8–5.3)
RBC #/AREA URNS AUTO: 1 /HPF
SARS-COV-2 RNA RESP QL NAA+PROBE: NOT DETECTED
SODIUM SERPL-SCNC: 137 MMOL/L (ref 136–145)
SP GR UR STRIP: 1.02 (ref 1–1.03)
UROBILINOGEN UR STRIP-ACNC: <2
WBC # BLD AUTO: 3.4 X10(3) UL (ref 4–11)
WBC #/AREA URNS AUTO: 12 /HPF

## 2020-08-16 PROCEDURE — 99253 IP/OBS CNSLTJ NEW/EST LOW 45: CPT | Performed by: INTERNAL MEDICINE

## 2020-08-16 RX ORDER — ONDANSETRON 2 MG/ML
4 INJECTION INTRAMUSCULAR; INTRAVENOUS EVERY 6 HOURS PRN
Status: DISCONTINUED | OUTPATIENT
Start: 2020-08-16 | End: 2020-08-18

## 2020-08-16 RX ORDER — ONDANSETRON 2 MG/ML
INJECTION INTRAMUSCULAR; INTRAVENOUS
Status: COMPLETED
Start: 2020-08-16 | End: 2020-08-16

## 2020-08-16 RX ORDER — ACETAMINOPHEN 500 MG
500 TABLET ORAL EVERY 6 HOURS PRN
COMMUNITY

## 2020-08-16 RX ORDER — DEXTROSE MONOHYDRATE 25 G/50ML
50 INJECTION, SOLUTION INTRAVENOUS
Status: DISCONTINUED | OUTPATIENT
Start: 2020-08-16 | End: 2020-08-18

## 2020-08-16 RX ORDER — ACETAMINOPHEN 325 MG/1
650 TABLET ORAL EVERY 6 HOURS PRN
Status: DISCONTINUED | OUTPATIENT
Start: 2020-08-16 | End: 2020-08-18

## 2020-08-16 RX ORDER — ONDANSETRON 2 MG/ML
4 INJECTION INTRAMUSCULAR; INTRAVENOUS ONCE
Status: COMPLETED | OUTPATIENT
Start: 2020-08-16 | End: 2020-08-16

## 2020-08-16 RX ORDER — ONDANSETRON 2 MG/ML
4 INJECTION INTRAMUSCULAR; INTRAVENOUS EVERY 6 HOURS PRN
Status: DISCONTINUED | OUTPATIENT
Start: 2020-08-16 | End: 2020-08-16

## 2020-08-16 RX ORDER — HEPARIN SODIUM 5000 [USP'U]/ML
5000 INJECTION, SOLUTION INTRAVENOUS; SUBCUTANEOUS EVERY 8 HOURS SCHEDULED
Status: DISCONTINUED | OUTPATIENT
Start: 2020-08-16 | End: 2020-08-18

## 2020-08-16 RX ORDER — POTASSIUM CHLORIDE 29.8 MG/ML
40 INJECTION INTRAVENOUS ONCE
Status: COMPLETED | OUTPATIENT
Start: 2020-08-16 | End: 2020-08-16

## 2020-08-16 RX ORDER — MORPHINE SULFATE 2 MG/ML
1 INJECTION, SOLUTION INTRAMUSCULAR; INTRAVENOUS EVERY 4 HOURS PRN
Status: DISCONTINUED | OUTPATIENT
Start: 2020-08-16 | End: 2020-08-18

## 2020-08-16 NOTE — H&P
[address] Patient Status:  Inpatient    1970 MRN [id number]   Location [facility] 4W/SW/SE Attending Brett Aase, MD   Hosp Day # 0 PCP Alcario Hampton     Date:  2020  Date of Ad Take 500 mg by mouth every 6 (six) hours as needed for Pain., Disp: , Rfl:   metoprolol Tartrate 25 MG Oral Tab, Take 1 tablet (25 mg total) by mouth 2x Daily(Beta Blocker). , Disp: 30 tablet, Rfl: 0, Past Week at Unknown time  ondansetron 8 MG Oral Tablet PERRLA  Neck: Supple, no carotid bruit or JVD  Lungs: Clear to auscultation bilaterally, No wheezes, crackles  Heart: Regular rate and rhythm, S1 and S2 normal, no murmur, rub or gallop  Abdomen: Soft, non-tender, non distended, Bs+, no organomegaly   Extr

## 2020-08-16 NOTE — ED PROVIDER NOTES
Patient Seen in: [facility] Emergency Department      History   Patient presents with:  Dizziness  Nausea/Vomiting/Diarrhea    Stated Complaint: vomiting/ not eating/ Weakness/ RX Chemo    HPI    48-year-old female presents for complaint of nausea 110/78   Pulse (!) 125   Resp 18   Temp 97.9 °F (36.6 °C)   Temp src Oral   SpO2 99 %   O2 Device None (Room air)       Current:/77   Pulse 98   Temp 97.9 °F (36.6 °C) (Oral)   Resp 20   Wt 57.2 kg   SpO2 98%   BMI 28.25 kg/m²         Physical Exam Notable for the following components:    Glucose 260 (*)     Potassium 2.9 (*)     All other components within normal limits   CBC W/ DIFFERENTIAL - Abnormal; Notable for the following components:    WBC 3.4 (*)     RBC 5.33 (*)     MCV 78.6 (*)     RDW-SD Impression:  Hypokalemia  (primary encounter diagnosis)  Acute cystitis without hematuria  Chemotherapy-induced nausea and vomiting    Disposition:  Admit  8/16/2020  4:37 pm    Follow-up:  No follow-up provider specified.         Medications Prescribed:  C

## 2020-08-16 NOTE — ED INITIAL ASSESSMENT (HPI)
Patient aox3 to ed via private vehicle patient co of vomiting and weakness x 5 days, per family member stated patient started feeling this way after recent chemo treatment (8/10/20). Patient denies pain.  Patient hx of colon ca

## 2020-08-16 NOTE — PROGRESS NOTES
Patient received from ED. VSS. Denies pain, some nausea but denied need for intervention. Up independently. Potassium IV infusing, started ceftriaxone. Accuchecks. Call light within reach, using appropriately.

## 2020-08-16 NOTE — ED NOTES
Orders for admission, patient is aware of plan and ready to go upstairs. Any questions, please call Chaz YORK. Just hang iv rocephin after potassium iv is done.

## 2020-08-17 LAB
ANION GAP SERPL CALC-SCNC: 6 MMOL/L (ref 0–18)
BASOPHILS # BLD AUTO: 0.03 X10(3) UL (ref 0–0.2)
BASOPHILS NFR BLD AUTO: 0.7 %
BUN BLD-MCNC: 8 MG/DL (ref 7–18)
BUN/CREAT SERPL: 13.1 (ref 10–20)
CALCIUM BLD-MCNC: 8.4 MG/DL (ref 8.5–10.1)
CHLORIDE SERPL-SCNC: 106 MMOL/L (ref 98–112)
CO2 SERPL-SCNC: 28 MMOL/L (ref 21–32)
CREAT BLD-MCNC: 0.61 MG/DL (ref 0.55–1.02)
DEPRECATED RDW RBC AUTO: 50.4 FL (ref 35.1–46.3)
EOSINOPHIL # BLD AUTO: 0.33 X10(3) UL (ref 0–0.7)
EOSINOPHIL NFR BLD AUTO: 7.3 %
ERYTHROCYTE [DISTWIDTH] IN BLOOD BY AUTOMATED COUNT: 17.3 % (ref 11–15)
GLUCOSE BLD-MCNC: 155 MG/DL (ref 70–99)
GLUCOSE BLDC GLUCOMTR-MCNC: 139 MG/DL (ref 70–99)
GLUCOSE BLDC GLUCOMTR-MCNC: 163 MG/DL (ref 70–99)
GLUCOSE BLDC GLUCOMTR-MCNC: 177 MG/DL (ref 70–99)
GLUCOSE BLDC GLUCOMTR-MCNC: 206 MG/DL (ref 70–99)
GLUCOSE BLDC GLUCOMTR-MCNC: 256 MG/DL (ref 70–99)
HCT VFR BLD AUTO: 36.8 % (ref 35–48)
HGB BLD-MCNC: 12.3 G/DL (ref 12–16)
IMM GRANULOCYTES # BLD AUTO: 0.04 X10(3) UL (ref 0–1)
IMM GRANULOCYTES NFR BLD: 0.9 %
LYMPHOCYTES # BLD AUTO: 2.66 X10(3) UL (ref 1–4)
LYMPHOCYTES NFR BLD AUTO: 59 %
MCH RBC QN AUTO: 26.9 PG (ref 26–34)
MCHC RBC AUTO-ENTMCNC: 33.4 G/DL (ref 31–37)
MCV RBC AUTO: 80.3 FL (ref 80–100)
MONOCYTES # BLD AUTO: 0.4 X10(3) UL (ref 0.1–1)
MONOCYTES NFR BLD AUTO: 8.9 %
NEUTROPHILS # BLD AUTO: 1.05 X10 (3) UL (ref 1.5–7.7)
NEUTROPHILS # BLD AUTO: 1.05 X10(3) UL (ref 1.5–7.7)
NEUTROPHILS NFR BLD AUTO: 23.2 %
OSMOLALITY SERPL CALC.SUM OF ELEC: 291 MOSM/KG (ref 275–295)
PLATELET # BLD AUTO: 246 10(3)UL (ref 150–450)
POTASSIUM SERPL-SCNC: 3.4 MMOL/L (ref 3.5–5.1)
RBC # BLD AUTO: 4.58 X10(6)UL (ref 3.8–5.3)
SODIUM SERPL-SCNC: 140 MMOL/L (ref 136–145)
WBC # BLD AUTO: 4.5 X10(3) UL (ref 4–11)

## 2020-08-17 PROCEDURE — 99232 SBSQ HOSP IP/OBS MODERATE 35: CPT | Performed by: INTERNAL MEDICINE

## 2020-08-17 RX ORDER — POTASSIUM CHLORIDE 29.8 MG/ML
40 INJECTION INTRAVENOUS ONCE
Status: COMPLETED | OUTPATIENT
Start: 2020-08-17 | End: 2020-08-17

## 2020-08-17 NOTE — PLAN OF CARE
Problem: Patient Centered Care  Goal: Patient preferences are identified and integrated in the patient's plan of care  Description  Interventions:  - What would you like us to know as we care for you?  Home with family   - Provide timely, complete, and ac Evaluate fluid balance  Outcome: Progressing  Goal: Maintains or returns to baseline bowel function  Description  INTERVENTIONS:  - Assess bowel function  - Maintain adequate hydration with IV or PO as ordered and tolerated  - Evaluate effectiveness of GI

## 2020-08-17 NOTE — PROGRESS NOTES
[facility] - [facility]    Progress Note    Amilcar Beauchamp Patient Status:  Inpatient    1970 MRN [id number]   Location [facility] 4W/SW/SE Attending Nolan Blanco MD   Hosp Day # 1 PCP Enco Juares     SUBJECTIVE:  Pt seen and examined 50 % injection 50 mL, 50 mL, Intravenous, Q15 Min PRN    Or  glucose (DEX4) oral liquid 30 g, 30 g, Oral, Q15 Min PRN    Or  Glucose-Vitamin C (DEX-4) chewable tab 8 tablet, 8 tablet, Oral, Q15 Min PRN  Insulin Aspart Pen (NOVOLOG) 100 UNIT/ML flexpen 1-7

## 2020-08-18 VITALS
TEMPERATURE: 98 F | DIASTOLIC BLOOD PRESSURE: 78 MMHG | SYSTOLIC BLOOD PRESSURE: 136 MMHG | HEART RATE: 74 BPM | WEIGHT: 126 LBS | OXYGEN SATURATION: 100 % | BODY MASS INDEX: 28 KG/M2 | RESPIRATION RATE: 18 BRPM

## 2020-08-18 LAB
GLUCOSE BLDC GLUCOMTR-MCNC: 156 MG/DL (ref 70–99)
GLUCOSE BLDC GLUCOMTR-MCNC: 191 MG/DL (ref 70–99)
POTASSIUM SERPL-SCNC: 3.7 MMOL/L (ref 3.5–5.1)

## 2020-08-18 RX ORDER — POTASSIUM CHLORIDE 20 MEQ/1
40 TABLET, EXTENDED RELEASE ORAL ONCE
Status: COMPLETED | OUTPATIENT
Start: 2020-08-18 | End: 2020-08-18

## 2020-08-18 NOTE — PAYOR COMM NOTE
--------------  ADMISSION REVIEW     Payor: Marcos Christie #:  [id number]  Authorization Number: [id number]    Admit date: 8/16/20  Admit time: [address]       Admitting Physician: Kisha Tatum MD  Attending Physician:  Kisha Tatum MD  Primary Care y All other systems reviewed and are negative. Positive for stated complaint: vomiting/ not eating/ Weakness/ RX Chemo  Other systems are as noted in HPI. Constitutional and vital signs reviewed.     All other systems reviewed and negative except as noted Result Value    Clarity Urine Hazy (*)     Glucose Urine 150  (*)     Ketones Urine 20  (*)     Protein Urine 30  (*)     Leukocyte Esterase Urine Moderate (*)     Hyaline Casts 6 (*)     WBC Urine 12 (*)     Bacteria Urine Few (*)     All other compon ICD-10-CM Noted POA    Hypokalemia E87.6 7/16/2020 Unknown     Rodolfo Cassidy MD on 8/16/2020  4:52 PM         H&P signed by Joseph Archer MD at 8/17/2020  1:08 PM     Author:  Joseph Archer MD Service:  Internal Medicine Author Type: BP (!) 134/98 (BP Location: Right arm)   Pulse 88   Temp 98.4 °F (36.9 °C) (Oral)   Resp 18   Wt 126 lb (57.2 kg)   SpO2 100%   BMI 28.25 kg/m²      General Appearance:  Alert, Awake, not in acute distress.   HEENT: PERRLA  Neck: Supple, no carotid bruit or Follow-up urine cultures  DVT/GI prophylaxis  Med rec done  POC discussed with nursing    Plan of care discussed in detail with patient  at bedside. All questions answered.   Mo Meléndez  8/16/2020  6:14 PM    Report of Hematology/Oncology Consultation  Im Hypokalemia:  --Secondary to the above. Improved.   --On IV potassium replacement per protocol.     MSI–H high risk stage II colon cancer:  --Status post 3 cycles of M FOLFOX 6  --Patient being treated as high-grade stage II with perforation, however benefi CefTRIAXone Sodium (ROCEPHIN) 1 g in sodium chloride 0.9% 100 mL MBP/ADD-vantage   Dose: 1 g  Freq:  Once Route: IV  Last Dose: Stopped (08/16/20 1653)  Start: 08/16/20 1632 End: 08/16/20 1653    Order specific questions:   What infection is this being used Last Dose: Stopped (08/16/20 1620)  Start: 08/16/20 1319 End: 08/16/20 1620    1319-New Bag   1620-Stopped                  REVIEWER COMMENTS:     FOR REVIEW/APPROVAL OF INPT ADMISSION

## 2020-08-18 NOTE — DISCHARGE SUMMARY
[facility]    Discharge Summary    Rodolfo Duran Patient Status:  Inpatient    1970 MRN [id number]   Location [facility] 4W/SW/SE Attending Otis Leslie MD   Hosp Day # 2 PCP Hafsa Banks     Date of Admission:  Stable         Discharge Medications:      Discharge Medications      CONTINUE taking these medications      Instructions Prescription details   acetaminophen 500 MG Tabs  Commonly known as:  TYLENOL EXTRA STRENGTH      Take 500 mg by mouth every 6 (six) h

## 2020-08-18 NOTE — PROGRESS NOTES
[facility]    Progress Note    Gal Pandey Patient Status:  Inpatient    1970 MRN [id number]   Location [facility] 4W/SW/SE Attending Krista Zhu MD   [facility] Day # 1 PCP Dany RUBIO        Subjective:   Subjective:  P dose of ceftriaxone at the ED.  --Urine cultures negative.   No futher antibiotics recommended.      Thank you for allowing me to participate in the care of your patient.        All questions answered to the best of my abilities.        Doris Nava M.D.

## 2020-08-18 NOTE — PLAN OF CARE
Patient is currently resting. Alert & oriented x4. Room air. Saline locked. Right port. Tolerating carb-controlled diet. Accu AC/HS. Voiding WNL. +BM overnight. Denies any pain. Heparin sq for DVT prophylaxis. Up independently. Safety plan in place. and tolerated  - Evaluate effectiveness of GI medications  - Encourage mobilization and activity  - Obtain nutritional consult as needed  - Establish a toileting routine/schedule  - Consider collaborating with pharmacy to review patient's medication profil

## 2020-08-18 NOTE — PLAN OF CARE
Pt alert and oriented x4. VSS. On room air. Tolerating diet. No nausea. Pt reports she feels much better today, no complaints of pain. Voiding. Ambulating freely. Potassium replaced. Cleared for discharge. Instructions given. Education given.    Problem: Pa suction as ordered  - Evaluate effectiveness of ordered antiemetic medications  - Provide nonpharmacologic comfort measures as appropriate  - Advance diet as tolerated, if ordered  - Obtain nutritional consult as needed  - Evaluate fluid balance  Outcome:

## 2020-08-24 ENCOUNTER — APPOINTMENT (OUTPATIENT)
Dept: HEMATOLOGY/ONCOLOGY | Facility: HOSPITAL | Age: 50
End: 2020-08-24
Attending: INTERNAL MEDICINE
Payer: COMMERCIAL

## 2020-08-24 ENCOUNTER — TELEPHONE (OUTPATIENT)
Dept: HEMATOLOGY/ONCOLOGY | Facility: HOSPITAL | Age: 50
End: 2020-08-24

## 2020-08-24 ENCOUNTER — TELEPHONE (OUTPATIENT)
Dept: GENETICS | Facility: HOSPITAL | Age: 50
End: 2020-08-24

## 2020-08-24 NOTE — TELEPHONE ENCOUNTER
Reported NEGATIVE germline genetic testing results to [place] over phone.  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

## 2020-08-24 NOTE — TELEPHONE ENCOUNTER
I spoke with Mario Garcia.  I let her know that she needs to call us if she is going to not come in for her appointments.   I told her that it is very important that we see her today or tomorrow since she was just released from the hospital.  I rescheduled her fo

## 2020-08-25 ENCOUNTER — OFFICE VISIT (OUTPATIENT)
Dept: HEMATOLOGY/ONCOLOGY | Facility: HOSPITAL | Age: 50
End: 2020-08-25
Attending: INTERNAL MEDICINE
Payer: COMMERCIAL

## 2020-08-25 VITALS
RESPIRATION RATE: 16 BRPM | HEART RATE: 82 BPM | SYSTOLIC BLOOD PRESSURE: 147 MMHG | TEMPERATURE: 98 F | WEIGHT: 125 LBS | DIASTOLIC BLOOD PRESSURE: 91 MMHG | HEIGHT: 56 IN | BODY MASS INDEX: 28.12 KG/M2 | OXYGEN SATURATION: 99 %

## 2020-08-25 DIAGNOSIS — R11.2 CHEMOTHERAPY INDUCED NAUSEA AND VOMITING: ICD-10-CM

## 2020-08-25 DIAGNOSIS — T45.1X5A CHEMOTHERAPY-INDUCED NEUROPATHY (HCC): ICD-10-CM

## 2020-08-25 DIAGNOSIS — E87.6 HYPOKALEMIA: ICD-10-CM

## 2020-08-25 DIAGNOSIS — R11.12 PROJECTILE VOMITING WITH NAUSEA: ICD-10-CM

## 2020-08-25 DIAGNOSIS — C18.2 MALIGNANT NEOPLASM OF ASCENDING COLON (HCC): ICD-10-CM

## 2020-08-25 DIAGNOSIS — G62.0 CHEMOTHERAPY-INDUCED NEUROPATHY (HCC): ICD-10-CM

## 2020-08-25 DIAGNOSIS — Z51.11 CHEMOTHERAPY MANAGEMENT, ENCOUNTER FOR: Primary | ICD-10-CM

## 2020-08-25 DIAGNOSIS — T45.1X5A CHEMOTHERAPY INDUCED NAUSEA AND VOMITING: ICD-10-CM

## 2020-08-25 LAB
ALBUMIN SERPL-MCNC: 3 G/DL (ref 3.4–5)
ALBUMIN/GLOB SERPL: 0.7 {RATIO} (ref 1–2)
ALP LIVER SERPL-CCNC: 176 U/L (ref 41–108)
ALT SERPL-CCNC: 58 U/L (ref 13–56)
ANION GAP SERPL CALC-SCNC: 6 MMOL/L (ref 0–18)
AST SERPL-CCNC: 43 U/L (ref 15–37)
BASOPHILS # BLD AUTO: 0.03 X10(3) UL (ref 0–0.2)
BASOPHILS NFR BLD AUTO: 0.9 %
BILIRUB SERPL-MCNC: 0.3 MG/DL (ref 0.1–2)
BUN BLD-MCNC: 6 MG/DL (ref 7–18)
BUN/CREAT SERPL: 8.8 (ref 10–20)
CALCIUM BLD-MCNC: 9 MG/DL (ref 8.5–10.1)
CHLORIDE SERPL-SCNC: 108 MMOL/L (ref 98–112)
CO2 SERPL-SCNC: 24 MMOL/L (ref 21–32)
CREAT BLD-MCNC: 0.68 MG/DL (ref 0.55–1.02)
DEPRECATED RDW RBC AUTO: 53.9 FL (ref 35.1–46.3)
EOSINOPHIL # BLD AUTO: 0.09 X10(3) UL (ref 0–0.7)
EOSINOPHIL NFR BLD AUTO: 2.7 %
ERYTHROCYTE [DISTWIDTH] IN BLOOD BY AUTOMATED COUNT: 18.2 % (ref 11–15)
GLOBULIN PLAS-MCNC: 4.2 G/DL (ref 2.8–4.4)
GLUCOSE BLD-MCNC: 316 MG/DL (ref 70–99)
HCT VFR BLD AUTO: 38 % (ref 35–48)
HGB BLD-MCNC: 12.6 G/DL (ref 12–16)
IMM GRANULOCYTES # BLD AUTO: 0.01 X10(3) UL (ref 0–1)
IMM GRANULOCYTES NFR BLD: 0.3 %
LYMPHOCYTES # BLD AUTO: 1.37 X10(3) UL (ref 1–4)
LYMPHOCYTES NFR BLD AUTO: 41.5 %
M PROTEIN MFR SERPL ELPH: 7.2 G/DL (ref 6.4–8.2)
MCH RBC QN AUTO: 27.3 PG (ref 26–34)
MCHC RBC AUTO-ENTMCNC: 33.2 G/DL (ref 31–37)
MCV RBC AUTO: 82.4 FL (ref 80–100)
MONOCYTES # BLD AUTO: 0.31 X10(3) UL (ref 0.1–1)
MONOCYTES NFR BLD AUTO: 9.4 %
NEUTROPHILS # BLD AUTO: 1.49 X10 (3) UL (ref 1.5–7.7)
NEUTROPHILS # BLD AUTO: 1.49 X10(3) UL (ref 1.5–7.7)
NEUTROPHILS NFR BLD AUTO: 45.2 %
OSMOLALITY SERPL CALC.SUM OF ELEC: 296 MOSM/KG (ref 275–295)
PLATELET # BLD AUTO: 199 10(3)UL (ref 150–450)
POTASSIUM SERPL-SCNC: 3.7 MMOL/L (ref 3.5–5.1)
RBC # BLD AUTO: 4.61 X10(6)UL (ref 3.8–5.3)
SODIUM SERPL-SCNC: 138 MMOL/L (ref 136–145)
WBC # BLD AUTO: 3.3 X10(3) UL (ref 4–11)

## 2020-08-25 PROCEDURE — 80053 COMPREHEN METABOLIC PANEL: CPT

## 2020-08-25 PROCEDURE — 85025 COMPLETE CBC W/AUTO DIFF WBC: CPT

## 2020-08-25 PROCEDURE — 99215 OFFICE O/P EST HI 40 MIN: CPT | Performed by: INTERNAL MEDICINE

## 2020-08-25 PROCEDURE — 36415 COLL VENOUS BLD VENIPUNCTURE: CPT

## 2020-08-25 RX ORDER — OLANZAPINE 10 MG/1
10 TABLET ORAL NIGHTLY
Qty: 5 TABLET | Refills: 0 | Status: SHIPPED | OUTPATIENT
Start: 2020-08-25 | End: 2020-08-30

## 2020-08-25 RX ORDER — FLUOROURACIL 50 MG/ML
2400 INJECTION, SOLUTION INTRAVENOUS CONTINUOUS
Status: CANCELLED | OUTPATIENT
Start: 2020-08-26

## 2020-08-25 RX ORDER — FLUOROURACIL 50 MG/ML
400 INJECTION, SOLUTION INTRAVENOUS ONCE
Status: CANCELLED | OUTPATIENT
Start: 2020-08-26

## 2020-08-25 NOTE — PATIENT INSTRUCTIONS
730AM chemo tomorrow Wed 8/26. START Olanzapine (Zyprexa) 10mg tablet-- take 1 tablet on Day 1 of chemo and continue daily for 5 days-- AT PHARMACY to start tomorrow Wed 8/26.   Prevent nausea/vomiting    Keep taking Ondansetron (Zofran) every 8

## 2020-08-25 NOTE — PROGRESS NOTES
Cancer Center Progress Note    Patient Name: Nicolasa Soto   YOB: 1970   Medical Record Number: [id number]     Chief Complaint:  Colon cancer    Oncology History:  Dylon Rehman is a 47year old female with high risk stage II (pT4aN0) adenoc Maternal Cousin Female          unknown primary (dtr of aunt w/colon ca)   • Cancer Maternal Cousin Female 61        stomach ca (dtr of aunt w/colon ca)       Social History:  Unchanged from consult note    Current Medications:    Current Outpatient Medica Component Value Date    WBC 3.3 (L) 08/25/2020    WBC 4.5 08/17/2020    WBC 3.4 (L) 08/16/2020     Lab Results   Component Value Date    HGB 12.6 08/25/2020    HGB 12.3 08/17/2020    HGB 14.2 08/16/2020      Lab Results   Component Value Date    . colon  Status post resection 6/5/2020. MSI-H  She started adjuvant FOLFOX July 2020. Proceed with Cycle #4 (delayed 1 week d/t discharge recovery), with dose reduction of Oxaliplatin to 65m2. Consider Oxaliplatin discontinuation with next dosing.        Celia Chery

## 2020-08-26 ENCOUNTER — APPOINTMENT (OUTPATIENT)
Dept: HEMATOLOGY/ONCOLOGY | Facility: HOSPITAL | Age: 50
End: 2020-08-26
Attending: INTERNAL MEDICINE
Payer: COMMERCIAL

## 2020-08-26 VITALS
SYSTOLIC BLOOD PRESSURE: 142 MMHG | RESPIRATION RATE: 16 BRPM | DIASTOLIC BLOOD PRESSURE: 89 MMHG | HEART RATE: 86 BPM | OXYGEN SATURATION: 98 % | TEMPERATURE: 98 F

## 2020-08-26 DIAGNOSIS — C18.2 MALIGNANT NEOPLASM OF ASCENDING COLON (HCC): Primary | ICD-10-CM

## 2020-08-26 PROCEDURE — 96413 CHEMO IV INFUSION 1 HR: CPT

## 2020-08-26 PROCEDURE — 96415 CHEMO IV INFUSION ADDL HR: CPT

## 2020-08-26 PROCEDURE — 96375 TX/PRO/DX INJ NEW DRUG ADDON: CPT

## 2020-08-26 PROCEDURE — 96368 THER/DIAG CONCURRENT INF: CPT

## 2020-08-26 PROCEDURE — 96411 CHEMO IV PUSH ADDL DRUG: CPT

## 2020-08-26 PROCEDURE — 96367 TX/PROPH/DG ADDL SEQ IV INF: CPT

## 2020-08-26 RX ORDER — 0.9 % SODIUM CHLORIDE 0.9 %
VIAL (ML) INJECTION
Status: DISCONTINUED
Start: 2020-08-26 | End: 2020-08-26

## 2020-08-26 RX ORDER — FLUOROURACIL 50 MG/ML
2400 INJECTION, SOLUTION INTRAVENOUS CONTINUOUS
Status: DISCONTINUED | OUTPATIENT
Start: 2020-08-26 | End: 2020-08-26

## 2020-08-26 RX ORDER — FLUOROURACIL 50 MG/ML
400 INJECTION, SOLUTION INTRAVENOUS ONCE
Status: COMPLETED | OUTPATIENT
Start: 2020-08-26 | End: 2020-08-26

## 2020-08-26 RX ADMIN — FLUOROURACIL 550 MG: 50 INJECTION, SOLUTION INTRAVENOUS at 11:18:00

## 2020-08-26 RX ADMIN — FLUOROURACIL 3450 MG: 50 INJECTION, SOLUTION INTRAVENOUS at 11:27:00

## 2020-08-26 NOTE — PROGRESS NOTES
Andria to infusion today for C4 D1 FOLFOX. Arrives Ambulating independently, accompanied by Self from MD exam.    Patient reports possible pregnancy since last therapy cycle: No     Modifications in dose or schedule: Yes.  Delay due to hospitalization with Patient aware to call with any questions or concerns.  Additional hydration appointment scheduled for next week as requested by MD.

## 2020-08-28 ENCOUNTER — NURSE ONLY (OUTPATIENT)
Dept: HEMATOLOGY/ONCOLOGY | Facility: HOSPITAL | Age: 50
End: 2020-08-28
Attending: INTERNAL MEDICINE
Payer: COMMERCIAL

## 2020-08-28 DIAGNOSIS — E86.1 HYPOVOLEMIA: ICD-10-CM

## 2020-08-28 DIAGNOSIS — R11.12 PROJECTILE VOMITING WITH NAUSEA: ICD-10-CM

## 2020-08-28 DIAGNOSIS — E87.6 HYPOKALEMIA: Primary | ICD-10-CM

## 2020-08-28 DIAGNOSIS — D50.0 IRON DEFICIENCY ANEMIA DUE TO CHRONIC BLOOD LOSS: ICD-10-CM

## 2020-08-28 PROCEDURE — 96523 IRRIG DRUG DELIVERY DEVICE: CPT

## 2020-08-28 RX ORDER — HEPARIN SODIUM (PORCINE) LOCK FLUSH IV SOLN 100 UNIT/ML 100 UNIT/ML
5 SOLUTION INTRAVENOUS ONCE
Status: COMPLETED | OUTPATIENT
Start: 2020-08-28 | End: 2020-08-28

## 2020-08-28 RX ORDER — HEPARIN SODIUM (PORCINE) LOCK FLUSH IV SOLN 100 UNIT/ML 100 UNIT/ML
5 SOLUTION INTRAVENOUS ONCE
Status: CANCELLED | OUTPATIENT
Start: 2020-08-28

## 2020-08-28 RX ORDER — 0.9 % SODIUM CHLORIDE 0.9 %
10 VIAL (ML) INJECTION ONCE
Status: CANCELLED | OUTPATIENT
Start: 2020-08-28

## 2020-08-28 RX ADMIN — HEPARIN SODIUM (PORCINE) LOCK FLUSH IV SOLN 100 UNIT/ML 500 UNITS: 100 SOLUTION INTRAVENOUS at 09:15:00

## 2020-09-01 ENCOUNTER — APPOINTMENT (OUTPATIENT)
Dept: HEMATOLOGY/ONCOLOGY | Facility: HOSPITAL | Age: 50
End: 2020-09-01
Attending: INTERNAL MEDICINE
Payer: COMMERCIAL

## 2020-09-02 ENCOUNTER — OFFICE VISIT (OUTPATIENT)
Dept: HEMATOLOGY/ONCOLOGY | Facility: HOSPITAL | Age: 50
End: 2020-09-02
Attending: INTERNAL MEDICINE
Payer: COMMERCIAL

## 2020-09-02 VITALS
OXYGEN SATURATION: 99 % | HEART RATE: 89 BPM | TEMPERATURE: 98 F | DIASTOLIC BLOOD PRESSURE: 93 MMHG | SYSTOLIC BLOOD PRESSURE: 154 MMHG | RESPIRATION RATE: 16 BRPM

## 2020-09-02 DIAGNOSIS — D50.0 IRON DEFICIENCY ANEMIA DUE TO CHRONIC BLOOD LOSS: ICD-10-CM

## 2020-09-02 DIAGNOSIS — R11.12 PROJECTILE VOMITING WITH NAUSEA: ICD-10-CM

## 2020-09-02 DIAGNOSIS — E86.1 HYPOVOLEMIA: ICD-10-CM

## 2020-09-02 DIAGNOSIS — E87.6 HYPOKALEMIA: Primary | ICD-10-CM

## 2020-09-02 PROCEDURE — 99211 OFF/OP EST MAY X REQ PHY/QHP: CPT

## 2020-09-02 RX ORDER — 0.9 % SODIUM CHLORIDE 0.9 %
10 VIAL (ML) INJECTION ONCE
Status: CANCELLED | OUTPATIENT
Start: 2020-09-02

## 2020-09-02 RX ORDER — HEPARIN SODIUM (PORCINE) LOCK FLUSH IV SOLN 100 UNIT/ML 100 UNIT/ML
5 SOLUTION INTRAVENOUS ONCE
Status: CANCELLED | OUTPATIENT
Start: 2020-09-02

## 2020-09-02 RX ORDER — HEPARIN SODIUM (PORCINE) LOCK FLUSH IV SOLN 100 UNIT/ML 100 UNIT/ML
5 SOLUTION INTRAVENOUS ONCE
Status: DISCONTINUED | OUTPATIENT
Start: 2020-09-02 | End: 2020-09-02

## 2020-09-02 NOTE — PROGRESS NOTES
Patient to center for Hydration  Snow Hadley arrived ambulatory and reports feeling well. She denies any dizziness or weakness, her appetite and fluid intake is good.    She states she does not feel the need for Hydration today  VS stable, she is not orthostati

## 2020-09-03 ENCOUNTER — APPOINTMENT (OUTPATIENT)
Dept: HEMATOLOGY/ONCOLOGY | Facility: HOSPITAL | Age: 50
End: 2020-09-03
Attending: INTERNAL MEDICINE
Payer: COMMERCIAL

## 2020-09-08 ENCOUNTER — OFFICE VISIT (OUTPATIENT)
Dept: HEMATOLOGY/ONCOLOGY | Facility: HOSPITAL | Age: 50
End: 2020-09-08
Attending: INTERNAL MEDICINE
Payer: COMMERCIAL

## 2020-09-08 VITALS
TEMPERATURE: 98 F | HEIGHT: 56 IN | DIASTOLIC BLOOD PRESSURE: 95 MMHG | HEART RATE: 93 BPM | WEIGHT: 119 LBS | SYSTOLIC BLOOD PRESSURE: 151 MMHG | BODY MASS INDEX: 26.77 KG/M2 | RESPIRATION RATE: 16 BRPM | OXYGEN SATURATION: 99 %

## 2020-09-08 DIAGNOSIS — Z51.11 CHEMOTHERAPY MANAGEMENT, ENCOUNTER FOR: ICD-10-CM

## 2020-09-08 DIAGNOSIS — G62.0 CHEMOTHERAPY-INDUCED NEUROPATHY (HCC): ICD-10-CM

## 2020-09-08 DIAGNOSIS — T45.1X5A CHEMOTHERAPY-INDUCED NEUROPATHY (HCC): ICD-10-CM

## 2020-09-08 DIAGNOSIS — R11.12 PROJECTILE VOMITING WITH NAUSEA: ICD-10-CM

## 2020-09-08 DIAGNOSIS — C18.2 MALIGNANT NEOPLASM OF ASCENDING COLON (HCC): Primary | ICD-10-CM

## 2020-09-08 DIAGNOSIS — E11.9 TYPE 2 DIABETES MELLITUS WITHOUT COMPLICATION, WITHOUT LONG-TERM CURRENT USE OF INSULIN (HCC): ICD-10-CM

## 2020-09-08 LAB
ALBUMIN SERPL-MCNC: 3.2 G/DL (ref 3.4–5)
ALBUMIN/GLOB SERPL: 0.7 {RATIO} (ref 1–2)
ALP LIVER SERPL-CCNC: 194 U/L (ref 41–108)
ALT SERPL-CCNC: 50 U/L (ref 13–56)
ANION GAP SERPL CALC-SCNC: 5 MMOL/L (ref 0–18)
AST SERPL-CCNC: 47 U/L (ref 15–37)
BASOPHILS # BLD AUTO: 0.03 X10(3) UL (ref 0–0.2)
BASOPHILS NFR BLD AUTO: 0.7 %
BILIRUB SERPL-MCNC: 0.3 MG/DL (ref 0.1–2)
BUN BLD-MCNC: 8 MG/DL (ref 7–18)
BUN/CREAT SERPL: 10.3 (ref 10–20)
CALCIUM BLD-MCNC: 9.2 MG/DL (ref 8.5–10.1)
CHLORIDE SERPL-SCNC: 105 MMOL/L (ref 98–112)
CO2 SERPL-SCNC: 28 MMOL/L (ref 21–32)
CREAT BLD-MCNC: 0.78 MG/DL (ref 0.55–1.02)
DEPRECATED RDW RBC AUTO: 51.2 FL (ref 35.1–46.3)
EOSINOPHIL # BLD AUTO: 0.21 X10(3) UL (ref 0–0.7)
EOSINOPHIL NFR BLD AUTO: 4.7 %
ERYTHROCYTE [DISTWIDTH] IN BLOOD BY AUTOMATED COUNT: 17.4 % (ref 11–15)
GLOBULIN PLAS-MCNC: 4.3 G/DL (ref 2.8–4.4)
GLUCOSE BLD-MCNC: 290 MG/DL (ref 70–99)
HCT VFR BLD AUTO: 41.1 % (ref 35–48)
HGB BLD-MCNC: 13.8 G/DL (ref 12–16)
IMM GRANULOCYTES # BLD AUTO: 0.01 X10(3) UL (ref 0–1)
IMM GRANULOCYTES NFR BLD: 0.2 %
LYMPHOCYTES # BLD AUTO: 1.74 X10(3) UL (ref 1–4)
LYMPHOCYTES NFR BLD AUTO: 38.7 %
M PROTEIN MFR SERPL ELPH: 7.5 G/DL (ref 6.4–8.2)
MCH RBC QN AUTO: 27.6 PG (ref 26–34)
MCHC RBC AUTO-ENTMCNC: 33.6 G/DL (ref 31–37)
MCV RBC AUTO: 82.2 FL (ref 80–100)
MONOCYTES # BLD AUTO: 0.33 X10(3) UL (ref 0.1–1)
MONOCYTES NFR BLD AUTO: 7.3 %
NEUTROPHILS # BLD AUTO: 2.18 X10 (3) UL (ref 1.5–7.7)
NEUTROPHILS # BLD AUTO: 2.18 X10(3) UL (ref 1.5–7.7)
NEUTROPHILS NFR BLD AUTO: 48.4 %
OSMOLALITY SERPL CALC.SUM OF ELEC: 295 MOSM/KG (ref 275–295)
PLATELET # BLD AUTO: 157 10(3)UL (ref 150–450)
POTASSIUM SERPL-SCNC: 4 MMOL/L (ref 3.5–5.1)
RBC # BLD AUTO: 5 X10(6)UL (ref 3.8–5.3)
SODIUM SERPL-SCNC: 138 MMOL/L (ref 136–145)
WBC # BLD AUTO: 4.5 X10(3) UL (ref 4–11)

## 2020-09-08 PROCEDURE — 99215 OFFICE O/P EST HI 40 MIN: CPT | Performed by: NURSE PRACTITIONER

## 2020-09-08 PROCEDURE — 85025 COMPLETE CBC W/AUTO DIFF WBC: CPT

## 2020-09-08 PROCEDURE — 36415 COLL VENOUS BLD VENIPUNCTURE: CPT

## 2020-09-08 PROCEDURE — 80053 COMPREHEN METABOLIC PANEL: CPT

## 2020-09-08 RX ORDER — FLUOROURACIL 50 MG/ML
2400 INJECTION, SOLUTION INTRAVENOUS CONTINUOUS
Status: CANCELLED | OUTPATIENT
Start: 2020-09-09

## 2020-09-08 RX ORDER — FLUOROURACIL 50 MG/ML
400 INJECTION, SOLUTION INTRAVENOUS ONCE
Status: CANCELLED | OUTPATIENT
Start: 2020-09-09

## 2020-09-08 NOTE — PROGRESS NOTES
Cancer Center Progress Note    Patient Name: Deepak Marroquin   YOB: 1970   Medical Record Number: [id number]     Chief Complaint:  Colon cancer    Oncology History:  Paula Covington is a 47year old female with high risk stage II (pT4aN0) adenoc Maternal Cousin Female          unknown primary (dtr of aunt w/colon ca)   • Cancer Maternal Cousin Female 61        stomach ca (dtr of aunt w/colon ca)       Social History:  Unchanged from consult note    Current Medications:    Current Outpatient Medica cyanosis, or bruising   Neurological: motor strength grossly intact, MA4E  Psych: appropriate mood and affect    Skin: Intact, no rash or open skin lesions        Laboratory assessed and reviewed:  CBC:    Lab Results   Component Value Date    WBC 4.5 09/0 EXPRESSION  MSH2:   LOSS OF EXPRESSION. MSH6:   LOSS OF EXPRESSION. Impression and Plan:  Lynda Morton is a 47year old female with high risk stage II (pT4aN0) adenocarcinoma of the right colon  Status post resection 6/5/2020.   MSI-H  She started adjuv

## 2020-09-09 ENCOUNTER — OFFICE VISIT (OUTPATIENT)
Dept: HEMATOLOGY/ONCOLOGY | Facility: HOSPITAL | Age: 50
End: 2020-09-09
Attending: INTERNAL MEDICINE
Payer: COMMERCIAL

## 2020-09-09 VITALS
HEART RATE: 94 BPM | DIASTOLIC BLOOD PRESSURE: 79 MMHG | OXYGEN SATURATION: 100 % | RESPIRATION RATE: 16 BRPM | SYSTOLIC BLOOD PRESSURE: 152 MMHG | TEMPERATURE: 98 F

## 2020-09-09 DIAGNOSIS — C18.2 MALIGNANT NEOPLASM OF ASCENDING COLON (HCC): Primary | ICD-10-CM

## 2020-09-09 PROCEDURE — 96413 CHEMO IV INFUSION 1 HR: CPT

## 2020-09-09 PROCEDURE — 96411 CHEMO IV PUSH ADDL DRUG: CPT

## 2020-09-09 PROCEDURE — 96415 CHEMO IV INFUSION ADDL HR: CPT

## 2020-09-09 PROCEDURE — 96368 THER/DIAG CONCURRENT INF: CPT

## 2020-09-09 PROCEDURE — 96375 TX/PRO/DX INJ NEW DRUG ADDON: CPT

## 2020-09-09 PROCEDURE — 96367 TX/PROPH/DG ADDL SEQ IV INF: CPT

## 2020-09-09 RX ORDER — FLUOROURACIL 50 MG/ML
2400 INJECTION, SOLUTION INTRAVENOUS CONTINUOUS
Status: DISCONTINUED | OUTPATIENT
Start: 2020-09-09 | End: 2020-09-09

## 2020-09-09 RX ORDER — 0.9 % SODIUM CHLORIDE 0.9 %
VIAL (ML) INJECTION
Status: DISCONTINUED
Start: 2020-09-09 | End: 2020-09-09

## 2020-09-09 RX ORDER — FLUOROURACIL 50 MG/ML
400 INJECTION, SOLUTION INTRAVENOUS ONCE
Status: COMPLETED | OUTPATIENT
Start: 2020-09-09 | End: 2020-09-09

## 2020-09-09 RX ADMIN — FLUOROURACIL 3450 MG: 50 INJECTION, SOLUTION INTRAVENOUS at 12:16:00

## 2020-09-09 RX ADMIN — FLUOROURACIL 550 MG: 50 INJECTION, SOLUTION INTRAVENOUS at 12:10:00

## 2020-09-09 NOTE — PROGRESS NOTES
Andria to infusion today for C5D1 FOLFOX.   Arrives Ambulating independently, accompanied by Self     Patient reports possible pregnancy since last therapy cycle: No     Modifications in dose or schedule: No, pt said she tolerated last treatment much better

## 2020-09-10 ENCOUNTER — APPOINTMENT (OUTPATIENT)
Dept: HEMATOLOGY/ONCOLOGY | Facility: HOSPITAL | Age: 50
End: 2020-09-10
Attending: INTERNAL MEDICINE
Payer: COMMERCIAL

## 2020-09-11 ENCOUNTER — NURSE ONLY (OUTPATIENT)
Dept: HEMATOLOGY/ONCOLOGY | Facility: HOSPITAL | Age: 50
End: 2020-09-11
Attending: INTERNAL MEDICINE
Payer: COMMERCIAL

## 2020-09-11 VITALS — RESPIRATION RATE: 16 BRPM | TEMPERATURE: 97 F

## 2020-09-11 DIAGNOSIS — R11.12 PROJECTILE VOMITING WITH NAUSEA: ICD-10-CM

## 2020-09-11 DIAGNOSIS — D50.0 IRON DEFICIENCY ANEMIA DUE TO CHRONIC BLOOD LOSS: ICD-10-CM

## 2020-09-11 DIAGNOSIS — E86.1 HYPOVOLEMIA: ICD-10-CM

## 2020-09-11 DIAGNOSIS — E87.6 HYPOKALEMIA: Primary | ICD-10-CM

## 2020-09-11 PROCEDURE — 96523 IRRIG DRUG DELIVERY DEVICE: CPT

## 2020-09-11 RX ORDER — HEPARIN SODIUM (PORCINE) LOCK FLUSH IV SOLN 100 UNIT/ML 100 UNIT/ML
5 SOLUTION INTRAVENOUS ONCE
Status: COMPLETED | OUTPATIENT
Start: 2020-09-11 | End: 2020-09-11

## 2020-09-11 RX ORDER — HEPARIN SODIUM (PORCINE) LOCK FLUSH IV SOLN 100 UNIT/ML 100 UNIT/ML
5 SOLUTION INTRAVENOUS ONCE
Status: CANCELLED | OUTPATIENT
Start: 2020-09-11

## 2020-09-11 RX ORDER — 0.9 % SODIUM CHLORIDE 0.9 %
10 VIAL (ML) INJECTION ONCE
Status: CANCELLED | OUTPATIENT
Start: 2020-09-11

## 2020-09-11 RX ADMIN — HEPARIN SODIUM (PORCINE) LOCK FLUSH IV SOLN 100 UNIT/ML 500 UNITS: 100 SOLUTION INTRAVENOUS at 10:52:00

## 2020-09-12 ENCOUNTER — OFFICE VISIT (OUTPATIENT)
Dept: HEMATOLOGY/ONCOLOGY | Facility: HOSPITAL | Age: 50
End: 2020-09-12
Attending: INTERNAL MEDICINE
Payer: COMMERCIAL

## 2020-09-12 VITALS
RESPIRATION RATE: 16 BRPM | DIASTOLIC BLOOD PRESSURE: 89 MMHG | SYSTOLIC BLOOD PRESSURE: 155 MMHG | HEART RATE: 95 BPM | TEMPERATURE: 98 F | OXYGEN SATURATION: 99 %

## 2020-09-12 DIAGNOSIS — E86.1 HYPOVOLEMIA: ICD-10-CM

## 2020-09-12 DIAGNOSIS — E87.6 HYPOKALEMIA: ICD-10-CM

## 2020-09-12 DIAGNOSIS — D50.0 IRON DEFICIENCY ANEMIA DUE TO CHRONIC BLOOD LOSS: ICD-10-CM

## 2020-09-12 DIAGNOSIS — R11.12 PROJECTILE VOMITING WITH NAUSEA: Primary | ICD-10-CM

## 2020-09-12 PROCEDURE — 96360 HYDRATION IV INFUSION INIT: CPT

## 2020-09-12 RX ORDER — HEPARIN SODIUM (PORCINE) LOCK FLUSH IV SOLN 100 UNIT/ML 100 UNIT/ML
SOLUTION INTRAVENOUS
Status: COMPLETED
Start: 2020-09-12 | End: 2020-09-12

## 2020-09-12 RX ORDER — HEPARIN SODIUM (PORCINE) LOCK FLUSH IV SOLN 100 UNIT/ML 100 UNIT/ML
5 SOLUTION INTRAVENOUS ONCE
Status: CANCELLED | OUTPATIENT
Start: 2020-09-12

## 2020-09-12 RX ORDER — 0.9 % SODIUM CHLORIDE 0.9 %
10 VIAL (ML) INJECTION ONCE
Status: CANCELLED | OUTPATIENT
Start: 2020-09-12

## 2020-09-12 RX ORDER — HEPARIN SODIUM (PORCINE) LOCK FLUSH IV SOLN 100 UNIT/ML 100 UNIT/ML
5 SOLUTION INTRAVENOUS ONCE
Status: COMPLETED | OUTPATIENT
Start: 2020-09-12 | End: 2020-09-12

## 2020-09-12 RX ORDER — 0.9 % SODIUM CHLORIDE 0.9 %
VIAL (ML) INJECTION
Status: DISCONTINUED
Start: 2020-09-12 | End: 2020-09-12

## 2020-09-12 RX ADMIN — HEPARIN SODIUM (PORCINE) LOCK FLUSH IV SOLN 100 UNIT/ML 500 UNITS: 100 SOLUTION INTRAVENOUS at 10:20:00

## 2020-09-12 NOTE — PROGRESS NOTES
Pt to infusion for hydration. Noted last treatment with Folfox on 9/9/20. Pt denies dizziness/lighteadedness. States appetite and hydration is good. Denies nausea. States emesis x 1 that \"came out of nowhere\" and some weakness this morning.   Has PO a

## 2020-09-16 ENCOUNTER — OFFICE VISIT (OUTPATIENT)
Dept: SURGERY | Facility: CLINIC | Age: 50
End: 2020-09-16
Payer: COMMERCIAL

## 2020-09-16 VITALS
HEART RATE: 85 BPM | SYSTOLIC BLOOD PRESSURE: 149 MMHG | BODY MASS INDEX: 28 KG/M2 | RESPIRATION RATE: 16 BRPM | WEIGHT: 125.81 LBS | DIASTOLIC BLOOD PRESSURE: 89 MMHG | OXYGEN SATURATION: 99 %

## 2020-09-16 DIAGNOSIS — C18.2 MALIGNANT NEOPLASM OF ASCENDING COLON (HCC): Primary | ICD-10-CM

## 2020-09-16 PROCEDURE — 3079F DIAST BP 80-89 MM HG: CPT | Performed by: SURGERY

## 2020-09-16 PROCEDURE — 3077F SYST BP >= 140 MM HG: CPT | Performed by: SURGERY

## 2020-09-16 PROCEDURE — 99213 OFFICE O/P EST LOW 20 MIN: CPT | Performed by: SURGERY

## 2020-09-16 NOTE — PROGRESS NOTES
[facility]    Patient Name:  Tosha Thomas   YOB: 1970   Gender:  Female   Appt Date: 9/16/2020   Provider:  Darwin Dobbs MD   Insurance:       PATIENT PROVIDERS  Referring Provider:        Edinson Peters into the visceral peritoneum. 0 out of 28 lymph nodes were positive for carcinoma. Final stage was PT4AN0. Importantly, immunohistochemistry demonstrated loss of expression of MLH1, PMS2, MSH6 and MSH2. She began FOLFOX on 7/13/2020.  Patient returns tod children: Not on file      Years of education: Not on file      Highest education level: Not on file    Tobacco Use      Smoking status: Never Smoker      Smokeless tobacco: Never Used    Substance and Sexual Activity      Alcohol use:  Yes        Alcohol/w Head: Normocephalic. Eyes: Pupils are equal, round, and reactive to light. EOM are normal.   Neck: Normal range of motion. Cardiovascular: Normal rate and regular rhythm. No murmur heard.   Pulmonary/Chest: Effort normal and breath sounds normal. N hyperplasia.     [de-identified] eight ymph nodes with no evidence of metastatic carcinoma (0/28)  pT4a, N0     Comment:  Immunohistochemical stain for cytokeratin 8/18 is positive, supporting the epithelial origin of the tumor cells.     For  vaughn Invasion  Not identified    Treatment Effect  No known presurgical therapy    MARGINS   Margins  All margins are uninvolved by invasive carcinoma, high-grade dysplasia, intramucosal adenocarcinoma, and adenoma    Margins Examined  Proximal      Distal cecum (3.2 cm in greatest dimension). The mass also involves the ileocecal valve. The uninvolved mucosa of the terminal ileum and ascending colon is pinkish gray and folded. No other lesions or masses are grossly identified.  The appendix is grossly unremar [facility], [address] [place] [address]  T: [phone]  F: [phone]  Pager: [phone]          Follow Up:  7 months     Agree with above a/p  No issues postop    Suha Reece MD  Complex General Surgical Oncology  [facility] [address]  Pager [phone]  [email or web address]

## 2020-09-21 ENCOUNTER — APPOINTMENT (OUTPATIENT)
Dept: HEMATOLOGY/ONCOLOGY | Facility: HOSPITAL | Age: 50
End: 2020-09-21
Attending: INTERNAL MEDICINE
Payer: COMMERCIAL

## 2020-09-22 ENCOUNTER — NURSE ONLY (OUTPATIENT)
Dept: HEMATOLOGY/ONCOLOGY | Facility: HOSPITAL | Age: 50
End: 2020-09-22
Attending: INTERNAL MEDICINE
Payer: COMMERCIAL

## 2020-09-22 VITALS
TEMPERATURE: 98 F | DIASTOLIC BLOOD PRESSURE: 86 MMHG | WEIGHT: 126 LBS | BODY MASS INDEX: 28.34 KG/M2 | SYSTOLIC BLOOD PRESSURE: 150 MMHG | OXYGEN SATURATION: 99 % | HEIGHT: 56 IN | RESPIRATION RATE: 16 BRPM | HEART RATE: 91 BPM

## 2020-09-22 DIAGNOSIS — T45.1X5A CHEMOTHERAPY-INDUCED NEUROPATHY (HCC): ICD-10-CM

## 2020-09-22 DIAGNOSIS — C18.2 MALIGNANT NEOPLASM OF ASCENDING COLON (HCC): Primary | ICD-10-CM

## 2020-09-22 DIAGNOSIS — Z51.11 CHEMOTHERAPY MANAGEMENT, ENCOUNTER FOR: ICD-10-CM

## 2020-09-22 DIAGNOSIS — R11.2 CHEMOTHERAPY INDUCED NAUSEA AND VOMITING: ICD-10-CM

## 2020-09-22 DIAGNOSIS — T45.1X5A CHEMOTHERAPY INDUCED NAUSEA AND VOMITING: ICD-10-CM

## 2020-09-22 DIAGNOSIS — E11.9 TYPE 2 DIABETES MELLITUS WITHOUT COMPLICATION, WITHOUT LONG-TERM CURRENT USE OF INSULIN (HCC): ICD-10-CM

## 2020-09-22 DIAGNOSIS — E87.6 HYPOKALEMIA: ICD-10-CM

## 2020-09-22 DIAGNOSIS — D50.0 IRON DEFICIENCY ANEMIA DUE TO CHRONIC BLOOD LOSS: ICD-10-CM

## 2020-09-22 DIAGNOSIS — E86.1 HYPOVOLEMIA: ICD-10-CM

## 2020-09-22 DIAGNOSIS — G62.0 CHEMOTHERAPY-INDUCED NEUROPATHY (HCC): ICD-10-CM

## 2020-09-22 DIAGNOSIS — R11.12 PROJECTILE VOMITING WITH NAUSEA: ICD-10-CM

## 2020-09-22 LAB
ALBUMIN SERPL-MCNC: 3.3 G/DL (ref 3.4–5)
ALBUMIN/GLOB SERPL: 0.8 {RATIO} (ref 1–2)
ALP LIVER SERPL-CCNC: 227 U/L
ALT SERPL-CCNC: 60 U/L
ANION GAP SERPL CALC-SCNC: 3 MMOL/L (ref 0–18)
AST SERPL-CCNC: 46 U/L (ref 15–37)
BASOPHILS # BLD AUTO: 0.03 X10(3) UL (ref 0–0.2)
BASOPHILS NFR BLD AUTO: 0.8 %
BILIRUB SERPL-MCNC: 0.3 MG/DL (ref 0.1–2)
BUN BLD-MCNC: 9 MG/DL (ref 7–18)
BUN/CREAT SERPL: 11.1 (ref 10–20)
CALCIUM BLD-MCNC: 9.5 MG/DL (ref 8.5–10.1)
CHLORIDE SERPL-SCNC: 103 MMOL/L (ref 98–112)
CO2 SERPL-SCNC: 30 MMOL/L (ref 21–32)
CREAT BLD-MCNC: 0.81 MG/DL
DEPRECATED RDW RBC AUTO: 49 FL (ref 35.1–46.3)
EOSINOPHIL # BLD AUTO: 0.11 X10(3) UL (ref 0–0.7)
EOSINOPHIL NFR BLD AUTO: 2.8 %
ERYTHROCYTE [DISTWIDTH] IN BLOOD BY AUTOMATED COUNT: 16.5 % (ref 11–15)
GLOBULIN PLAS-MCNC: 4.4 G/DL (ref 2.8–4.4)
GLUCOSE BLD-MCNC: 378 MG/DL (ref 70–99)
HCT VFR BLD AUTO: 39.4 %
HGB BLD-MCNC: 13.6 G/DL
IMM GRANULOCYTES # BLD AUTO: 0 X10(3) UL (ref 0–1)
IMM GRANULOCYTES NFR BLD: 0 %
LYMPHOCYTES # BLD AUTO: 1.32 X10(3) UL (ref 1–4)
LYMPHOCYTES NFR BLD AUTO: 33.7 %
M PROTEIN MFR SERPL ELPH: 7.7 G/DL (ref 6.4–8.2)
MCH RBC QN AUTO: 28.3 PG (ref 26–34)
MCHC RBC AUTO-ENTMCNC: 34.5 G/DL (ref 31–37)
MCV RBC AUTO: 81.9 FL
MONOCYTES # BLD AUTO: 0.28 X10(3) UL (ref 0.1–1)
MONOCYTES NFR BLD AUTO: 7.1 %
NEUTROPHILS # BLD AUTO: 2.18 X10 (3) UL (ref 1.5–7.7)
NEUTROPHILS # BLD AUTO: 2.18 X10(3) UL (ref 1.5–7.7)
NEUTROPHILS NFR BLD AUTO: 55.6 %
OSMOLALITY SERPL CALC.SUM OF ELEC: 296 MOSM/KG (ref 275–295)
PLATELET # BLD AUTO: 190 10(3)UL (ref 150–450)
POTASSIUM SERPL-SCNC: 3.7 MMOL/L (ref 3.5–5.1)
RBC # BLD AUTO: 4.81 X10(6)UL
SODIUM SERPL-SCNC: 136 MMOL/L (ref 136–145)
WBC # BLD AUTO: 3.9 X10(3) UL (ref 4–11)

## 2020-09-22 PROCEDURE — 36415 COLL VENOUS BLD VENIPUNCTURE: CPT

## 2020-09-22 PROCEDURE — 80053 COMPREHEN METABOLIC PANEL: CPT

## 2020-09-22 PROCEDURE — 99215 OFFICE O/P EST HI 40 MIN: CPT | Performed by: NURSE PRACTITIONER

## 2020-09-22 PROCEDURE — 85025 COMPLETE CBC W/AUTO DIFF WBC: CPT

## 2020-09-22 RX ORDER — HEPARIN SODIUM (PORCINE) LOCK FLUSH IV SOLN 100 UNIT/ML 100 UNIT/ML
5 SOLUTION INTRAVENOUS ONCE
Status: CANCELLED | OUTPATIENT
Start: 2020-09-22

## 2020-09-22 RX ORDER — 0.9 % SODIUM CHLORIDE 0.9 %
VIAL (ML) INJECTION
Status: DISCONTINUED
Start: 2020-09-22 | End: 2020-09-22 | Stop reason: WASHOUT

## 2020-09-22 RX ORDER — HEPARIN SODIUM (PORCINE) LOCK FLUSH IV SOLN 100 UNIT/ML 100 UNIT/ML
5 SOLUTION INTRAVENOUS ONCE
Status: DISCONTINUED | OUTPATIENT
Start: 2020-09-22 | End: 2020-09-22

## 2020-09-22 RX ORDER — FLUOROURACIL 50 MG/ML
400 INJECTION, SOLUTION INTRAVENOUS ONCE
Status: CANCELLED | OUTPATIENT
Start: 2020-09-23

## 2020-09-22 RX ORDER — 0.9 % SODIUM CHLORIDE 0.9 %
10 VIAL (ML) INJECTION ONCE
Status: CANCELLED | OUTPATIENT
Start: 2020-09-22

## 2020-09-22 RX ORDER — FLUOROURACIL 50 MG/ML
2400 INJECTION, SOLUTION INTRAVENOUS CONTINUOUS
Status: CANCELLED | OUTPATIENT
Start: 2020-09-23

## 2020-09-22 NOTE — PROGRESS NOTES
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 MSI-H  She started adjuvant FOLFOX July 2020. Proceed with Cycle #6. s/p dose reduction of Oxaliplatin to 65m2 since C4, will discontinue with C6 d/t worsening neuropathy.    -RTC with labs and MD visit with [facility] for post-chemo f/u in 8 weeks, a

## 2020-09-23 ENCOUNTER — APPOINTMENT (OUTPATIENT)
Dept: HEMATOLOGY/ONCOLOGY | Facility: HOSPITAL | Age: 50
End: 2020-09-23
Attending: INTERNAL MEDICINE
Payer: COMMERCIAL

## 2020-09-23 VITALS
RESPIRATION RATE: 16 BRPM | SYSTOLIC BLOOD PRESSURE: 144 MMHG | DIASTOLIC BLOOD PRESSURE: 77 MMHG | HEART RATE: 92 BPM | OXYGEN SATURATION: 99 % | TEMPERATURE: 97 F

## 2020-09-23 DIAGNOSIS — C18.2 MALIGNANT NEOPLASM OF ASCENDING COLON (HCC): Primary | ICD-10-CM

## 2020-09-23 PROCEDURE — 96409 CHEMO IV PUSH SNGL DRUG: CPT

## 2020-09-23 PROCEDURE — 96375 TX/PRO/DX INJ NEW DRUG ADDON: CPT

## 2020-09-23 PROCEDURE — 96367 TX/PROPH/DG ADDL SEQ IV INF: CPT

## 2020-09-23 RX ORDER — FLUOROURACIL 50 MG/ML
400 INJECTION, SOLUTION INTRAVENOUS ONCE
Status: COMPLETED | OUTPATIENT
Start: 2020-09-23 | End: 2020-09-23

## 2020-09-23 RX ORDER — FLUOROURACIL 50 MG/ML
2400 INJECTION, SOLUTION INTRAVENOUS CONTINUOUS
Status: DISCONTINUED | OUTPATIENT
Start: 2020-09-23 | End: 2020-09-23

## 2020-09-23 RX ADMIN — FLUOROURACIL 3450 MG: 50 INJECTION, SOLUTION INTRAVENOUS at 11:01:00

## 2020-09-23 RX ADMIN — FLUOROURACIL 550 MG: 50 INJECTION, SOLUTION INTRAVENOUS at 10:53:00

## 2020-09-23 NOTE — PROGRESS NOTES
Andria to infusion today for C6 D1 Leucovorin + 5FU  Arrives Ambulating independently, accompanied by Self     Patient reports possible pregnancy since last therapy cycle: No     Modifications in dose or schedule: yes, oxaliplatin removed.  Last cycle in tx

## 2020-09-25 ENCOUNTER — NURSE ONLY (OUTPATIENT)
Dept: HEMATOLOGY/ONCOLOGY | Facility: HOSPITAL | Age: 50
End: 2020-09-25
Attending: INTERNAL MEDICINE
Payer: COMMERCIAL

## 2020-09-25 VITALS
SYSTOLIC BLOOD PRESSURE: 147 MMHG | RESPIRATION RATE: 16 BRPM | TEMPERATURE: 99 F | HEART RATE: 74 BPM | DIASTOLIC BLOOD PRESSURE: 70 MMHG

## 2020-09-25 DIAGNOSIS — E87.6 HYPOKALEMIA: Primary | ICD-10-CM

## 2020-09-25 DIAGNOSIS — E86.1 HYPOVOLEMIA: ICD-10-CM

## 2020-09-25 DIAGNOSIS — D50.0 IRON DEFICIENCY ANEMIA DUE TO CHRONIC BLOOD LOSS: ICD-10-CM

## 2020-09-25 DIAGNOSIS — R11.12 PROJECTILE VOMITING WITH NAUSEA: ICD-10-CM

## 2020-09-25 PROCEDURE — 96523 IRRIG DRUG DELIVERY DEVICE: CPT

## 2020-09-25 RX ORDER — 0.9 % SODIUM CHLORIDE 0.9 %
10 VIAL (ML) INJECTION ONCE
Status: CANCELLED | OUTPATIENT
Start: 2020-09-25

## 2020-09-25 RX ORDER — HEPARIN SODIUM (PORCINE) LOCK FLUSH IV SOLN 100 UNIT/ML 100 UNIT/ML
5 SOLUTION INTRAVENOUS ONCE
Status: CANCELLED | OUTPATIENT
Start: 2020-09-25

## 2020-09-25 RX ORDER — HEPARIN SODIUM (PORCINE) LOCK FLUSH IV SOLN 100 UNIT/ML 100 UNIT/ML
5 SOLUTION INTRAVENOUS ONCE
Status: COMPLETED | OUTPATIENT
Start: 2020-09-25 | End: 2020-09-25

## 2020-09-25 RX ADMIN — HEPARIN SODIUM (PORCINE) LOCK FLUSH IV SOLN 100 UNIT/ML 500 UNITS: 100 SOLUTION INTRAVENOUS at 09:47:00

## 2020-11-18 ENCOUNTER — OFFICE VISIT (OUTPATIENT)
Dept: HEMATOLOGY/ONCOLOGY | Facility: HOSPITAL | Age: 50
End: 2020-11-18
Attending: INTERNAL MEDICINE
Payer: COMMERCIAL

## 2020-11-18 VITALS
SYSTOLIC BLOOD PRESSURE: 163 MMHG | HEART RATE: 78 BPM | BODY MASS INDEX: 29.02 KG/M2 | OXYGEN SATURATION: 100 % | HEIGHT: 56 IN | DIASTOLIC BLOOD PRESSURE: 89 MMHG | RESPIRATION RATE: 16 BRPM | WEIGHT: 129 LBS | TEMPERATURE: 98 F

## 2020-11-18 DIAGNOSIS — E87.6 HYPOKALEMIA: Primary | ICD-10-CM

## 2020-11-18 DIAGNOSIS — D50.0 IRON DEFICIENCY ANEMIA DUE TO CHRONIC BLOOD LOSS: ICD-10-CM

## 2020-11-18 DIAGNOSIS — Z51.11 CHEMOTHERAPY MANAGEMENT, ENCOUNTER FOR: ICD-10-CM

## 2020-11-18 DIAGNOSIS — E86.1 HYPOVOLEMIA: ICD-10-CM

## 2020-11-18 DIAGNOSIS — C18.2 MALIGNANT NEOPLASM OF ASCENDING COLON (HCC): ICD-10-CM

## 2020-11-18 DIAGNOSIS — R11.12 PROJECTILE VOMITING WITH NAUSEA: ICD-10-CM

## 2020-11-18 DIAGNOSIS — C18.2 MALIGNANT NEOPLASM OF ASCENDING COLON (HCC): Primary | ICD-10-CM

## 2020-11-18 DIAGNOSIS — R21 RASH AND NONSPECIFIC SKIN ERUPTION: ICD-10-CM

## 2020-11-18 DIAGNOSIS — G60.8 COLD INDUCED NEUROPATHY: ICD-10-CM

## 2020-11-18 PROBLEM — R11.2 INTRACTABLE NAUSEA AND VOMITING: Status: RESOLVED | Noted: 2020-07-18 | Resolved: 2020-11-18

## 2020-11-18 PROBLEM — T45.1X5A CHEMOTHERAPY-INDUCED NAUSEA AND VOMITING: Status: RESOLVED | Noted: 2020-08-16 | Resolved: 2020-11-18

## 2020-11-18 PROBLEM — R11.2 CHEMOTHERAPY-INDUCED NAUSEA AND VOMITING: Status: RESOLVED | Noted: 2020-08-16 | Resolved: 2020-11-18

## 2020-11-18 PROBLEM — D75.839 THROMBOCYTOSIS: Status: RESOLVED | Noted: 2020-06-29 | Resolved: 2020-11-18

## 2020-11-18 PROCEDURE — 85025 COMPLETE CBC W/AUTO DIFF WBC: CPT

## 2020-11-18 PROCEDURE — 99214 OFFICE O/P EST MOD 30 MIN: CPT | Performed by: INTERNAL MEDICINE

## 2020-11-18 PROCEDURE — 36591 DRAW BLOOD OFF VENOUS DEVICE: CPT

## 2020-11-18 PROCEDURE — 82378 CARCINOEMBRYONIC ANTIGEN: CPT

## 2020-11-18 PROCEDURE — 80053 COMPREHEN METABOLIC PANEL: CPT

## 2020-11-18 RX ORDER — HEPARIN SODIUM (PORCINE) LOCK FLUSH IV SOLN 100 UNIT/ML 100 UNIT/ML
5 SOLUTION INTRAVENOUS ONCE
Status: COMPLETED | OUTPATIENT
Start: 2020-11-18 | End: 2020-11-18

## 2020-11-18 RX ORDER — SODIUM CHLORIDE 9 MG/ML
INJECTION INTRAVENOUS
Status: DISCONTINUED
Start: 2020-11-18 | End: 2020-11-18

## 2020-11-18 RX ORDER — SODIUM CHLORIDE 9 MG/ML
10 INJECTION INTRAVENOUS ONCE
Status: CANCELLED | OUTPATIENT
Start: 2020-11-18

## 2020-11-18 RX ORDER — HEPARIN SODIUM (PORCINE) LOCK FLUSH IV SOLN 100 UNIT/ML 100 UNIT/ML
5 SOLUTION INTRAVENOUS ONCE
Status: CANCELLED | OUTPATIENT
Start: 2020-11-18

## 2020-11-18 RX ADMIN — HEPARIN SODIUM (PORCINE) LOCK FLUSH IV SOLN 100 UNIT/ML 500 UNITS: 100 SOLUTION INTRAVENOUS at 11:45:00

## 2020-11-18 NOTE — PROGRESS NOTES
Cancer Center Progress Note    Patient Name: Radha Ledesma   YOB: 1970   Medical Record Number: [id number]     Chief Complaint:  Colon cancer    Oncology History:  Norm Rutledge is a 47year old female with high risk stage II (pT4aN0) adenoc from consult note    Current Medications:    Current Outpatient Medications:   •  acetaminophen 500 MG Oral Tab, Take 500 mg by mouth every 6 (six) hours as needed for Pain., Disp: , Rfl:   •  metoprolol Tartrate 25 MG Oral Tab, Take 1 tablet (25 mg total) Component Value Date    .0 11/18/2020    .0 09/22/2020    .0 09/08/2020       Lab Results   Component Value Date     (H) 11/18/2020    BUN 11 11/18/2020    BUNCREA 17.2 11/18/2020    CREATSERUM 0.64 11/18/2020    ANIONGAP 6 11 elevation of LFTs-  - improved except for Alk phos, repeat next visit, if still elevated, get fractinaltion and imaging    chemi-induced peripheral neuropathy  - Gr2, more continuous to fingers/toes, and occasionally affects fine motors.    - Cymbalta offer

## 2020-11-19 ENCOUNTER — TELEPHONE (OUTPATIENT)
Dept: HEMATOLOGY/ONCOLOGY | Facility: HOSPITAL | Age: 50
End: 2020-11-19

## 2021-02-18 ENCOUNTER — OFFICE VISIT (OUTPATIENT)
Dept: HEMATOLOGY/ONCOLOGY | Facility: HOSPITAL | Age: 51
End: 2021-02-18
Attending: INTERNAL MEDICINE
Payer: COMMERCIAL

## 2021-02-18 VITALS
BODY MASS INDEX: 31.49 KG/M2 | OXYGEN SATURATION: 98 % | SYSTOLIC BLOOD PRESSURE: 151 MMHG | HEART RATE: 83 BPM | WEIGHT: 140 LBS | TEMPERATURE: 98 F | DIASTOLIC BLOOD PRESSURE: 84 MMHG | RESPIRATION RATE: 16 BRPM | HEIGHT: 56 IN

## 2021-02-18 DIAGNOSIS — C18.2 MALIGNANT NEOPLASM OF ASCENDING COLON (HCC): Primary | ICD-10-CM

## 2021-02-18 DIAGNOSIS — D50.0 IRON DEFICIENCY ANEMIA DUE TO CHRONIC BLOOD LOSS: ICD-10-CM

## 2021-02-18 DIAGNOSIS — G62.0 CHEMOTHERAPY-INDUCED NEUROPATHY (HCC): ICD-10-CM

## 2021-02-18 DIAGNOSIS — R11.12 PROJECTILE VOMITING WITH NAUSEA: ICD-10-CM

## 2021-02-18 DIAGNOSIS — E87.6 HYPOKALEMIA: ICD-10-CM

## 2021-02-18 DIAGNOSIS — T45.1X5A CHEMOTHERAPY-INDUCED NEUROPATHY (HCC): ICD-10-CM

## 2021-02-18 DIAGNOSIS — E86.1 HYPOVOLEMIA: ICD-10-CM

## 2021-02-18 LAB
ALBUMIN SERPL-MCNC: 3.3 G/DL (ref 3.4–5)
ALBUMIN/GLOB SERPL: 0.8 {RATIO} (ref 1–2)
ALP LIVER SERPL-CCNC: 162 U/L
ALT SERPL-CCNC: 52 U/L
ANION GAP SERPL CALC-SCNC: 5 MMOL/L (ref 0–18)
AST SERPL-CCNC: 34 U/L (ref 15–37)
BASOPHILS # BLD AUTO: 0.03 X10(3) UL (ref 0–0.2)
BASOPHILS NFR BLD AUTO: 0.6 %
BILIRUB SERPL-MCNC: 0.5 MG/DL (ref 0.1–2)
BUN BLD-MCNC: 9 MG/DL (ref 7–18)
BUN/CREAT SERPL: 15.5 (ref 10–20)
CALCIUM BLD-MCNC: 8.9 MG/DL (ref 8.5–10.1)
CEA SERPL-MCNC: 2.7 NG/ML (ref ?–5)
CHLORIDE SERPL-SCNC: 108 MMOL/L (ref 98–112)
CO2 SERPL-SCNC: 29 MMOL/L (ref 21–32)
CREAT BLD-MCNC: 0.58 MG/DL
DEPRECATED RDW RBC AUTO: 38 FL (ref 35.1–46.3)
EOSINOPHIL # BLD AUTO: 0.15 X10(3) UL (ref 0–0.7)
EOSINOPHIL NFR BLD AUTO: 2.9 %
ERYTHROCYTE [DISTWIDTH] IN BLOOD BY AUTOMATED COUNT: 12.4 % (ref 11–15)
GLOBULIN PLAS-MCNC: 3.9 G/DL (ref 2.8–4.4)
GLUCOSE BLD-MCNC: 152 MG/DL (ref 70–99)
HCT VFR BLD AUTO: 36.9 %
HGB BLD-MCNC: 12.4 G/DL
IMM GRANULOCYTES # BLD AUTO: 0.01 X10(3) UL (ref 0–1)
IMM GRANULOCYTES NFR BLD: 0.2 %
LYMPHOCYTES # BLD AUTO: 1.86 X10(3) UL (ref 1–4)
LYMPHOCYTES NFR BLD AUTO: 35.8 %
M PROTEIN MFR SERPL ELPH: 7.2 G/DL (ref 6.4–8.2)
MCH RBC QN AUTO: 28.2 PG (ref 26–34)
MCHC RBC AUTO-ENTMCNC: 33.6 G/DL (ref 31–37)
MCV RBC AUTO: 83.9 FL
MONOCYTES # BLD AUTO: 0.31 X10(3) UL (ref 0.1–1)
MONOCYTES NFR BLD AUTO: 6 %
NEUTROPHILS # BLD AUTO: 2.84 X10 (3) UL (ref 1.5–7.7)
NEUTROPHILS # BLD AUTO: 2.84 X10(3) UL (ref 1.5–7.7)
NEUTROPHILS NFR BLD AUTO: 54.5 %
OSMOLALITY SERPL CALC.SUM OF ELEC: 296 MOSM/KG (ref 275–295)
PLATELET # BLD AUTO: 290 10(3)UL (ref 150–450)
POTASSIUM SERPL-SCNC: 3.7 MMOL/L (ref 3.5–5.1)
RBC # BLD AUTO: 4.4 X10(6)UL
SODIUM SERPL-SCNC: 142 MMOL/L (ref 136–145)
WBC # BLD AUTO: 5.2 X10(3) UL (ref 4–11)

## 2021-02-18 PROCEDURE — 85025 COMPLETE CBC W/AUTO DIFF WBC: CPT

## 2021-02-18 PROCEDURE — 82378 CARCINOEMBRYONIC ANTIGEN: CPT

## 2021-02-18 PROCEDURE — 36591 DRAW BLOOD OFF VENOUS DEVICE: CPT

## 2021-02-18 PROCEDURE — 99214 OFFICE O/P EST MOD 30 MIN: CPT | Performed by: INTERNAL MEDICINE

## 2021-02-18 PROCEDURE — 80053 COMPREHEN METABOLIC PANEL: CPT

## 2021-02-18 RX ORDER — 0.9 % SODIUM CHLORIDE 0.9 %
10 VIAL (ML) INJECTION ONCE
OUTPATIENT
Start: 2021-02-18

## 2021-02-18 RX ORDER — HEPARIN SODIUM (PORCINE) LOCK FLUSH IV SOLN 100 UNIT/ML 100 UNIT/ML
5 SOLUTION INTRAVENOUS ONCE
Status: COMPLETED | OUTPATIENT
Start: 2021-02-18 | End: 2021-02-18

## 2021-02-18 RX ORDER — DULOXETIN HYDROCHLORIDE 30 MG/1
30 CAPSULE, DELAYED RELEASE ORAL DAILY
Qty: 30 CAPSULE | Refills: 0 | Status: SHIPPED | OUTPATIENT
Start: 2021-02-18 | End: 2021-05-26

## 2021-02-18 RX ORDER — SODIUM CHLORIDE 9 MG/ML
INJECTION INTRAVENOUS
Status: DISCONTINUED
Start: 2021-02-18 | End: 2021-02-18

## 2021-02-18 RX ORDER — HEPARIN SODIUM (PORCINE) LOCK FLUSH IV SOLN 100 UNIT/ML 100 UNIT/ML
5 SOLUTION INTRAVENOUS ONCE
Status: CANCELLED | OUTPATIENT
Start: 2021-02-18

## 2021-02-18 RX ADMIN — HEPARIN SODIUM (PORCINE) LOCK FLUSH IV SOLN 100 UNIT/ML 500 UNITS: 100 SOLUTION INTRAVENOUS at 10:47:00

## 2021-02-18 NOTE — PROGRESS NOTES
Cancer Center Progress Note    Patient Name: Yue Stovall   YOB: 1970   Medical Record Number: [id number]     Chief Complaint:  Colon cancer    Oncology History:  Lidia Flores is a 47year old female with high risk stage II (pT4aN0) adenoc (six) hours as needed for Pain., Disp: , Rfl:   •  metoprolol Tartrate 25 MG Oral Tab, Take 1 tablet (25 mg total) by mouth 2x Daily(Beta Blocker). , Disp: 30 tablet, Rfl: 0  •  ondansetron 8 MG Oral Tablet Dispersible, Take 1 tablet (8 mg total) by mouth e .0 11/18/2020    .0 09/22/2020       Lab Results   Component Value Date     (H) 11/18/2020    BUN 11 11/18/2020    BUNCREA 17.2 11/18/2020    CREATSERUM 0.64 11/18/2020    ANIONGAP 6 11/18/2020    GFRNAA 104 11/18/2020    GFRAA 119 11/ phos mildly up still but good trend, continue to monitor for now    chemo-induced peripheral neuropathy  -Improving grade 1 however keeping her up at nights.   Try Cymbalta 30 mg at night     Patchy facial rash in setting of known psoriasis  -Referral to de

## 2021-02-27 ENCOUNTER — TELEPHONE (OUTPATIENT)
Dept: GASTROENTEROLOGY | Facility: CLINIC | Age: 51
End: 2021-02-27

## 2021-02-27 NOTE — TELEPHONE ENCOUNTER
Author: Baljit Roa MD Service: Benedict Duval Type: Physician   Filed: 2/26/2021  7:44 PM Encounter Date: 2/18/2021 Status: Signed   : Baljit Roa MD (Physician)   Thanks  Berenice Alvarado, we will do so.     GI RNs: Please contact the patient

## 2021-02-27 NOTE — PROGRESS NOTES
Thanks  Jame morejon, we will do so. GI RNs: Please contact the patient to schedule a colonoscopy for a personal history of a colon cancer. Split dose TriLyte preparation. Hold diabetic medication the evening before and the morning of the procedure.

## 2021-03-08 NOTE — TELEPHONE ENCOUNTER
CB, unable to LM to schedule procedure, no VM box available. [employer] message sent. If pt returns call, please transfer to [facility] at ext [phone] for scheduling.

## 2021-03-18 DIAGNOSIS — Z23 NEED FOR VACCINATION: ICD-10-CM

## 2021-03-26 NOTE — TELEPHONE ENCOUNTER
Tried calling patient again. Rang twice and stated there is no voice mail box set up to leave message. No response letter mailed out since there is no way to leave message for patient.

## 2021-05-26 ENCOUNTER — HOSPITAL ENCOUNTER (OUTPATIENT)
Dept: CT IMAGING | Facility: HOSPITAL | Age: 51
Discharge: HOME | End: 2021-05-26
Attending: INTERNAL MEDICINE
Payer: COMMERCIAL

## 2021-05-26 ENCOUNTER — TELEPHONE (OUTPATIENT)
Dept: HEMATOLOGY/ONCOLOGY | Facility: HOSPITAL | Age: 51
End: 2021-05-26

## 2021-05-26 DIAGNOSIS — C18.2 MALIGNANT NEOPLASM OF ASCENDING COLON (HCC): ICD-10-CM

## 2021-05-26 PROCEDURE — 82565 ASSAY OF CREATININE: CPT

## 2021-05-26 PROCEDURE — 71260 CT THORAX DX C+: CPT | Performed by: INTERNAL MEDICINE

## 2021-05-26 PROCEDURE — 74177 CT ABD & PELVIS W/CONTRAST: CPT | Performed by: INTERNAL MEDICINE

## 2021-05-26 NOTE — TELEPHONE ENCOUNTER
I spoke with Jojo Borges and let her know that the CT showed no evidence of disease. We will follow up with her with labs in June. Jojo Borges requested a refill on the cymbalta. She states it was really helping her but she ran out with no refills.   I will se

## 2021-06-02 ENCOUNTER — TELEPHONE (OUTPATIENT)
Dept: GASTROENTEROLOGY | Facility: CLINIC | Age: 51
End: 2021-06-02

## 2021-06-02 ENCOUNTER — OFFICE VISIT (OUTPATIENT)
Dept: SURGERY | Facility: CLINIC | Age: 51
End: 2021-06-02
Payer: COMMERCIAL

## 2021-06-02 VITALS
WEIGHT: 146 LBS | SYSTOLIC BLOOD PRESSURE: 132 MMHG | RESPIRATION RATE: 16 BRPM | HEIGHT: 56 IN | OXYGEN SATURATION: 96 % | DIASTOLIC BLOOD PRESSURE: 85 MMHG | BODY MASS INDEX: 32.84 KG/M2 | HEART RATE: 88 BPM

## 2021-06-02 DIAGNOSIS — Z80.0 FAMILY HISTORY OF COLON CANCER: Primary | ICD-10-CM

## 2021-06-02 DIAGNOSIS — C18.2 MALIGNANT NEOPLASM OF ASCENDING COLON (HCC): Primary | ICD-10-CM

## 2021-06-02 PROCEDURE — 3079F DIAST BP 80-89 MM HG: CPT | Performed by: SURGERY

## 2021-06-02 PROCEDURE — 3008F BODY MASS INDEX DOCD: CPT | Performed by: SURGERY

## 2021-06-02 PROCEDURE — 99215 OFFICE O/P EST HI 40 MIN: CPT | Performed by: SURGERY

## 2021-06-02 PROCEDURE — 3075F SYST BP GE 130 - 139MM HG: CPT | Performed by: SURGERY

## 2021-06-02 NOTE — PROGRESS NOTES
[facility]    Patient Name:  Radha Ledesma   YOB: 1970   Gender:  Female   Appt Date:  6/2/2021   Provider:  Marcelo Mari MD   Insurance:       PATIENT PROVIDERS  Referring Provider: Dr. Serena Lewis 7/13/2020-9/23/2020. Patient returns today for follow-up. She is doing well. Denies weight loss, abdominal pain, changes in bowel habits. Recent CT on 5/26/2021 showed no evidence of recurrence. Due for colonoscopy.      Vital Signs:  /85 (BP Locati status: Never Smoker      Smokeless tobacco: Never Used    Substance and Sexual Activity      Alcohol use:  Yes        Alcohol/week: 1.0 standard drinks        Types: 1 Cans of beer per week        Comment: occ      Drug use: Never     Reviewed:  Family His murmur heard. Pulmonary/Chest: Effort normal and breath sounds normal. No respiratory distress. She has no wheezes. She has no rales. Abdominal: Soft. She exhibits no distension. There is no abdominal tenderness.        Neurological: She is alert and lori supporting the epithelial origin of the tumor cells.     For  purposes, this case was reviewed by a second pathologist who concurred with the diagnosis.   Dr. Eliza Cesar has been notified with the above findings on 6-8-2020 at 11.50 a.m.    intramucosal adenocarcinoma, and adenoma    Margins Examined  Proximal      Distal      Radial or Mesenteric    Distance of Invasive Carcinoma from Closest Margin  3.5 cm   Closest Margin  Proximal    LYMPH NODES   Number of Lymph Nodes Involved  0    Numb folded. No other lesions or masses are grossly identified. The appendix is grossly unremarkable. Dissection of the pericolonic fat reveals twenty-nine lymph node candidates ranging from 0.2 to 0.8 cm in greatest dimension.    Representative sections are sub no significant inflammatory changes surrounding the anastomotic site; nonenlarged   right lower quadrant mesenteric lymph nodes, which are likely benign/reactive; distal predominant colonic diverticulosis.  Appendix is presumably surgically absent.    Nena Walters Arben taking over surveillance. She is doing well from a post operative standpoint. CT without evidence of recurrence. Per guidelines, patient is due to complete colonoscopy soon [1 year nikolai].  Patient states that she will call tomorrow for appointment

## 2021-06-03 NOTE — TELEPHONE ENCOUNTER
Schedulers:  Patient calling to schedule colonoscopy. Please see orders from 2/27/2021 encounter.       Author: Roger Alcaraz MD Service: Denise Pfeiffer Type: Physician   Filed: 2/26/2021  7:44 PM Encounter Date: 2/18/2021 Status: Signed   : Corrinne Mace

## 2021-06-17 ENCOUNTER — OFFICE VISIT (OUTPATIENT)
Dept: HEMATOLOGY/ONCOLOGY | Facility: HOSPITAL | Age: 51
End: 2021-06-17
Attending: INTERNAL MEDICINE
Payer: COMMERCIAL

## 2021-06-17 VITALS
TEMPERATURE: 98 F | HEART RATE: 76 BPM | BODY MASS INDEX: 32.39 KG/M2 | OXYGEN SATURATION: 100 % | SYSTOLIC BLOOD PRESSURE: 151 MMHG | DIASTOLIC BLOOD PRESSURE: 90 MMHG | HEIGHT: 56 IN | WEIGHT: 144 LBS | RESPIRATION RATE: 16 BRPM

## 2021-06-17 DIAGNOSIS — E87.6 HYPOKALEMIA: Primary | ICD-10-CM

## 2021-06-17 DIAGNOSIS — C18.9 MALIGNANT NEOPLASM OF COLON, UNSPECIFIED PART OF COLON (HCC): Primary | ICD-10-CM

## 2021-06-17 DIAGNOSIS — R11.12 PROJECTILE VOMITING WITH NAUSEA: ICD-10-CM

## 2021-06-17 DIAGNOSIS — E86.1 HYPOVOLEMIA: ICD-10-CM

## 2021-06-17 DIAGNOSIS — D50.0 IRON DEFICIENCY ANEMIA DUE TO CHRONIC BLOOD LOSS: ICD-10-CM

## 2021-06-17 DIAGNOSIS — G60.8 COLD INDUCED NEUROPATHY: ICD-10-CM

## 2021-06-17 DIAGNOSIS — C18.2 MALIGNANT NEOPLASM OF ASCENDING COLON (HCC): ICD-10-CM

## 2021-06-17 PROCEDURE — 36591 DRAW BLOOD OFF VENOUS DEVICE: CPT

## 2021-06-17 PROCEDURE — 80053 COMPREHEN METABOLIC PANEL: CPT

## 2021-06-17 PROCEDURE — 85025 COMPLETE CBC W/AUTO DIFF WBC: CPT

## 2021-06-17 PROCEDURE — 82378 CARCINOEMBRYONIC ANTIGEN: CPT

## 2021-06-17 PROCEDURE — 99214 OFFICE O/P EST MOD 30 MIN: CPT | Performed by: INTERNAL MEDICINE

## 2021-06-17 RX ORDER — HEPARIN SODIUM (PORCINE) LOCK FLUSH IV SOLN 100 UNIT/ML 100 UNIT/ML
5 SOLUTION INTRAVENOUS ONCE
Status: COMPLETED | OUTPATIENT
Start: 2021-06-17 | End: 2021-06-17

## 2021-06-17 RX ORDER — 0.9 % SODIUM CHLORIDE 0.9 %
10 VIAL (ML) INJECTION ONCE
OUTPATIENT
Start: 2021-06-17

## 2021-06-17 RX ORDER — HEPARIN SODIUM (PORCINE) LOCK FLUSH IV SOLN 100 UNIT/ML 100 UNIT/ML
5 SOLUTION INTRAVENOUS ONCE
OUTPATIENT
Start: 2021-06-17

## 2021-06-17 RX ORDER — SODIUM CHLORIDE 9 MG/ML
INJECTION INTRAVENOUS
Status: DISCONTINUED
Start: 2021-06-17 | End: 2021-06-17

## 2021-06-17 RX ADMIN — HEPARIN SODIUM (PORCINE) LOCK FLUSH IV SOLN 100 UNIT/ML 500 UNITS: 100 SOLUTION INTRAVENOUS at 10:26:00

## 2021-06-17 NOTE — PROGRESS NOTES
Cancer Center Progress Note    Patient Name: Amilcar Beauchamp   YOB: 1970   Medical Record Number: [id number]     Chief Complaint:  Colon cancer    Oncology History:  Jammie Ibrahim is a 47year old female with high risk stage II (pT4aN0) adenoc 2 (two) times daily with meals. , Disp: , Rfl:   •  SITagliptin Phosphate 50 MG Oral Tab, Take 50 mg by mouth daily. , Disp: , Rfl:   •  cetirizine 10 MG Oral Tab, Take 10 mg by mouth as needed for Allergies. , Disp: , Rfl:   •  metoprolol Tartrate 25 MG Oral 02/18/2021    AST 34 02/18/2021    ALT 52 02/18/2021    BILT 0.5 02/18/2021    TP 7.2 02/18/2021    ALB 3.3 (L) 02/18/2021    GLOBULIN 3.9 02/18/2021     02/18/2021    K 3.7 02/18/2021     02/18/2021    CO2 29.0 02/18/2021     CBC and CMP revie

## 2021-06-18 NOTE — TELEPHONE ENCOUNTER
Tried calling patient. Rang 3 times and went silent. No option to leave message.      If patient returns call, can transfer to [address] 6/18/21 at 00913

## 2021-06-18 NOTE — TELEPHONE ENCOUNTER
Schedulers:  Received message yesterday from Dr. Silvia Alonzo requesting we get patient in for a colonoscopy. Please contact patient to schedule per below orders from Dr. Uzair Reese.     Thank you    MD Zhanna Paniagua, JAYLEN Singleton   For this patie

## 2021-07-02 ENCOUNTER — HOSPITAL ENCOUNTER (OUTPATIENT)
Dept: INTERVENTIONAL RADIOLOGY/VASCULAR | Facility: HOSPITAL | Age: 51
Discharge: HOME | End: 2021-07-02
Attending: INTERNAL MEDICINE | Admitting: RADIOLOGY
Payer: COMMERCIAL

## 2021-07-02 VITALS
TEMPERATURE: 98 F | HEIGHT: 58 IN | RESPIRATION RATE: 17 BRPM | OXYGEN SATURATION: 96 % | BODY MASS INDEX: 30.23 KG/M2 | DIASTOLIC BLOOD PRESSURE: 72 MMHG | HEART RATE: 71 BPM | WEIGHT: 144 LBS | SYSTOLIC BLOOD PRESSURE: 120 MMHG

## 2021-07-02 DIAGNOSIS — G60.8 COLD INDUCED NEUROPATHY: ICD-10-CM

## 2021-07-02 DIAGNOSIS — C18.9 MALIGNANT NEOPLASM OF COLON, UNSPECIFIED PART OF COLON (HCC): ICD-10-CM

## 2021-07-02 LAB
GLUCOSE BLDC GLUCOMTR-MCNC: 261 MG/DL (ref 70–99)
SARS-COV-2 RNA RESP QL NAA+PROBE: NOT DETECTED

## 2021-07-02 PROCEDURE — 82962 GLUCOSE BLOOD TEST: CPT

## 2021-07-02 PROCEDURE — 36415 COLL VENOUS BLD VENIPUNCTURE: CPT

## 2021-07-02 PROCEDURE — 36590 REMOVAL TUNNELED CV CATH: CPT

## 2021-07-02 PROCEDURE — 99152 MOD SED SAME PHYS/QHP 5/>YRS: CPT

## 2021-07-02 PROCEDURE — 0JPT0XZ REMOVAL OF TUNNELED VASCULAR ACCESS DEVICE FROM TRUNK SUBCUTANEOUS TISSUE AND FASCIA, OPEN APPROACH: ICD-10-PCS | Performed by: RADIOLOGY

## 2021-07-02 RX ORDER — LIDOCAINE HYDROCHLORIDE 20 MG/ML
INJECTION, SOLUTION EPIDURAL; INFILTRATION; INTRACAUDAL; PERINEURAL
Status: COMPLETED
Start: 2021-07-02 | End: 2021-07-02

## 2021-07-02 RX ORDER — MIDAZOLAM HYDROCHLORIDE 1 MG/ML
INJECTION INTRAMUSCULAR; INTRAVENOUS
Status: COMPLETED
Start: 2021-07-02 | End: 2021-07-02

## 2021-07-02 RX ORDER — SODIUM CHLORIDE 9 MG/ML
INJECTION, SOLUTION INTRAVENOUS CONTINUOUS
Status: DISCONTINUED | OUTPATIENT
Start: 2021-07-02 | End: 2021-07-02

## 2021-07-02 NOTE — INTERVAL H&P NOTE
The above referenced H&P was reviewed by Margarita Mcdowell MD on 7/2/2021, the patient was examined and no significant changes have occurred in the patient's condition since the H&P was performed.   Risks, benefits, alternative treatments and consequences of no

## 2021-07-02 NOTE — IVS NOTE
Patient tolerated procedure well. Denies pain. Incision to right upper chest with dermabond dressing, no bleeding or swelling noted. Tolerated PO juice and crackers. Discharge and follow-up instructions given.  Discharged home per W/C,VSS, right chest site

## 2021-07-14 RX ORDER — POLYETHYLENE GLYCOL-3350 AND ELECTROLYTES 236; 6.74; 5.86; 2.97; 22.74 G/274.31G; G/274.31G; G/274.31G; G/274.31G; G/274.31G
4000 POWDER, FOR SOLUTION ORAL ONCE
Qty: 4000 ML | Refills: 0 | Status: SHIPPED | OUTPATIENT
Start: 2021-07-14 | End: 2021-07-14

## 2021-07-14 NOTE — TELEPHONE ENCOUNTER
Scheduled for:  Colonoscopy 24281  Provider Name:  Dr. Percy Bolanos  Date:  11/8/21  Location:  [facility]  Sedation:  MAC  Time:  8:30am (pt is aware to arrive at 7:30am)  Prep:  Trilyte  Meds/Allergies Reconciled?:  Physician reviewed   Diagnosis with codes:  H

## 2021-07-14 NOTE — TELEPHONE ENCOUNTER
Dr. Erick Martin    This patient is scheduled for a Colonoscopy on 11/8/21 @ [address] [place] [address]     Can you please send prep to the pharmacy?       Thank you

## 2021-11-05 ENCOUNTER — LAB ENCOUNTER (OUTPATIENT)
Dept: LAB | Age: 51
End: 2021-11-05
Attending: INTERNAL MEDICINE
Payer: COMMERCIAL

## 2021-11-05 DIAGNOSIS — Z01.818 PRE-OP TESTING: ICD-10-CM

## 2021-11-08 ENCOUNTER — ANESTHESIA (OUTPATIENT)
Dept: ENDOSCOPY | Facility: HOSPITAL | Age: 51
End: 2021-11-08
Payer: COMMERCIAL

## 2021-11-08 ENCOUNTER — ANESTHESIA EVENT (OUTPATIENT)
Dept: ENDOSCOPY | Facility: HOSPITAL | Age: 51
End: 2021-11-08
Payer: COMMERCIAL

## 2021-11-08 ENCOUNTER — HOSPITAL ENCOUNTER (OUTPATIENT)
Facility: HOSPITAL | Age: 51
Setting detail: HOSPITAL OUTPATIENT SURGERY
Discharge: HOME | End: 2021-11-08
Attending: INTERNAL MEDICINE | Admitting: INTERNAL MEDICINE
Payer: COMMERCIAL

## 2021-11-08 VITALS
TEMPERATURE: 97 F | WEIGHT: 145 LBS | OXYGEN SATURATION: 100 % | DIASTOLIC BLOOD PRESSURE: 67 MMHG | SYSTOLIC BLOOD PRESSURE: 110 MMHG | BODY MASS INDEX: 30.44 KG/M2 | HEIGHT: 58 IN | RESPIRATION RATE: 16 BRPM | HEART RATE: 77 BPM

## 2021-11-08 DIAGNOSIS — Z80.0 FAMILY HISTORY OF COLON CANCER: ICD-10-CM

## 2021-11-08 DIAGNOSIS — K57.90 DIVERTICULOSIS: ICD-10-CM

## 2021-11-08 DIAGNOSIS — Z01.818 PRE-OP TESTING: Primary | ICD-10-CM

## 2021-11-08 PROCEDURE — 45380 COLONOSCOPY AND BIOPSY: CPT | Performed by: INTERNAL MEDICINE

## 2021-11-08 PROCEDURE — 0DBL8ZX EXCISION OF TRANSVERSE COLON, VIA NATURAL OR ARTIFICIAL OPENING ENDOSCOPIC, DIAGNOSTIC: ICD-10-PCS | Performed by: INTERNAL MEDICINE

## 2021-11-08 RX ORDER — LIDOCAINE HYDROCHLORIDE 10 MG/ML
INJECTION, SOLUTION EPIDURAL; INFILTRATION; INTRACAUDAL; PERINEURAL AS NEEDED
Status: DISCONTINUED | OUTPATIENT
Start: 2021-11-08 | End: 2021-11-08 | Stop reason: SURG

## 2021-11-08 RX ORDER — NALOXONE HYDROCHLORIDE 0.4 MG/ML
80 INJECTION, SOLUTION INTRAMUSCULAR; INTRAVENOUS; SUBCUTANEOUS AS NEEDED
Status: DISCONTINUED | OUTPATIENT
Start: 2021-11-08 | End: 2021-11-08

## 2021-11-08 RX ORDER — SODIUM CHLORIDE, SODIUM LACTATE, POTASSIUM CHLORIDE, CALCIUM CHLORIDE 600; 310; 30; 20 MG/100ML; MG/100ML; MG/100ML; MG/100ML
INJECTION, SOLUTION INTRAVENOUS CONTINUOUS
Status: DISCONTINUED | OUTPATIENT
Start: 2021-11-08 | End: 2021-11-08

## 2021-11-08 RX ORDER — SODIUM CHLORIDE 9 MG/ML
INJECTION, SOLUTION INTRAVENOUS CONTINUOUS PRN
Status: DISCONTINUED | OUTPATIENT
Start: 2021-11-08 | End: 2021-11-08 | Stop reason: SURG

## 2021-11-08 RX ORDER — DEXTROSE MONOHYDRATE 25 G/50ML
50 INJECTION, SOLUTION INTRAVENOUS
Status: DISCONTINUED | OUTPATIENT
Start: 2021-11-08 | End: 2021-11-08

## 2021-11-08 RX ADMIN — SODIUM CHLORIDE: 9 INJECTION, SOLUTION INTRAVENOUS at 09:19:00

## 2021-11-08 RX ADMIN — SODIUM CHLORIDE, SODIUM LACTATE, POTASSIUM CHLORIDE, CALCIUM CHLORIDE: 600; 310; 30; 20 INJECTION, SOLUTION INTRAVENOUS at 08:34:00

## 2021-11-08 RX ADMIN — SODIUM CHLORIDE: 9 INJECTION, SOLUTION INTRAVENOUS at 09:00:00

## 2021-11-08 RX ADMIN — LIDOCAINE HYDROCHLORIDE 40 MG: 10 INJECTION, SOLUTION EPIDURAL; INFILTRATION; INTRACAUDAL; PERINEURAL at 08:34:00

## 2021-11-08 NOTE — H&P
History & Physical Examination    Patient Name: Nora Wright  MRN: [id number]  CSN: [id number]  YOB: 1970    Diagnosis: Personal history of colon cancer      acetaminophen 500 MG Oral Tab, Take 500 mg by mouth every 6 (six) hours as needed please explain:   JONNATHAN Abeer.Spine ] [ Nataliia Lopez  Abeer.Spine ] [ Aminah Topete Abeer.Spine ] [ Tatianna Weeks Abeer.Spine ] [ Nabil Steen Abeer.Spine ] [ Aren De Oliveira Abeer.Spine ] [ Evie Range        [ x ] I have discussed the risks and benefits and alternatives with the patient/family.   They Bowie

## 2021-11-08 NOTE — OPERATIVE REPORT
[facility] Endoscopy Report      Date of Procedure:  11/08/21      Preoperative Diagnosis:  Personal history of colon cancer      Postoperative Diagnosis:  1. Diverticulosis left colon  2.   Normal ileocolonic anastomosis      Procedure: diverticula seen in the left colon without current signs of complication. There were no colonic polyps, mass lesions, vascular anomalies or signs of inflammation seen. Retroflexion in the rectum was not possible due to space limitations.   The procedure w

## 2021-11-08 NOTE — ANESTHESIA PREPROCEDURE EVALUATION
Anesthesia PreOp Note    HPI:     Aida Magdaleno is a 45year old female who presents for preoperative consultation requested by: Junito Ayala MD    Date of Surgery: 11/8/2021    Procedure(s):  COLONOSCOPY  Indication: Family history of colon canc , Rfl:   cetirizine 10 MG Oral Tab, Take 10 mg by mouth as needed for Allergies. , Disp: , Rfl:       lactated ringers infusion, , Intravenous, Continuous, Irina Vital MD    No current [facility]-ordered outpatient medications on file.         Codeine Stress : Not on file  Social Connections:       Frequency of Communication with Friends and Family: Not on file      Frequency of Social Gatherings with Friends and Family: Not on file      Attends [employer] Services: Not on file      Active Member of [employer] planned.   Adrián Matthew DO  11/8/2021 8:07 AM

## 2021-11-08 NOTE — ANESTHESIA POSTPROCEDURE EVALUATION
Patient: Nicolasa Soto    Procedure Summary     Date: 11/08/21 Room / Location: [address] [place] [address] ENDOSCOPY 04 / [address] [place] [address] ENDOSCOPY    Anesthesia Start: 5040 Anesthesia Stop: 1244    Procedure: COLONOSCOPY (N/A ) Diagnosis:       Family history of colon cancer      (diverti

## 2021-11-10 ENCOUNTER — TELEPHONE (OUTPATIENT)
Dept: GASTROENTEROLOGY | Facility: CLINIC | Age: 51
End: 2021-11-10

## 2021-11-10 NOTE — TELEPHONE ENCOUNTER
----- Message from Iva Meléndez MD sent at 11/9/2021  6:04 PM CST -----  I spoke to Van Zandt.  She is feeling well. The anastomotic biopsies were normal.  No malignancy or dysplasia. Uncomplicated diverticulosis was present.   I have recommended a h

## 2021-11-10 NOTE — TELEPHONE ENCOUNTER
Entered into Epic. Recall CLN in 3 years per Dr. Yunior Garcia. Last CLN done 11/08/2021. Recall entered into Patient Outreach for 11/08/2024. HM updated.

## 2021-12-20 ENCOUNTER — TELEPHONE (OUTPATIENT)
Dept: HEMATOLOGY/ONCOLOGY | Facility: HOSPITAL | Age: 51
End: 2021-12-20

## 2021-12-21 ENCOUNTER — TELEPHONE (OUTPATIENT)
Dept: HEMATOLOGY/ONCOLOGY | Facility: HOSPITAL | Age: 51
End: 2021-12-21

## 2021-12-23 ENCOUNTER — TELEPHONE (OUTPATIENT)
Dept: HEMATOLOGY/ONCOLOGY | Facility: HOSPITAL | Age: 51
End: 2021-12-23

## 2022-01-10 ENCOUNTER — APPOINTMENT (OUTPATIENT)
Dept: HEMATOLOGY/ONCOLOGY | Facility: HOSPITAL | Age: 52
End: 2022-01-10
Attending: INTERNAL MEDICINE
Payer: COMMERCIAL

## 2024-08-15 ENCOUNTER — TELEPHONE (OUTPATIENT)
Facility: CLINIC | Age: 54
End: 2024-08-15

## 2024-08-15 NOTE — TELEPHONE ENCOUNTER
Patient outreach message received:    Recall CLN in 3 years per Dr. Vital. Last CLN done 11/08/2021. Recall entered into Patient Outreach for 11/08/2024     Recall reminder letter mailed out to patient.

## 2024-10-23 ENCOUNTER — OFFICE VISIT (OUTPATIENT)
Dept: INTERNAL MEDICINE CLINIC | Facility: CLINIC | Age: 54
End: 2024-10-23

## 2024-10-23 ENCOUNTER — TELEPHONE (OUTPATIENT)
Facility: CLINIC | Age: 54
End: 2024-10-23

## 2024-10-23 ENCOUNTER — TELEPHONE (OUTPATIENT)
Age: 54
End: 2024-10-23

## 2024-10-23 VITALS
OXYGEN SATURATION: 100 % | WEIGHT: 135 LBS | DIASTOLIC BLOOD PRESSURE: 85 MMHG | HEART RATE: 80 BPM | SYSTOLIC BLOOD PRESSURE: 135 MMHG | BODY MASS INDEX: 28.34 KG/M2 | TEMPERATURE: 98 F | HEIGHT: 58 IN

## 2024-10-23 DIAGNOSIS — Z00.00 ANNUAL PHYSICAL EXAM: ICD-10-CM

## 2024-10-23 DIAGNOSIS — Z85.038 PERSONAL HISTORY OF COLON CANCER: Primary | ICD-10-CM

## 2024-10-23 DIAGNOSIS — Z01.419 ENCOUNTER FOR WELL WOMAN EXAM WITH ROUTINE GYNECOLOGICAL EXAM: ICD-10-CM

## 2024-10-23 DIAGNOSIS — Z12.31 ENCOUNTER FOR SCREENING MAMMOGRAM FOR MALIGNANT NEOPLASM OF BREAST: ICD-10-CM

## 2024-10-23 DIAGNOSIS — C18.2 MALIGNANT NEOPLASM OF ASCENDING COLON (HCC): Primary | ICD-10-CM

## 2024-10-23 DIAGNOSIS — E11.00 TYPE 2 DIABETES MELLITUS WITH HYPEROSMOLARITY WITHOUT COMA, WITHOUT LONG-TERM CURRENT USE OF INSULIN (HCC): ICD-10-CM

## 2024-10-23 PROBLEM — E78.2 MIXED HYPERLIPIDEMIA: Status: ACTIVE | Noted: 2020-03-20

## 2024-10-23 PROBLEM — J30.2 SEASONAL ALLERGIES: Status: ACTIVE | Noted: 2024-10-23

## 2024-10-23 LAB
ALBUMIN SERPL-MCNC: 4.5 G/DL (ref 3.2–4.8)
ALBUMIN/GLOB SERPL: 1.4 {RATIO} (ref 1–2)
ALP LIVER SERPL-CCNC: 148 U/L
ALT SERPL-CCNC: 35 U/L
ANION GAP SERPL CALC-SCNC: 7 MMOL/L (ref 0–18)
AST SERPL-CCNC: 29 U/L (ref ?–34)
BASOPHILS # BLD AUTO: 0.04 X10(3) UL (ref 0–0.2)
BASOPHILS NFR BLD AUTO: 0.7 %
BILIRUB SERPL-MCNC: 0.7 MG/DL (ref 0.3–1.2)
BUN BLD-MCNC: 13 MG/DL (ref 9–23)
BUN/CREAT SERPL: 16 (ref 10–20)
CALCIUM BLD-MCNC: 9.6 MG/DL (ref 8.7–10.4)
CEA SERPL-MCNC: 3.6 NG/ML (ref ?–5)
CHLORIDE SERPL-SCNC: 104 MMOL/L (ref 98–112)
CHOLEST SERPL-MCNC: 225 MG/DL (ref ?–200)
CO2 SERPL-SCNC: 26 MMOL/L (ref 21–32)
CREAT BLD-MCNC: 0.81 MG/DL
CREAT UR-SCNC: 61.1 MG/DL
DEPRECATED RDW RBC AUTO: 38.6 FL (ref 35.1–46.3)
EGFRCR SERPLBLD CKD-EPI 2021: 86 ML/MIN/1.73M2 (ref 60–?)
EOSINOPHIL # BLD AUTO: 0.06 X10(3) UL (ref 0–0.7)
EOSINOPHIL NFR BLD AUTO: 1.1 %
ERYTHROCYTE [DISTWIDTH] IN BLOOD BY AUTOMATED COUNT: 12.5 % (ref 11–15)
EST. AVERAGE GLUCOSE BLD GHB EST-MCNC: 306 MG/DL (ref 68–126)
FASTING PATIENT LIPID ANSWER: YES
FASTING STATUS PATIENT QL REPORTED: YES
GLOBULIN PLAS-MCNC: 3.2 G/DL (ref 2–3.5)
GLUCOSE BLD-MCNC: 276 MG/DL (ref 70–99)
HBA1C MFR BLD: 12.3 % (ref ?–5.7)
HCT VFR BLD AUTO: 43.7 %
HDLC SERPL-MCNC: 55 MG/DL (ref 40–59)
HGB BLD-MCNC: 14.4 G/DL
IMM GRANULOCYTES # BLD AUTO: 0.01 X10(3) UL (ref 0–1)
IMM GRANULOCYTES NFR BLD: 0.2 %
LDLC SERPL CALC-MCNC: 139 MG/DL (ref ?–100)
LYMPHOCYTES # BLD AUTO: 1.95 X10(3) UL (ref 1–4)
LYMPHOCYTES NFR BLD AUTO: 34.4 %
MCH RBC QN AUTO: 27.9 PG (ref 26–34)
MCHC RBC AUTO-ENTMCNC: 33 G/DL (ref 31–37)
MCV RBC AUTO: 84.7 FL
MICROALBUMIN UR-MCNC: 1.3 MG/DL
MICROALBUMIN/CREAT 24H UR-RTO: 21.3 UG/MG (ref ?–30)
MONOCYTES # BLD AUTO: 0.29 X10(3) UL (ref 0.1–1)
MONOCYTES NFR BLD AUTO: 5.1 %
NEUTROPHILS # BLD AUTO: 3.32 X10 (3) UL (ref 1.5–7.7)
NEUTROPHILS # BLD AUTO: 3.32 X10(3) UL (ref 1.5–7.7)
NEUTROPHILS NFR BLD AUTO: 58.5 %
NONHDLC SERPL-MCNC: 170 MG/DL (ref ?–130)
OSMOLALITY SERPL CALC.SUM OF ELEC: 294 MOSM/KG (ref 275–295)
PLATELET # BLD AUTO: 290 10(3)UL (ref 150–450)
POTASSIUM SERPL-SCNC: 4.1 MMOL/L (ref 3.5–5.1)
PROT SERPL-MCNC: 7.7 G/DL (ref 5.7–8.2)
RBC # BLD AUTO: 5.16 X10(6)UL
SODIUM SERPL-SCNC: 137 MMOL/L (ref 136–145)
TRIGL SERPL-MCNC: 172 MG/DL (ref 30–149)
TSI SER-ACNC: 2.18 MIU/ML (ref 0.55–4.78)
VLDLC SERPL CALC-MCNC: 32 MG/DL (ref 0–30)
WBC # BLD AUTO: 5.7 X10(3) UL (ref 4–11)

## 2024-10-23 PROCEDURE — 36415 COLL VENOUS BLD VENIPUNCTURE: CPT | Performed by: INTERNAL MEDICINE

## 2024-10-23 PROCEDURE — 99204 OFFICE O/P NEW MOD 45 MIN: CPT | Performed by: INTERNAL MEDICINE

## 2024-10-23 RX ORDER — METOPROLOL TARTRATE 25 MG/1
25 TABLET, FILM COATED ORAL
Qty: 180 TABLET | Refills: 1 | Status: SHIPPED | OUTPATIENT
Start: 2024-10-23

## 2024-10-23 RX ORDER — POLYETHYLENE GLYCOL 3350, SODIUM CHLORIDE, SODIUM BICARBONATE, POTASSIUM CHLORIDE 420; 11.2; 5.72; 1.48 G/4L; G/4L; G/4L; G/4L
POWDER, FOR SOLUTION ORAL
Qty: 1 EACH | Refills: 0 | Status: SHIPPED | OUTPATIENT
Start: 2024-10-23

## 2024-10-23 NOTE — TELEPHONE ENCOUNTER
Dr Kulkarni  Called patient for a CLN recall,date of birth and name verified.  Medications, pharmacy, and allergies reviewed.   Please advise on colonoscopy and bowel prep orders.     › Insurance:  [facility]  › Last PCP visit:  10/23/2024  › Last CBC/CMP: 6/17/2021  › H/W/BMI: 4'10/135 lbs/ 28    Special comments/notes:  Recall    Last Procedure, Date, MD:    11/8/2021 CLN by Dr DRAKE   Last diagnosis:  No dysplasia or malignancy, diverticulosis   Recalled for (mth/yrs):  3 years (due November 2024)   Sedation used previously:  MAC   Last Prep Used:  gavilyte   Quality of Prep:  Very good     Telephone Colon Screening Questionnaire Yes No   Are you currently experiencing any new/abnormal GI symptoms? [] [x]   If yes, explain:     Rectal bleeding? [] [x]   Black stool? [] [x]   Dysphagia or food \"feeling stuck\" when eating? [] [x]   Intractable vomiting? [] [x]   Unexplained weight loss? [] [x]   First colonoscopy? [] [x]   Family history of colon cancer? Aunt had colon cancer maternal side [x] []   Any issues with anesthesia? [] [x]   If yes, explain:      Any recent complaints of chest pain or shortness of breath?  [] [x]   Referred to a cardiologist?  [] [x]   If yes, explain:      Stroke, MI (heart attack) or stent placement in the last 12 months:  [] [x]   History of  respiratory issues/oxygen/EVERT/COPD: [] [x]   If yes, CPAP/BiPAP:     History of devices (pacemaker/defibrillator) [] [x]   History of cardiac/CVA/(MI/stroke): [] [x]     Medications Yes  No   Anticoagulants (except Aspirin):  [] [x]   Diabetic Meds  PO: Hold day before and day of procedure  Insulin:  [] [x]   Weight loss meds (phentermine/vyvanse/saxsenda/etc): [] [x]   Iron/herbal/multivitamin supplement: [] [x]   Usage of marijuana, CBD &/or vape products: [] [x]        Shad Vital MD   Physician  Gastroenterology     Operative Report     Signed     Date of Service: 11/8/2021  8:38 AM  Case Time: Procedures: Surgeons:   11/8/2021  8:50 AM  COLONOSCOPY    Shad Vital MD               Signed         [facility] Endoscopy Report        Date of Procedure:  11/08/21        Preoperative Diagnosis:  Personal history of colon cancer        Postoperative Diagnosis:  1.  Diverticulosis left colon  2.  Normal ileocolonic anastomosis        Procedure:    Colonoscopy with biopsy        Surgeon:  Shad Vital M.D.        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  She underwent adjuvant FOLFOX chemotherapy.  The patient has been asymptomatic without signs of recurrent disease.  She presents for a surveillance colonoscopy at this time.        Technique:  After informed consent, the patient was placed in the left lateral recumbent position.  Digital rectal examination revealed no palpable intraluminal abnormalities.  An Olympus variable stiffness 190 series HD colonoscope was inserted into the rectum and advanced under direct vision by following the lumen to the ileocolonic anastomosis and into the nunu-terminal ileum.  The colon was examined upon withdrawal in the left lateral recumbent position.       Findings:  The preparation of the colon was very good.  There was evidence of a prior right hemicolectomy with a widely patent and normal-appearing side to side ileocolonic anastomosis.  The nunu-terminal ileum was examined for several centimeters and visually normal.  Lymphoid hyperplasia was present.  Multiple biopsies of the anastomosis were obtained (a tiny adjacent 2 mm polyp cannot be excluded and was included in the specimen).  The visualized colonic mucosa from the  anastomosis to the anal verge was normal with an intact vascular pattern.  There were multiple diverticula seen in the left colon without current signs of complication.  There were no colonic polyps, mass lesions, vascular anomalies or signs of inflammation seen.  Retroflexion in the rectum was not possible due to space limitations.  The procedure was well tolerated without immediate complication.        Impression:  1.  Diverticulosis left colon, currently uncomplicated  2.  Otherwise normal colonoscopy to the nnuu-terminal ileum with a normal-appearing ileocolonic anastomosis     Recommendations:  1.  Follow-up biopsy results.  2.  Will discuss colonoscopic surveillance intervals with Dr. Theodore (2 versus 3 years).                  Electronically signed by Shad Vital MD at 11/8/2021  9:18 AM           Shad Vital MD  11/9/2021  6:04 PM CST       I spoke to Andria.  She is feeling well.  The anastomotic biopsies were normal.  No malignancy or dysplasia.  Uncomplicated diverticulosis was present.  I have recommended a high-fiber diet for diverticulosis and a surveillance colonoscopy in 3 years (discussed with Dr. Theodore).    GI RNs: Please enter colonoscopy recall for 3 years.            Specimen to Pathology Tissue: [id number]  Order: [id number]   Collected 11/8/2021  8:59 AM       Status: Final result       Visible to patient: Yes (not seen)       Dx: Family history of colon cancer    2 Result Notes       1 Follow-up Encounter      Component  Ref Range & Units    Case Report   Surgical Pathology                                Case: [id number]                                   Authorizing Provider:  Shad Vital MD   Collected:           11/08/2021 08:59 AM           Ordering Location:     [facility]          Received:            11/08/2021 09:29 AM                                  [facility]                                                         Pathologist:            Evens Brownlee MD                                                         Specimen:    Colon transverse, biopsy of anastomosis                                                    Final Diagnosis:      Transverse colon anastomosis; biopsy:   Fragments of small intestinal and colonic mucosa.  No dysplasia or malignancy identified.

## 2024-10-23 NOTE — TELEPHONE ENCOUNTER
Pt is calling to schedule a new consult appt.    New Consult- Dr. Tyson  Referred by- Carmelita Conroy  Reason- Malignant neoplasm of ascending colon  Insurance-[employer]    Please give pt a call back. Thank you.

## 2024-10-23 NOTE — PROGRESS NOTES
Subjective:     Patient ID: Andria Payton is a 54 year old female.    HPI    History/Other: She came in today to establish care with new physician.  According to the patient she has not been seen by a doctor for few years.  She has history of colon cancer her last visit with GI was in 2021 as well as oncology was in 2021.  She did not follow-up after that.    Her last mammogram was also a few years ago.  She states that currently she is not taking any medication.  She does have history of diabetes and blood pressure but she is not monitoring at home and she is not taking any medication  Review of Systems   Constitutional: Negative.    HENT: Negative.     Eyes: Negative.    Respiratory: Negative.     Cardiovascular: Negative.    Gastrointestinal: Negative.    Endocrine: Negative.    Genitourinary: Negative.    Musculoskeletal: Negative.    Skin: Negative.    Neurological: Negative.    Hematological: Negative.    Psychiatric/Behavioral: Negative.       Current Outpatient Medications   Medication Sig Dispense Refill    cetirizine 10 MG Oral Tab Take 1 tablet (10 mg total) by mouth as needed for Allergies.      metoprolol Tartrate 25 MG Oral Tab Take 1 tablet (25 mg total) by mouth 2x Daily(Beta Blocker). (Patient not taking: Reported on 10/23/2024) 30 tablet 3    acetaminophen 500 MG Oral Tab Take 500 mg by mouth every 6 (six) hours as needed for Pain. (Patient not taking: Reported on 10/23/2024)      metFORMIN HCl 500 MG Oral Tab Take 500 mg by mouth 2 (two) times daily with meals. (Patient not taking: Reported on 10/23/2024)      SITagliptin Phosphate 50 MG Oral Tab Take 50 mg by mouth daily. (Patient not taking: Reported on 10/23/2024)       Allergies:Allergies[1]    Past Medical History:    Cancer (HCC)    Diabetes (HCC)    High blood pressure      Past Surgical History:   Procedure Laterality Date    Cholecystectomy      Colonoscopy N/A 6/1/2020    Procedure: COLONOSCOPY;  Surgeon: Shda Vital MD;   Location: [facility] ENDOSCOPY    Colonoscopy      Colonoscopy N/A 11/8/2021    Procedure: COLONOSCOPY;  Surgeon: Shad Vital MD;  Location: [facility] ENDOSCOPY      Family History   Problem Relation Age of Onset    Diabetes Father     Hypertension Mother     Thyroid Disorder Mother     Cancer Maternal Grandmother 87        stomach ca, non-smoker    Colon Cancer Maternal Aunt 72    Cancer Maternal Cousin Female          unknown primary (dtr of aunt w/colon ca)    Cancer Maternal Cousin Female 59        stomach ca (dtr of aunt w/colon ca)    Cancer Niece 2        leukemia    Genetic Disease Niece         hemifacial microsomia    Genetic Disease Niece         hemifacial microsomia      Social History:   Social History     Socioeconomic History    Marital status:    Tobacco Use    Smoking status: Never     Passive exposure: Never    Smokeless tobacco: Never   Vaping Use    Vaping status: Never Used   Substance and Sexual Activity    Alcohol use: Yes     Alcohol/week: 1.0 standard drink of alcohol     Types: 1 Cans of beer per week     Comment: occ    Drug use: Never    Sexual activity: Yes     Partners: Male     Social Drivers of Health      [facility]    Social Connections    Received from [facility], [facility]    Housing Stability        Objective:   Physical Exam  Vitals and nursing note reviewed.   Constitutional:       Appearance: Normal appearance.   HENT:      Head: Normocephalic and atraumatic.   Cardiovascular:      Rate and Rhythm: Normal rate and regular rhythm.      Pulses: Normal pulses.      Heart sounds: Normal heart sounds.   Pulmonary:      Effort: Pulmonary effort is normal.      Breath sounds: Normal breath sounds.   Abdominal:      Palpations: Abdomen is soft.   Musculoskeletal:         General: Normal range of motion.      Cervical back: Normal range of motion and neck supple.   Skin:      General: Skin is warm.   Neurological:      Mental Status: She is alert. Mental status is at baseline.   Psychiatric:         Mood and Affect: Mood normal.       Bilateral barefoot skin diabetic exam is normal, visualized feet and the appearance is normal.  Bilateral monofilament/sensation of both feet is normal.  Pulsation pedal pulse exam of both lower legs/feet is normal as well.     Assessment & Plan:   No diagnosis found.  Diabetes type 2 will check hemoglobin A1c will check BMP urine microalbumin referral for ophthalmology, once we have results from HbA1c I will resume her medication    Hypertension monitor blood pressure at home watch diet avoid salty food will resume her metoprolol  History of colon cancer I will check CEA as she needs surveillance colonoscopy I will refer to see GI and referral for new oncology  Screening for breast cancer referral for mammogram  Blood work  Flu shot  Referral for OB    No orders of the defined types were placed in this encounter.      Meds This Visit:  Requested Prescriptions      No prescriptions requested or ordered in this encounter       Imaging & Referrals:  None            [1]   Allergies  Allergen Reactions    Codeine HIVES and NAUSEA ONLY

## 2024-10-23 NOTE — TELEPHONE ENCOUNTER
May schedule a colonoscopy for a personal history of colon cancer following a Gavilyte preparation and monitored anesthesia care.

## 2024-10-24 ENCOUNTER — IMMUNIZATION (OUTPATIENT)
Dept: LAB | Age: 54
End: 2024-10-24
Attending: EMERGENCY MEDICINE
Payer: COMMERCIAL

## 2024-10-24 DIAGNOSIS — Z23 NEED FOR VACCINATION: Primary | ICD-10-CM

## 2024-10-24 PROCEDURE — 90471 IMMUNIZATION ADMIN: CPT

## 2024-10-24 PROCEDURE — 90480 ADMN SARSCOV2 VAC 1/ONLY CMP: CPT

## 2024-10-24 PROCEDURE — 90656 IIV3 VACC NO PRSV 0.5 ML IM: CPT

## 2024-10-24 NOTE — TELEPHONE ENCOUNTER
Scheduled for:  Colonoscopy [id number]  Provider Name:  Dr. Vital  Date:  2/14/2025  Location:  [facility]  Sedation:  MAC  Time:  9:45am, pt is aware eosc will call with arrival time  Prep:  Golytely   Meds/Allergies Reconciled?:  Physician reviewed     Diagnosis with codes:  personal history of colon cancer Z85.038  Was patient informed to call insurance with codes (Y/N):  Yes     Referral sent?:  Referral was sent at the time of electronic surgical scheduling.   [facility] or [facility] notified?:  I sent an electronic request to Endo Scheduling and received a confirmation today.      Medication Orders:  Hold metformin the day before and day of procedure, hold Jardiance for 4 days prior to procedure   Misc Orders:  n/a     Further instructions given by staff:   I discussed the prep instructions with the patient which she verbally understood and is aware that I will send the instructions today.

## 2024-10-28 ENCOUNTER — TELEPHONE (OUTPATIENT)
Dept: CASE MANAGEMENT | Age: 54
End: 2024-10-28

## 2024-10-28 NOTE — TELEPHONE ENCOUNTER
Good Afternoon    [facility] HMO/ DOS 2/14/25    [facility] requires an authorized referral be obtained for ALL specialists visits.    There is not an authorized referral on file on [facility] portal.    [facility] is unable to process PA for upcoming procedure without an authorized referral to Gastro from PCP Albertina Castillo.    Dr Albertina Castillo is a non [facility] physician so  is unable to obtain PA online on [facility] portal.    Please remind GI  staff that all [facility] require an authorized referral for ALL specialists visits.    I will send Shutl message to patient advising to reach out to PCP for authorized referral.     Thank you    Dalia  [facility]

## 2024-10-28 NOTE — TELEPHONE ENCOUNTER
MC: Pt states her PCP is Carmelita Conroy. Instructed pt to call her insurance company to inform them that her PCP is Dr. Conroy. Pt states she already did that via the portal but she will call them again.

## 2024-11-04 ENCOUNTER — OFFICE VISIT (OUTPATIENT)
Dept: HEMATOLOGY/ONCOLOGY | Facility: HOSPITAL | Age: 54
End: 2024-11-04
Attending: INTERNAL MEDICINE
Payer: COMMERCIAL

## 2024-11-04 VITALS
DIASTOLIC BLOOD PRESSURE: 82 MMHG | SYSTOLIC BLOOD PRESSURE: 137 MMHG | HEART RATE: 75 BPM | RESPIRATION RATE: 16 BRPM | OXYGEN SATURATION: 100 % | TEMPERATURE: 98 F | WEIGHT: 135.63 LBS | BODY MASS INDEX: 30.51 KG/M2 | HEIGHT: 56 IN

## 2024-11-04 DIAGNOSIS — T45.1X5A CHEMOTHERAPY-INDUCED NEUROPATHY (HCC): ICD-10-CM

## 2024-11-04 DIAGNOSIS — Z08 ENCOUNTER FOR FOLLOW-UP SURVEILLANCE OF COLON CANCER: Primary | ICD-10-CM

## 2024-11-04 DIAGNOSIS — Z80.9 FAMILY HISTORY OF CANCER: ICD-10-CM

## 2024-11-04 DIAGNOSIS — Z85.038 ENCOUNTER FOR FOLLOW-UP SURVEILLANCE OF COLON CANCER: Primary | ICD-10-CM

## 2024-11-04 DIAGNOSIS — G62.0 CHEMOTHERAPY-INDUCED NEUROPATHY (HCC): ICD-10-CM

## 2024-11-04 PROCEDURE — 99211 OFF/OP EST MAY X REQ PHY/QHP: CPT

## 2024-11-04 NOTE — PROGRESS NOTES
Cancer Center Consultation Note    Patient Name: Andria Payton   YOB: 1970   Medical Record Number: [id number]     Chief Complaint:  History of Colon cancer    Oncology History:  Andria Payton is a 49 year old female with high risk stage II (pT4aN0) adenocarcinoma of the right colon Status post resection 6/5/2020 ( Dr Frazier), MSI-H s/p adjuvant FOLFOX July 2020-9/23/20 given by Dr. Theodore.    She has had issues with chemotherapy-induced nausea vomiting as well as anemia on treatment. Hospitalization for Chemo-induced N/V after C1 and C3. Course was complicated by grade 2 neuropathy as well.    Interval History:  Patient returns to oncology to establish care with me today. Andria reports she was w/o insurance coverage and then procrastinated about keeping up with surveillance. She has not been seen in our office since her last visit with Dr. Theodore in 6/2021. Patient reports feeling well. She denies any systemic signs of illness. No reports of blood loss from any orifice. No new concerns on ROS.      Past Medical History:  Past Medical History:    Colon cancer (HCC)    Diabetes (HCC)    High blood pressure       Past Surgical History:  Past Surgical History:   Procedure Laterality Date    Cholecystectomy      Colonoscopy N/A 06/01/2020    Procedure: COLONOSCOPY;  Surgeon: Shad Vital MD;  Location: [facility] ENDOSCOPY    Colonoscopy N/A 11/08/2021    Procedure: COLONOSCOPY;  Surgeon: Shad Vital MD;  Location: [facility] ENDOSCOPY       Family History:  Family History   Problem Relation Age of Onset    Diabetes Father     Hypertension Mother     Thyroid Disorder Mother     Cancer Maternal Grandmother 87        stomach ca, non-smoker    Colon Cancer Maternal Aunt 72    Cancer Maternal Cousin Female          unknown primary (dtr of aunt w/colon ca)    Cancer Maternal Cousin Female 59        stomach ca (dtr of aunt w/colon ca)    Cancer Niece 2        leukemia    Genetic Disease Niece          hemifacial microsomia    Genetic Disease Niece         hemifacial microsomia       Social History:  Unchanged from consult note    Current Medications:    Current Outpatient Medications:     empagliflozin (JARDIANCE) 10 MG Oral Tab, Take 1 tablet (10 mg total) by mouth daily., Disp: 90 tablet, Rfl: 0    atorvastatin 20 MG Oral Tab, Take 1 tablet (20 mg total) by mouth nightly., Disp: 90 tablet, Rfl: 0    metoprolol tartrate 25 MG Oral Tab, Take 1 tablet (25 mg total) by mouth 2x Daily(Beta Blocker)., Disp: 180 tablet, Rfl: 1    PEG 3350-KCl-Na Bicarb-NaCl (GAVILYTE-N WITH FLAVOR PACK) 420 g Oral Recon Soln, Split dose. Please follow [facility] bowel prep instructions., Disp: 1 each, Rfl: 0    metFORMIN HCl 500 MG Oral Tab, Take 1 tablet (500 mg total) by mouth 2 (two) times daily with meals., Disp: , Rfl:     cetirizine 10 MG Oral Tab, Take 1 tablet (10 mg total) by mouth as needed for Allergies., Disp: , Rfl:     metFORMIN  MG Oral Tablet 24 Hr, Take 1 tablet (750 mg total) by mouth 2 (two) times daily with meals. (Patient not taking: Reported on 11/4/2024), Disp: 180 tablet, Rfl: 0    Allergies:  Allergies   Allergen Reactions    Codeine HIVES and NAUSEA ONLY    Seasonal OTHER (SEE COMMENTS)     Runny nose, sneezing, itchy eyes        Review of Systems:  All other systems reviewed and negative x10 except as listed above    Vital Signs:  /82 (BP Location: Left arm, Patient Position: Sitting, Cuff Size: adult)   Pulse 75   Temp 97.8 °F (36.6 °C) (Oral)   Resp 16   Ht 1.422 m (4' 8\")   Wt 61.5 kg (135 lb 9.6 oz)   SpO2 100%   BMI 30.40 kg/m²     Physical Examination:  Performance Status:  General: Patient is alert and oriented x 3, not in acute distress.  HEENT: EOMs intact. MMM  Chest:  nonlabored breathing, CTA  Abdomen: Soft, non tender to palpitation without any rebound, no masses  Extremities: No edema, cyanosis, or bruising   Neurological: motor strength grossly intact, MA4E  Psych:  appropriate mood and affect        Laboratory assessed and reviewed:  Lab Results   Component Value Date    WBC 5.7 10/23/2024    RBC 5.16 10/23/2024    HGB 14.4 10/23/2024    HCT 43.7 10/23/2024    MCV 84.7 10/23/2024    MCH 27.9 10/23/2024    MCHC 33.0 10/23/2024    RDW 12.5 10/23/2024    .0 10/23/2024       Lab Results   Component Value Date     (H) 10/23/2024    BUN 13 10/23/2024    BUNCREA 16.0 10/23/2024    CREATSERUM 0.81 10/23/2024    ANIONGAP 7 10/23/2024    GFRNAA 104 06/17/2021    GFRAA 119 06/17/2021    CA 9.6 10/23/2024    OSMOCALC 294 10/23/2024    ALKPHO 148 (H) 10/23/2024    AST 29 10/23/2024    ALT 35 10/23/2024    BILT 0.7 10/23/2024    TP 7.7 10/23/2024    ALB 4.5 10/23/2024    GLOBULIN 3.2 10/23/2024     10/23/2024    K 4.1 10/23/2024     10/23/2024    CO2 26.0 10/23/2024     Imaging:  N/A    Impression and Plan:    1.) Hx of high risk stage II (pT4aN0) adenocarcinoma of the right colon   - Status post resection 6/5/2020.  MSI-H  - s/p adjuvant FOLFOX with Dr. Theodore; tolerability issues and hospitalization  - CEA suppressed  - due for surveillance CT scan now; orders placed. CLN due in 2/2025 with GI  - return in 6 mo    2.)chemo-induced peripheral neuropathy  --stable; grade 1, no longer using cymbalta     3.) Family hx of cancer  -- MMR loss  --Family history of GI malignancies. 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

## 2024-11-15 ENCOUNTER — OFFICE VISIT (OUTPATIENT)
Dept: OBGYN CLINIC | Facility: CLINIC | Age: 54
End: 2024-11-15
Payer: COMMERCIAL

## 2024-11-15 VITALS
HEIGHT: 58 IN | HEART RATE: 99 BPM | BODY MASS INDEX: 28.55 KG/M2 | SYSTOLIC BLOOD PRESSURE: 133 MMHG | DIASTOLIC BLOOD PRESSURE: 89 MMHG | WEIGHT: 136 LBS

## 2024-11-15 DIAGNOSIS — Z01.419 NORMAL GYNECOLOGIC EXAMINATION: Primary | ICD-10-CM

## 2024-11-15 PROCEDURE — 99386 PREV VISIT NEW AGE 40-64: CPT | Performed by: OBSTETRICS & GYNECOLOGY

## 2024-11-15 NOTE — PROGRESS NOTES
Andria Payton is a 54 year old female  No LMP recorded. Patient is postmenopausal.   Chief Complaint   Patient presents with    Annual   Menopause. Annual exam.     OBSTETRICS HISTORY:     OB History    Para Term  AB Living   3 2     1 2   SAB IAB Ectopic Multiple Live Births   1              # Outcome Date GA Lbr Terrell/2nd Weight Sex Type Anes PTL Lv   3 SAB            2 Para      NORMAL SPONT      1 Para      NORMAL SPONT          GYNE HISTORY:     Pap Result Notes: 10-20 years ago ?   Menarche: 10 (11/15/2024  2:48 PM)  Period Cycle (Days):  (11/15/2024  2:48 PM)  Use of Birth Control (if yes, specify type): Postmenopausal (11/15/2024  2:48 PM)  Pap Result Notes: 10-20 years ago ? (11/15/2024  2:48 PM)         No data to display                  MEDICAL HISTORY:     Past Medical History:    Colon cancer (HCC)    Diabetes (HCC)    High blood pressure       SURGICAL HISTORY:     Past Surgical History:   Procedure Laterality Date    Cholecystectomy      Colonoscopy N/A 2020    Procedure: COLONOSCOPY;  Surgeon: Shad Vital MD;  Location: [facility] ENDOSCOPY    Colonoscopy N/A 2021    Procedure: COLONOSCOPY;  Surgeon: Shad Vital MD;  Location: [facility] ENDOSCOPY       SOCIAL HISTORY:     Social History     Socioeconomic History    Marital status:    Tobacco Use    Smoking status: Never     Passive exposure: Never    Smokeless tobacco: Never   Vaping Use    Vaping status: Never Used   Substance and Sexual Activity    Alcohol use: Yes     Alcohol/week: 1.0 standard drink of alcohol     Types: 1 Cans of beer per week     Comment: occ    Drug use: Never    Sexual activity: Yes     Partners: Male   Social History Narrative    Andria is . She has 2 adult children. 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 She lives in [place], [place].     Social Drivers of Health      [facility], [facility]    Social Connections    Received  [facility], [facility]    Housing Stability        FAMILY HISTORY:     Family History   Problem Relation Age of Onset    Diabetes Father     Hypertension Mother     Thyroid Disorder Mother     Cancer Maternal Grandmother 87        stomach ca, non-smoker    Colon Cancer Maternal Aunt 72    Cancer Maternal Cousin Female          unknown primary (dtr of aunt w/colon ca)    Cancer Maternal Cousin Female 59        stomach ca (dtr of aunt w/colon ca)    Cancer Niece 2        leukemia    Genetic Disease Niece         hemifacial microsomia    Genetic Disease Niece         hemifacial microsomia       MEDICATIONS:       Current Outpatient Medications:     metFORMIN  MG Oral Tablet 24 Hr, Take 1 tablet (750 mg total) by mouth 2 (two) times daily with meals., Disp: 180 tablet, Rfl: 0    atorvastatin 20 MG Oral Tab, Take 1 tablet (20 mg total) by mouth nightly., Disp: 90 tablet, Rfl: 0    metoprolol tartrate 25 MG Oral Tab, Take 1 tablet (25 mg total) by mouth 2x Daily(Beta Blocker)., Disp: 180 tablet, Rfl: 1    PEG 3350-KCl-Na Bicarb-NaCl (GAVILYTE-N WITH FLAVOR PACK) 420 g Oral Recon Soln, Split dose. Please follow [facility] bowel prep instructions., Disp: 1 each, Rfl: 0    metFORMIN HCl 500 MG Oral Tab, Take 1 tablet (500 mg total) by mouth 2 (two) times daily with meals., Disp: , Rfl:     cetirizine 10 MG Oral Tab, Take 1 tablet (10 mg total) by mouth as needed for Allergies., Disp: , Rfl:     empagliflozin (JARDIANCE) 10 MG Oral Tab, Take 1 tablet (10 mg total) by mouth daily. (Patient not taking: Reported on 11/15/2024), Disp: 90 tablet, Rfl: 0    ALLERGIES:     Allergies[1]      REVIEW OF SYSTEMS:     Constitutional:    denies fever / chills  Eyes:     denies blurred or double vision  Cardiovascular:  denies chest pain or palpitations  Respiratory:    denies shortness of breath  Gastrointestinal:  denies severe abdominal pain, frequent diarrhea or constipation, nausea /  vomiting  Genitourinary:    denies dysuria, bothersome incontinence  Skin/Breast:   denies any breast pain, lumps, or discharge  Neurological:    denies frequent severe headaches  Psychiatric:   denies depression or anxiety, thoughts of harming self or others  Heme/Lymph:    denies easy bruising or bleeding      PHYSICAL EXAM:   Blood pressure 133/89, pulse 99, height 4' 10\" (1.473 m), weight 136 lb (61.7 kg).  Constitutional:  well developed, well nourished  Head/Face:  normocephalic  Neck/Thyroid: thyroid symmetric, no thyromegaly, no nodules, no adenopathy  Lymphatic: no abnormal supraclavicular or axillary adenopathy is noted  Respiratory:      chest wall symmetric and nontender on palpation, clear to asculation bilateral, no wheezing, rales, ronchi, and resonance normal upon percussion  Cardiovascular: chest normal in appearance, regular rate and rhythm, no murmurs, PMI palpated midclavicular line  Breast:   normal bilaterally without palpable masses, tenderness, asymmetry, nipple discharge, nipple retraction or skin changes bilaterally  Abdomen:   soft, nontender, nondistended, no masses  Skin/Hair:  no unusual rashes or bruises  Extremities:  no edema, no cyanosis, non tender bilaterally  Psychiatric:   oriented to time, place, person and situation. Appropriate mood and affect    Pelvic Exam:  External Genitalia:  normal appearance, hair distribution, and no lesions  Urethral Meatus:   normal in size, location, without lesions   Bladder:    no fullness, masses or tenderness  Vagina:    normal appearance without lesions, no abnormal discharge  Cervix:    No lesions, normal friability   Uterus:    normal in size, 8 wk sized, normal contour, position, mobility, without  motion tenderness  Adnexa:   normal without masses or tenderness  Perineum:   normal  Anus: no hemorroids         ASSESSMENT & PLAN:     Andria was seen today for annual.    Diagnoses and all orders for this visit:    Normal gynecologic  examination  -     ThinPrep Pap with HPV Reflex, Chlamydia/GC; Future  -     Chlamydia/Gc Amplification  Nutrition, weight screening and exercise were discussed with the patient.  Exercise should encompass approximately 150 minutes/week.  Self breast exam counseling was also given.  I advised the patient to avoid tobacco, drugs and alcohol.  Influenza vaccine was offered if seasonally appropriate.  HPV and STD prevention counseling was given.  Health maintenance checklist  was reviewed including Pap smear, cervical cultures, and mammogram screening if age-appropriate.  Appropriate follow-up scheduling was discussed with the patient.        Mammogram is ordered    FOLLOW-UP     Return in about 1 year (around 11/15/2025) for Annual exam.      Manuel Stewart MD  11/15/2024       [1]   Allergies  Allergen Reactions    Codeine HIVES and NAUSEA ONLY    Seasonal OTHER (SEE COMMENTS)     Runny nose, sneezing, itchy eyes

## 2024-11-19 LAB — HPV E6+E7 MRNA CVX QL NAA+PROBE: NEGATIVE

## 2024-11-27 ENCOUNTER — OFFICE VISIT (OUTPATIENT)
Dept: INTERNAL MEDICINE CLINIC | Facility: CLINIC | Age: 54
End: 2024-11-27

## 2024-11-27 VITALS
SYSTOLIC BLOOD PRESSURE: 132 MMHG | DIASTOLIC BLOOD PRESSURE: 79 MMHG | TEMPERATURE: 97 F | HEART RATE: 77 BPM | BODY MASS INDEX: 28.97 KG/M2 | HEIGHT: 58 IN | WEIGHT: 138 LBS | OXYGEN SATURATION: 97 %

## 2024-11-27 DIAGNOSIS — Z96.41 TYPE 2 DIABETES MELLITUS WITHOUT COMPLICATION, WITH LONG TERM CURRENT USE OF INSULIN PUMP (HCC): Primary | ICD-10-CM

## 2024-11-27 DIAGNOSIS — E11.9 TYPE 2 DIABETES MELLITUS WITHOUT COMPLICATION, WITH LONG TERM CURRENT USE OF INSULIN PUMP (HCC): Primary | ICD-10-CM

## 2024-11-27 PROCEDURE — 90677 PCV20 VACCINE IM: CPT | Performed by: INTERNAL MEDICINE

## 2024-11-27 PROCEDURE — 90471 IMMUNIZATION ADMIN: CPT | Performed by: INTERNAL MEDICINE

## 2024-11-27 PROCEDURE — 99214 OFFICE O/P EST MOD 30 MIN: CPT | Performed by: INTERNAL MEDICINE

## 2024-11-27 NOTE — PROGRESS NOTES
Subjective:     Patient ID: Andria Payton is a 54 year old female.    Diabetes        History/Other: Today for follow-up on her diabetes according to the patient she is more compliant with her diet now she is monitoring her blood sugar and her fasting sugar is around 140s she takes only metformin.  Insurance did not cover a Jardiance.  She has follow-up appointment with endocrinology.    She got her flu and COVID-vaccine.  She schedule appointment with GI for colonoscopy  She was also seen by oncology    Review of Systems   Constitutional: Negative.    HENT: Negative.     Eyes: Negative.    Respiratory: Negative.     Cardiovascular: Negative.    Gastrointestinal: Negative.    Endocrine: Negative.    Genitourinary: Negative.    Musculoskeletal: Negative.    Skin: Negative.    Neurological: Negative.    Hematological: Negative.    Psychiatric/Behavioral: Negative.       Current Outpatient Medications   Medication Sig Dispense Refill    metFORMIN  MG Oral Tablet 24 Hr Take 1 tablet (750 mg total) by mouth 2 (two) times daily with meals. 180 tablet 0    atorvastatin 20 MG Oral Tab Take 1 tablet (20 mg total) by mouth nightly. 90 tablet 0    metoprolol tartrate 25 MG Oral Tab Take 1 tablet (25 mg total) by mouth 2x Daily(Beta Blocker). 180 tablet 1    cetirizine 10 MG Oral Tab Take 1 tablet (10 mg total) by mouth as needed for Allergies.      empagliflozin (JARDIANCE) 10 MG Oral Tab Take 1 tablet (10 mg total) by mouth daily. (Patient not taking: Reported on 11/15/2024) 90 tablet 0    PEG 3350-KCl-Na Bicarb-NaCl (GAVILYTE-N WITH FLAVOR PACK) 420 g Oral Recon Soln Split dose. Please follow [facility] bowel prep instructions. 1 each 0    metFORMIN HCl 500 MG Oral Tab Take 1 tablet (500 mg total) by mouth 2 (two) times daily with meals. (Patient not taking: Reported on 11/27/2024)       Allergies:Allergies[1]    Past Medical History:    Colon cancer (HCC)    Diabetes (HCC)    High blood pressure      Past Surgical  History:   Procedure Laterality Date    Cholecystectomy      Colonoscopy N/A 06/01/2020    Procedure: COLONOSCOPY;  Surgeon: Shad Vital MD;  Location: [facility] ENDOSCOPY    Colonoscopy N/A 11/08/2021    Procedure: COLONOSCOPY;  Surgeon: Shad Vital MD;  Location: [facility] ENDOSCOPY      Family History   Problem Relation Age of Onset    Diabetes Father     Hypertension Mother     Thyroid Disorder Mother     Cancer Maternal Grandmother 87        stomach ca, non-smoker    Colon Cancer Maternal Aunt 72    Cancer Maternal Cousin Female          unknown primary (dtr of aunt w/colon ca)    Cancer Maternal Cousin Female 59        stomach ca (dtr of aunt w/colon ca)    Cancer Niece 2        leukemia    Genetic Disease Niece         hemifacial microsomia    Genetic Disease Niece         hemifacial microsomia      Social History:   Social History     Socioeconomic History    Marital status:    Tobacco Use    Smoking status: Never     Passive exposure: Never    Smokeless tobacco: Never   Vaping Use    Vaping status: Never Used   Substance and Sexual Activity    Alcohol use: Yes     Alcohol/week: 1.0 standard drink of alcohol     Types: 1 Cans of beer per week     Comment: occ    Drug use: Never    Sexual activity: Yes     Partners: Male   Social History Narrative    Andria is . She has 2 adult children. 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 She lives in [place], [place].     Social Drivers of Health     Food Insecurity: No Food Insecurity (11/27/2024)    NCSS - Food Insecurity     Worried About Running Out of Food in the Last Year: No     Ran Out of Food in the Last Year: No   Transportation Needs: No Transportation Needs (11/27/2024)    NCSS - Transportation     Lack of Transportation: No    [facility], [facility]    Social Connections   Housing Stability: Not At Risk (11/27/2024)    NCSS - Housing/Utilities     Has Housing: Yes     Worried About Losing  Housing: No     Unable to Get Utilities: No        Objective:   Physical Exam  Vitals and nursing note reviewed.   Constitutional:       Appearance: Normal appearance.   HENT:      Head: Normocephalic and atraumatic.   Cardiovascular:      Rate and Rhythm: Normal rate and regular rhythm.      Pulses: Normal pulses.      Heart sounds: Normal heart sounds.   Abdominal:      General: Bowel sounds are normal.      Palpations: Abdomen is soft.   Musculoskeletal:         General: Normal range of motion.      Cervical back: Normal range of motion and neck supple.   Neurological:      General: No focal deficit present.      Mental Status: She is alert.         Assessment & Plan:   1. Type 2 diabetes mellitus without complication, with long term current use of insulin pump (HCC)    Discussed about diet continue with metformin for now follow-up with endocrinology I also gave her referral for ophthalmology      2 Prevnar 20    No orders of the defined types were placed in this encounter.      Meds This Visit:  Requested Prescriptions      No prescriptions requested or ordered in this encounter       Imaging & Referrals:  OPHTHALMOLOGY - INTERNAL            [1]   Allergies  Allergen Reactions    Codeine HIVES and NAUSEA ONLY    Seasonal OTHER (SEE COMMENTS)     Runny nose, sneezing, itchy eyes

## 2024-12-10 ENCOUNTER — HOSPITAL ENCOUNTER (OUTPATIENT)
Dept: MAMMOGRAPHY | Facility: HOSPITAL | Age: 54
Discharge: HOME | End: 2024-12-10
Attending: INTERNAL MEDICINE
Payer: COMMERCIAL

## 2024-12-10 DIAGNOSIS — Z12.31 ENCOUNTER FOR SCREENING MAMMOGRAM FOR MALIGNANT NEOPLASM OF BREAST: ICD-10-CM

## 2024-12-10 PROCEDURE — 77063 BREAST TOMOSYNTHESIS BI: CPT | Performed by: INTERNAL MEDICINE

## 2024-12-10 PROCEDURE — 77067 SCR MAMMO BI INCL CAD: CPT | Performed by: INTERNAL MEDICINE

## 2025-01-02 ENCOUNTER — HOSPITAL ENCOUNTER (OUTPATIENT)
Dept: MAMMOGRAPHY | Facility: HOSPITAL | Age: 55
Discharge: HOME | End: 2025-01-02
Attending: INTERNAL MEDICINE
Payer: COMMERCIAL

## 2025-01-02 ENCOUNTER — HOSPITAL ENCOUNTER (OUTPATIENT)
Dept: ULTRASOUND IMAGING | Facility: HOSPITAL | Age: 55
Discharge: HOME | End: 2025-01-02
Attending: INTERNAL MEDICINE
Payer: COMMERCIAL

## 2025-01-02 DIAGNOSIS — R92.8 ABNORMAL MAMMOGRAM: ICD-10-CM

## 2025-01-02 PROCEDURE — 76642 ULTRASOUND BREAST LIMITED: CPT | Performed by: INTERNAL MEDICINE

## 2025-01-02 PROCEDURE — 77061 BREAST TOMOSYNTHESIS UNI: CPT | Performed by: INTERNAL MEDICINE

## 2025-01-02 PROCEDURE — 77065 DX MAMMO INCL CAD UNI: CPT | Performed by: INTERNAL MEDICINE

## 2025-01-14 ENCOUNTER — TELEPHONE (OUTPATIENT)
Age: 55
End: 2025-01-14

## 2025-01-14 NOTE — TELEPHONE ENCOUNTER
Called and unable to reach the patient. Left a VM requesting a call back regarding her CT scan. Provided the office phone number.

## 2025-01-27 ENCOUNTER — OFFICE VISIT (OUTPATIENT)
Dept: ENDOCRINOLOGY CLINIC | Facility: CLINIC | Age: 55
End: 2025-01-27

## 2025-01-27 VITALS
DIASTOLIC BLOOD PRESSURE: 80 MMHG | HEART RATE: 86 BPM | SYSTOLIC BLOOD PRESSURE: 127 MMHG | HEIGHT: 58 IN | WEIGHT: 137 LBS | BODY MASS INDEX: 28.76 KG/M2

## 2025-01-27 DIAGNOSIS — E11.59 HYPERTENSION ASSOCIATED WITH TYPE 2 DIABETES MELLITUS (HCC): ICD-10-CM

## 2025-01-27 DIAGNOSIS — E11.9 TYPE 2 DIABETES MELLITUS WITHOUT COMPLICATION, WITHOUT LONG-TERM CURRENT USE OF INSULIN (HCC): ICD-10-CM

## 2025-01-27 DIAGNOSIS — T45.1X5A CHEMOTHERAPY-INDUCED NEUROPATHY (HCC): ICD-10-CM

## 2025-01-27 DIAGNOSIS — I15.2 HYPERTENSION ASSOCIATED WITH TYPE 2 DIABETES MELLITUS (HCC): ICD-10-CM

## 2025-01-27 DIAGNOSIS — E78.2 MIXED HYPERLIPIDEMIA: ICD-10-CM

## 2025-01-27 DIAGNOSIS — G62.0 CHEMOTHERAPY-INDUCED NEUROPATHY (HCC): ICD-10-CM

## 2025-01-27 DIAGNOSIS — E11.65 UNCONTROLLED TYPE 2 DIABETES MELLITUS WITH HYPERGLYCEMIA (HCC): Primary | ICD-10-CM

## 2025-01-27 DIAGNOSIS — R73.09 HIGH GLUCOSE LEVEL: ICD-10-CM

## 2025-01-27 DIAGNOSIS — C18.2 MALIGNANT NEOPLASM OF ASCENDING COLON (HCC): ICD-10-CM

## 2025-01-27 LAB
CARTRIDGE LOT#: ABNORMAL NUMERIC
GLUCOSE BLOOD: 321
HEMOGLOBIN A1C: 11.2 % (ref 4.3–5.6)
TEST STRIP LOT #: NORMAL NUMERIC

## 2025-01-27 RX ORDER — BLOOD SUGAR DIAGNOSTIC
1 STRIP MISCELLANEOUS DAILY
Qty: 100 EACH | Refills: 2 | Status: SHIPPED | OUTPATIENT
Start: 2025-01-27 | End: 2025-04-27

## 2025-01-27 RX ORDER — GLIPIZIDE 5 MG/1
5 TABLET ORAL
Qty: 180 TABLET | Refills: 0 | Status: SHIPPED | OUTPATIENT
Start: 2025-01-27

## 2025-01-27 RX ORDER — INSULIN GLARGINE 100 [IU]/ML
20 INJECTION, SOLUTION SUBCUTANEOUS NIGHTLY
Qty: 18 ML | Refills: 0 | Status: SHIPPED | OUTPATIENT
Start: 2025-01-27 | End: 2025-04-27

## 2025-01-27 RX ORDER — TIRZEPATIDE 7.5 MG/.5ML
7.5 INJECTION, SOLUTION SUBCUTANEOUS
Qty: 6 ML | Refills: 0 | Status: SHIPPED | OUTPATIENT
Start: 2025-03-24

## 2025-01-27 RX ORDER — METFORMIN HYDROCHLORIDE 500 MG/1
1000 TABLET, EXTENDED RELEASE ORAL 2 TIMES DAILY WITH MEALS
Qty: 360 TABLET | Refills: 1 | Status: SHIPPED | OUTPATIENT
Start: 2025-01-27 | End: 2025-07-26

## 2025-01-27 RX ORDER — TIRZEPATIDE 5 MG/.5ML
5 INJECTION, SOLUTION SUBCUTANEOUS
Qty: 2 ML | Refills: 0 | Status: SHIPPED | OUTPATIENT
Start: 2025-02-24

## 2025-01-27 RX ORDER — BLOOD-GLUCOSE,RECEIVER,CONT
1 EACH MISCELLANEOUS ONCE
Qty: 1 EACH | Refills: 0 | Status: SHIPPED | OUTPATIENT
Start: 2025-01-27 | End: 2025-01-27

## 2025-01-27 RX ORDER — TIRZEPATIDE 2.5 MG/.5ML
2.5 INJECTION, SOLUTION SUBCUTANEOUS
Qty: 2 ML | Refills: 0 | Status: SHIPPED | OUTPATIENT
Start: 2025-01-27

## 2025-01-27 RX ORDER — HYDROCHLOROTHIAZIDE 12.5 MG/1
1 CAPSULE ORAL
Qty: 6 EACH | Refills: 0 | Status: SHIPPED | OUTPATIENT
Start: 2025-01-27

## 2025-01-27 NOTE — PROGRESS NOTES
New Patient Evaluation - History and Physical    CONSULT - Reason for Visit:  uncontrolled DM  Requesting Physician:   ..MD Rafiq CUEVAS Arlinda, MD  [address] [place] [id number]   CHIEF COMPLAINT:    Chief Complaint   Patient presents with    Diabetes     Consult          HISTORY OF PRESENT ILLNESS:   Andria Payton is a 54 year old female who presents with uncontrolled DM  She has hx of colon cancer in 2020  s/p surgery and chemo  , no radiation . Cancer free now   2025 b A1c: 11.2    DM HISTORY  Diagnosed:        CURRENT DIABETIC MEDICATIONS INCLUDE:   mg bid     HISTORY OF DIABETES COMPLICATIONS: :  History of Retinopathy: 2025   History of Neuropathy:  no  History of Nephropathy: no    ASSOCIATED COMPLICATIONS:   HTN:  BP Meds: metoprolol tartrate Tabs - 25 MG     Hyperlipidemia: Cholesterol Meds: atorvastatin Tabs - 20 MG     CAD/ASCVD/PAD/CVA:  no    HOME GLUCOSE READINGS:      fasting      Diet   Meals 2  Snacks fruits   Drinks water, soda regular   EXERCISE:  no    Lab Results   Component Value Date    A1C 11.2 (A) 2025    A1C 12.3 (H) 10/23/2024    A1C 7.6 (H) 2020        DM quality measures:  A1C/Blood pressure: as reported above   Last dilated eye exam: No data recorded   Exam shows retinopathy? No data recorded  Last diabetic foot exam: Last Foot Exam: 10/23/24    Dentist : recommend every 6m  Nephropathy screening:   ace /arb rx:     LIPID screening: Cholesterol Meds: atorvastatin Tabs - 20 MG     Cholesterol: 225, done on 10/23/2024.  HDL Cholesterol: 55, done on 10/23/2024.  TriGlycerides 172, done on 10/23/2024.  LDL Cholesterol: 139, done on 10/23/2024.     Micro Albumen/Creatinine:    Lab Results   Component Value Date    MICROALBCREA 21.3 10/23/2024         ASSESSMENT AND PLAN:  Andria Payton is a 54 year old female who presents with uncontrolled DM , HTN, DLP,  and Colon cancer 2020  s/p surgery and chemo  , no radiation .  Cancer free now   Has neuropathy from chemo     Plan  3 mo rtc   Acei next visit  Metformin start with 500 mg daily for a week, increase to 500 mg twice a day for a week, then 1000 mg at night and 500 mg in the morning for a week, then increase to 1000 mg twice day. If getting diarrhea, wait and do not increase the dose till the diarrhea resolves.   Glipizide 5 mg twice a day with meals - skip the dose if not eating. Take 10 mg for high carb meals  Will start new meds if covered -  mounjaro and farxiga    Check with your insurance which med is covered (  Mounjaro vs Ozempic vs Trulicity. If mounjaro is not covered, then will use ozempic. If ozempic is not covered then will use trulicity  ) ( Jardiance vs Farxiga vs Invokana)   Will start long acting insulin once a day lantus 20 units - will stop it later when we do not need it later     Will give CGM/Continuous Glucose Monitors samples and send Rx. Might not be covered by your insurance.   follow up with diabetes educator in 2 weeks for CGM download  Will refer to diabetes educator, podiatry, ophthalmology    Check blood sugar fasting, before meals and before bedtime.   If you have low blood sugar <70, take 15 grams of carb (8 oz juice or regular soda) and recheck in 15 minutes.    Follow up with podiatry and eye doctor annually.   Patients need to wear covered shoes all the time and check feet daily.   Bring your meter/BG log to the next visit.      Target A1c 6.5%  Target BG   BEFORE MEAL 100-130mg/dl  2hrs AFTER MEAL less than 180mg/dl    GLP-1 agonists:  No personal or family history of MEN syndrome   No personal history pancreatitis   Patient counselled regarding side effects including injection site reactions, nausea, vomiting, diarrhea, pancreatitis, gastroparesis and rare side effect felipe Sebastian syndrome.    SGLT2 inhibitors  No UTI or yeast infection   Discussed side effects including UTI and fungal infections.   Discussed the importance of  hydration.    HYPERTENSION  https://www.acpjournals.org/pb-assets/pdf/patient-info/[email or web address].pdf    If blood pressure is high, monitor blood pressure at home and follow up with primary care.   Some people's blood pressure readings differ between the doctor's office and at home.     Am I at Risk?  There is no single identifiable cause of hypertension. Many factors can contribute, including:   Being overweight or obese   Eating a diet high in sodium (salt)   Not getting enough physical activity   Being older or    Smoking   Drinking too much alcohol   Having a personal or family history of hypertension   Having other chronic diseases, especially diabetes or kidney disease      We discussed these in detail with the patient and negotiated which of numerous possible changes in the in the treatment plan that would be acceptable to them. The patient remains at ongoing is at high risk for complications related to uncontrolled diabetes and treatment.  The patient requires a great deal of self-management and support. We expect the patient's risk to be reduced with the changes to the treatment plan that we recommended today.   We reviewed a very large amount of complicated data including BG target range, basal insulin doses, meal and correction related insulin boluses, time of meal, size of meal, time of BG testing and insulin administration in relations to meals. We also reviewed self-testing BG results if available.         PAST MEDICAL HISTORY:   Past Medical History:    Colon cancer (HCC)    Diabetes (HCC)    High blood pressure     Colon cancer June 2020  s/p surgery and chemo  , no radiation . Cancer free now     PAST SURGICAL HISTORY:   Past Surgical History:   Procedure Laterality Date    Cholecystectomy      Colonoscopy N/A 06/01/2020    Procedure: COLONOSCOPY;  Surgeon: Shad Vital MD;  Location: [facility] ENDOSCOPY    Colonoscopy N/A 11/08/2021    Procedure:  COLONOSCOPY;  Surgeon: Shad Vital MD;  Location: [facility] ENDOSCOPY       CURRENT MEDICATIONS:     Continuous Glucose  (FREESTYLE PANCHITO 3 READER) Does not apply Misc 1 each once for 1 dose. 1 each 0    Continuous Glucose Sensor (FREESTYLE PANCHITO 3 PLUS SENSOR) Does not apply Misc 1 each every 14 (fourteen) days. To check glucose 6 each 0    metFORMIN  MG Oral Tablet 24 Hr Take 2 tablets (1,000 mg total) by mouth 2 (two) times daily with meals. 360 tablet 1    empagliflozin (JARDIANCE) 25 MG Oral Tab Take 1 tablet (25 mg total) by mouth daily. 90 tablet 0    [START ON 3/24/2025] Tirzepatide (MOUNJARO) 7.5 MG/0.5ML Subcutaneous Solution Auto-injector Inject 7.5 mg into the skin every 7 days. 6 mL 0    [START ON 2/24/2025] Tirzepatide (MOUNJARO) 5 MG/0.5ML Subcutaneous Solution Auto-injector Inject 5 mg into the skin every 7 days. 2 mL 0    Tirzepatide (MOUNJARO) 2.5 MG/0.5ML Subcutaneous Solution Auto-injector Inject 2.5 mg into the skin every 7 days. 2 mL 0    insulin glargine (LANTUS SOLOSTAR) 100 UNIT/ML Subcutaneous Solution Pen-injector Inject 20 Units into the skin nightly. 18 mL 0    Insulin Pen Needle (PEN NEEDLES) 32G X 6 MM Does not apply Misc 1 each daily. 100 each 2    glipiZIDE 5 MG Oral Tab Take 1 tablet (5 mg total) by mouth 2 (two) times daily before meals. 180 tablet 0    atorvastatin 20 MG Oral Tab Take 1 tablet (20 mg total) by mouth nightly. 90 tablet 0    metoprolol tartrate 25 MG Oral Tab Take 1 tablet (25 mg total) by mouth 2x Daily(Beta Blocker). 180 tablet 1       ALLERGIES:  Allergies[1]    SOCIAL HISTORY:    Social History     Socioeconomic History    Marital status:    Tobacco Use    Smoking status: Never     Passive exposure: Never    Smokeless tobacco: Never   Vaping Use    Vaping status: Never Used   Substance and Sexual Activity    Alcohol use: Yes     Alcohol/week: 1.0 standard drink of alcohol     Types: 1 Cans of beer per week     Comment: occ    Drug use:  Never    Sexual activity: Yes     Partners: Male     Not working   No smoking, marijuana  Etoh  or Drugs     FAMILY HISTORY:   Family History   Problem Relation Age of Onset    Colon Cancer Self 51    Hypertension Mother     Thyroid Disorder Mother     Diabetes Father     Cancer Maternal Grandmother 87        stomach ca, non-smoker    Colon Cancer Maternal Aunt 72    Cancer Niece 2        leukemia    Genetic Disease Niece         hemifacial microsomia    Genetic Disease Niece         hemifacial microsomia    Cancer Maternal Cousin Female          unknown primary (dtr of aunt w/colon ca)    Cancer Maternal Cousin Female 59        stomach ca (dtr of aunt w/colon ca)        REVIEW OF SYSTEMS:  All negative other than HPI    PHYSICAL EXAM:   Height: 4' 10\" (147.3 cm) (01/27 1103)  Weight: 137 lb (62.1 kg) (01/27 1103)  BSA (Calculated - sq m): 1.55 sq meters (01/27 1103)  Pulse: 86 (01/27 1103)  BP: 127/80 (01/27 1103)  Temp: --  Do Not Use - Resp Rate: --  SpO2: --    Body mass index is 28.63 kg/m².  Wt Readings from Last 6 Encounters:   01/27/25 137 lb (62.1 kg)   11/27/24 138 lb (62.6 kg)   11/15/24 136 lb (61.7 kg)   11/04/24 135 lb 9.6 oz (61.5 kg)   10/23/24 135 lb (61.2 kg)   11/08/21 145 lb (65.8 kg)       CONSTITUTIONAL:  Awake and alert. Age appropriate, good hygiene not in acute distress. Well-nourished and well developed. no acute distress   PSYCH:    Normal mood and affect,   cooperative  Neuro: speech is clear. Awake, alert, no aphasia, no facial asymmetry,    EYES:  No proptosis, no ptosis, conjunctiva normal  ENT:  Normocephalic, atraumatic  Eye: EOMI, normal lids, no discharge,    No rhinorrhea, moist oral mucosa  Neck: full range of motion  Neck/Thyroid: neck inspection: normal, No scar, No goiter   LUNGS:  No acute respiratory distress, non-labored respiration. Speaking full sentences  CARDIOVASCULAR:  regular rate   ABDOMEN:  No abdominal pain.   SKIN:  Skin is dry, no obvious rashes or  lesions  EXTREMITIES: no gross abnormality   MSK: Moves extremities spontaneously. full range of motion in all major joints      DATA:     Pertinent data reviewed   CT ABD 2021 ADRENALS:   No defined mass or abnormal enlargement.     Lab Results   Component Value Date    A1C 11.2 (A) 01/27/2025    A1C 12.3 (H) 10/23/2024    A1C 7.6 (H) 05/31/2020       Latest Reference Range & Units 10/23/24 11:19   ALKALINE PHOSPHATASE 41 - 108 U/L 148 (H)   AST (SGOT) <34 U/L 29   ALT (SGPT) 10 - 49 U/L 35   Total Bilirubin 0.3 - 1.2 mg/dL 0.7   Globulin 2.0 - 3.5 g/dL 3.2   Cholesterol, Total <200 mg/dL 225 (H)   Triglycerides 30 - 149 mg/dL 172 (H)   HDL Cholesterol 40 - 59 mg/dL 55   VLDL 0 - 30 mg/dL 32 (H)   NON-HDL CHOLESTEROL <130 mg/dL 170 (H)   LDL Cholesterol Calc <100 mg/dL 139 (H)   (H): Data is abnormally high     10/23/24 11:19   TSH  2.182      Latest Reference Range & Units 10/23/24 11:19   CREATININE UR RANDOM mg/dL 61.10   MALB URINE mg/dL 1.30   MALB/CRE CALC <=30.0 ug/mg 21.3     Cholesterol: 225, done on 10/23/2024.  HDL Cholesterol: 55, done on 10/23/2024.  LDL Cholesterol: 139, done on 10/23/2024.  TriGlycerides 172, done on 10/23/2024.    US BREAST LEFT LIMITED (CPT=76642)    Result Date: 1/2/2025  PROCEDURE: US BREAST LEFT LIMITED  (CPT=76642)  COMPARISON: None.  INDICATIONS: LT breast abnormal mammogram  TECHNIQUE:  Breast ultrasound was performed with evaluation only on specific areas of concern.   FINDINGS: See same day mammography report for findings and recommendations regarding breast ultrasound.       Dictated by (CST): Tavia Zheng MD on 1/02/2025 at 10:47 AM     Finalized by (CST): Tavia Zheng MD on 1/02/2025 at 10:48 AM          [facility] CHERYL 2D+3D DIAGNOSTIC ADDL VWS LEFT (CPT=77065/98098)    Result Date: 1/2/2025  CONCLUSION:     Probably benign focal asymmetry in the central posterior left breast on baseline mammogram, for which six-month follow-up left mammogram is recommended.    BI-RADS  CATEGORY:   DIAGNOSTIC CATEGORY 3 - PROBABLY BENIGN FINDING.   RECOMMENDATIONS:  SHORT TERM FOLLOW-UP DIAGNOSTIC MAMMOGRAM LEFT BREAST IN 6 MONTHS.       PLEASE NOTE: NORMAL MAMMOGRAM DOES NOT EXCLUDE THE POSSIBILITY OF BREAST CANCER.  A CLINICALLY SUSPICIOUS PALPABLE LUMP SHOULD BE BIOPSIED.   For patients over the age of 40, the target due date for the patient's next mammogram has been entered into a reminder system.   Patient received a discharge summary from the technologist after completion of exam.  Breast marker legend used on images  Triangle = Palpable lump [place] = Skin tag or mole BB = Nipple Linear nikolai = Scar Square = Pain    Dictated by (CST): Tavia Zheng MD on 1/02/2025 at 9:44 AM     Finalized by (CST): Tavia Zheng MD on 1/02/2025 at 9:52 AM          [facility] CHERYL 2D+3D SCREENING BILAT (CPT=77067/77285)    Result Date: 12/11/2024  CONCLUSION:   Left breast focal asymmetry.  Additional left diagnostic mammogram and possible ultrasound recommended.  BI-RADS CATEGORY:   DIAGNOSTIC CATEGORY 0 - INCOMPLETE ASSESSMENT: NEED ADDITIONAL IMAGING EVALUATION.   RECOMMENDATIONS:  ADDITIONAL MAMMOGRAPHIC VIEWS REQUIRED: LEFT BREAST  --We will call the patient back for additional views and issue an addendum report.       PLEASE NOTE: NORMAL MAMMOGRAM DOES NOT EXCLUDE THE POSSIBILITY OF BREAST CANCER.  A CLINICALLY SUSPICIOUS PALPABLE LUMP SHOULD BE BIOPSIED.   For patients over the age of 40, the target due date for the patient's next mammogram has been entered into a reminder system.   Patient received a discharge summary from the technologist after completion of exam.  Breast marker legend used on images  Triangle = Palpable lump [place] = Skin tag or mole BB = Nipple Linear nikolai = Scar Square = Pain    Dictated by (CST): Levi Balderas MD on 12/11/2024 at 2:59 PM     Finalized by (CST): Levi Balderas MD on 12/11/2024 at 3:01 PM      [address] [place]  [address] [phone]   Micro Albumen/Creatinine:    Lab Results   Component Value Date    MICROALBCREA 21.3 10/23/2024      Diabetes:    Lab Results   Component Value Date    A1C 11.2 (A) 2025    A1C 12.3 (H) 10/23/2024    A1C 7.6 (H) 2020     (H) 10/23/2024     (H) 2020     Lab Results   Component Value Date    CHOLEST 225 (H) 10/23/2024     Lab Results   Component Value Date    HDL 55 10/23/2024     Lab Results   Component Value Date     (H) 10/23/2024     Lab Results   Component Value Date    TRIG 172 (H) 10/23/2024     Lab Results   Component Value Date    AST 29 10/23/2024    AST 57 (H) 2021    AST 34 2021     Lab Results   Component Value Date    ALT 35 10/23/2024    ALT 72 (H) 2021    ALT 52 2021         No results for input(s): \"TSH\", \"T4F\", \"T3F\", \"THYP\" in the last 72 hours.     POC HemoCue Glucose 201 (Finger stick glucose)        Component Value Flag Ref Range Units Status    GLUCOSE BLOOD 321        Final    Test Strip Lot # [id number]       Numeric Final    Test Strip Expiration Date 25       Date Final                  POC Glycohemoglobin [10927]        Component Value Flag Ref Range Units Status    HEMOGLOBIN A1C 11.2      4.3 - 5.6 % Final    Cartridge Lot# [id number]       Numeric Final    Cartridge Expiration Date 25       Date Final                  Orders Placed This Encounter   Procedures    POC HemoCue Glucose 201 (Finger stick glucose)    POC Glycohemoglobin [07084]     Orders Placed This Encounter    POC HemoCue Glucose 201 (Finger stick glucose)     Order Specific Question:   Release to patient     Answer:   Immediate    POC Glycohemoglobin [85716]     Order Specific Question:   Release to patient     Answer:   Immediate    Continuous Glucose  (FREESTYLE PANCHITO 3 READER) Does not apply Misc     Si each once for 1 dose.     Dispense:  1 each     Refill:  0    Continuous Glucose Sensor (FREESTYLE PANCHITO 3 PLUS  SENSOR) Does not apply Misc     Si each every 14 (fourteen) days. To check glucose     Dispense:  6 each     Refill:  0    metFORMIN  MG Oral Tablet 24 Hr     Sig: Take 2 tablets (1,000 mg total) by mouth 2 (two) times daily with meals.     Dispense:  360 tablet     Refill:  1    empagliflozin (JARDIANCE) 25 MG Oral Tab     Sig: Take 1 tablet (25 mg total) by mouth daily.     Dispense:  90 tablet     Refill:  0    Tirzepatide (MOUNJARO) 7.5 MG/0.5ML Subcutaneous Solution Auto-injector     Sig: Inject 7.5 mg into the skin every 7 days.     Dispense:  6 mL     Refill:  0    Tirzepatide (MOUNJARO) 5 MG/0.5ML Subcutaneous Solution Auto-injector     Sig: Inject 5 mg into the skin every 7 days.     Dispense:  2 mL     Refill:  0    Tirzepatide (MOUNJARO) 2.5 MG/0.5ML Subcutaneous Solution Auto-injector     Sig: Inject 2.5 mg into the skin every 7 days.     Dispense:  2 mL     Refill:  0    insulin glargine (LANTUS SOLOSTAR) 100 UNIT/ML Subcutaneous Solution Pen-injector     Sig: Inject 20 Units into the skin nightly.     Dispense:  18 mL     Refill:  0    Insulin Pen Needle (PEN NEEDLES) 32G X 6 MM Does not apply Misc     Si each daily.     Dispense:  100 each     Refill:  2    glipiZIDE 5 MG Oral Tab     Sig: Take 1 tablet (5 mg total) by mouth 2 (two) times daily before meals.     Dispense:  180 tablet     Refill:  0      TSH   Date Value Ref Range Status   10/23/2024 2.182 0.550 - 4.780 mIU/mL Final     @LASTLAB     Orders Placed This Encounter   Procedures    PODIATRY - INTERNAL     Referral Priority:   Routine     Referral Type:   OFFICE VISIT     Referral Location:   [facility]     Requested Specialty:   PODIATRIST     Number of Visits Requested:   1    DIABETIC EDUCATION - INTERNAL     Diabetic Education - [facility]     Referral Priority:   Routine     Referral Type:   Education     Requested Specialty:   ENDOCRINOLOGY     Number of Visits Requested:   1        This  is a specialized patient consultation in endocrinology and required comprehensive review of prior records, as well as current evaluation, with time required for consideration of complex endocrine issues and consultation. For this visit, I personally interviewed the patient, and family member if accompanied, performed the pertinent parts of the history and physical examination. ROS included screening for appropriate endocrine conditions.   Today's diagnosis and plan were reviewed in detail with the patient who states understanding and agrees with plan. I discussed with the patient possible diagnosis, differential diagnosis, need for work up, treatment options, alternatives and side effects.     Please see note for details about time spent which includes:   · pre-visit preparation  · reviewing records  · face to face time with the patient   · timely documentation of the encounter  · ordering medications/tests  · communication with care team  · care coordination    I appreciate the opportunity to be part of your patient's medical care and will keep you, as the referring and primary physicians, informed about the care of your patient. Please feel free to contact me should you have any questions.      The 21st Century Cures Act makes medical notes like these available to patients in the interest of transparency. Please be advised this is a medical document. Medical documents are intended to carry relevant information, facts as evident, and the clinical opinion of the practitioner. The medical note is intended as peer to peer communication and may appear blunt or direct. It is written in medical language and may contain abbreviations or verbiage that are unfamiliar.     Lise Thornton MD         [1]   Allergies  Allergen Reactions    Codeine HIVES and NAUSEA ONLY    Seasonal OTHER (SEE COMMENTS)     Runny nose, sneezing, itchy eyes

## 2025-01-27 NOTE — PATIENT INSTRUCTIONS
Metformin start with 500 mg daily for a week, increase to 500 mg twice a day for a week, then 1000 mg at night and 500 mg in the morning for a week, then increase to 1000 mg twice day. If getting diarrhea, wait and do not increase the dose till the diarrhea resolves.   Glipizide 5 mg twice a day with meals - skip the dose if not eating. Take 10 mg for high carb meals  Will start new meds if covered -  mounjaro and cali    Check with your insurance which med is covered (  Mounjaro vs Ozempic vs Trulicity. If mounjaro is not covered, then will use ozempic. If ozempic is not covered then will use trulicity  ) ( Jardiance vs Farxiga vs Invokana)   Will start long acting insulin once a day lantus 20 units - will stop it later when we do not need it later     Will give CGM/Continuous Glucose Monitors samples and send Rx. Might not be covered by your insurance.   follow up with diabetes educator in 2 weeks for CGM download  Will refer to diabetes educator, podiatry, ophthalmology    Check blood sugar fasting, before meals and before bedtime.   If you have low blood sugar <70, take 15 grams of carb (8 oz juice or regular soda) and recheck in 15 minutes.    Follow up with podiatry and eye doctor annually.   Patients need to wear covered shoes all the time and check feet daily.   Bring your meter/BG log to the next visit.      Target A1c 6.5%  Target BG   BEFORE MEAL 100-130mg/dl  2hrs AFTER MEAL less than 180mg/dl    GLP-1 agonists:  No personal or family history of MEN syndrome   No personal history pancreatitis   Patient counselled regarding side effects including injection site reactions, nausea, vomiting, diarrhea, pancreatitis, gastroparesis and rare side effect felipe Sebastian syndrome.    SGLT2 inhibitors  No UTI or yeast infection   Discussed side effects including UTI and fungal infections.   Discussed the importance of hydration.

## 2025-01-28 ENCOUNTER — TELEPHONE (OUTPATIENT)
Dept: ENDOCRINOLOGY CLINIC | Facility: CLINIC | Age: 55
End: 2025-01-28

## 2025-01-28 ENCOUNTER — TELEPHONE (OUTPATIENT)
Dept: ENDOCRINOLOGY | Facility: HOSPITAL | Age: 55
End: 2025-01-28

## 2025-01-28 NOTE — TELEPHONE ENCOUNTER
Patient called and LVM at 9:53am to schedule. Called patient back at 10:10am and LVM with office phone number.

## 2025-01-28 NOTE — TELEPHONE ENCOUNTER
Rcvd order from Dr. Thornton for DSMT. Called patient and LVM to schedule. Provided office phone number.

## 2025-01-29 ENCOUNTER — HOSPITAL ENCOUNTER (OUTPATIENT)
Dept: CT IMAGING | Facility: HOSPITAL | Age: 55
Discharge: HOME | End: 2025-01-29
Attending: INTERNAL MEDICINE
Payer: COMMERCIAL

## 2025-01-29 DIAGNOSIS — Z08 ENCOUNTER FOR FOLLOW-UP SURVEILLANCE OF COLON CANCER: ICD-10-CM

## 2025-01-29 DIAGNOSIS — Z85.038 ENCOUNTER FOR FOLLOW-UP SURVEILLANCE OF COLON CANCER: ICD-10-CM

## 2025-01-29 LAB
CREAT BLD-MCNC: 0.7 MG/DL
EGFRCR SERPLBLD CKD-EPI 2021: 102 ML/MIN/1.73M2 (ref 60–?)

## 2025-01-29 PROCEDURE — 82565 ASSAY OF CREATININE: CPT

## 2025-01-29 PROCEDURE — 71260 CT THORAX DX C+: CPT | Performed by: INTERNAL MEDICINE

## 2025-01-29 PROCEDURE — 74177 CT ABD & PELVIS W/CONTRAST: CPT | Performed by: INTERNAL MEDICINE

## 2025-02-03 ENCOUNTER — HOSPITAL ENCOUNTER (OUTPATIENT)
Dept: ENDOCRINOLOGY | Facility: HOSPITAL | Age: 55
End: 2025-02-03
Attending: INTERNAL MEDICINE
Payer: COMMERCIAL

## 2025-02-03 VITALS — WEIGHT: 134.38 LBS | BODY MASS INDEX: 28 KG/M2

## 2025-02-03 DIAGNOSIS — E11.9 TYPE 2 DIABETES MELLITUS WITHOUT COMPLICATION, WITHOUT LONG-TERM CURRENT USE OF INSULIN (HCC): Primary | ICD-10-CM

## 2025-02-03 NOTE — PATIENT INSTRUCTIONS
Goals       1.  EDC - Activity (pt-stated)       Note created  2/3/2025  3:59 PM by Radha Finley RD      I will walk the dog for 20 minutes daily      2.  EDC - Healthy (pt-stated)       Note created  2/3/2025  3:58 PM by Radha Finley RD      I will try to eat more scheduled meals every 4-5 hours

## 2025-02-03 NOTE — PROGRESS NOTES
Andria Payton : 1970  attended individual initial assessment for Diabetes Education:    Date: 2/3/2025         Start time: 1445 End time: 1545    HEMOGLOBIN A1C (%)   Date Value   2025 11.2 (A)       Wt Readings from Last 1 Encounters:   25 134 lb 6.4 oz       Assessment: pt with DM dx 5 years ago and was started on Metformin but pt stopped taking after 6 months of starting it. Since then, pt has not taken any meds. Pt is here to improve her A1C after her family insisted and per doctor's orders.     BG: Pt currently wears Free style evy sensor given by endo doctor. Current average BG is in the range of 220-250 mg/dL. Also has a BG meter which she will use once the sensor expires.     Meds: Mounjaro 2.5 mg (first dose), Metformin 1000 mg BID, Glipizide, Jardiance. C/o decreased appetite, nausea and diarrhea (now resolved) on Mounjaro.     Exercise: not much at all at this time of the year but usually is more active starting Spring.     Diet Hx: Wake up at 6:30am. Coffee w/milk and sugar (1/2tsp) and a fruit  BF at 10:30-11am: skips at times Omeltte or scrambled eggs w/toast (multigrain white or sourdough) OR tortillas w/beans and chorizo and eggs.  Lunch at 1-2pm: [place] w/ avocado, mozzarella, lettuce with dressing  OR Leftovers   Snack at 5pm: fruit, nuts  Dinner at 6:30 -7pm: meat (chicken, beef), vegetables OR stuffed peppers w/ rice and peppers OR Enchiladas   Drinks: hibiscus tea, tried not to have soda in the house.   Eats out: twice a week usually lunch. Pizza, Italian, Chinese.     Education provided on the below topics:     Diabetes Overview:  Pathophysiology, A1C results and treatment options for diabetes self-management reviewed.   Described basic process and treatment options for diabetes self-management.  Introduced long term impact of uncontrolled blood glucose on health.    Healthy Eating:  Obtained usual diet history.  Instructed on Basic Diet Guidelines which includes eating  3 meals/day. Eat moderate amounts at consistent times from day to day. Limit/avoid sweets. Instructed on Balanced Plate Method of meal planning. Instructed to limit grains or starchy vegetables to ¼ of plate, protein to ¼ of plate, non-starchy vegetables to ½ plate and modest portions of fruit, yogurt, milk as desired.    Being Active:   Encouraged Physical Activity and briefly reviewed ADA recommended guidelines for activity with type 2 diabetes.    Taking Medications:   Reviewed current diabetes medications (if applicable).    Monitoring:  Instructed/reinforced blood glucose monitoring, testing schedules and target goals:   Fasting / Pre-meal  mg/dL 2 Hour Post-prandial <180 mg/dL  Demonstrated ability to perform blood glucose testing.     Problem Solving:   Prevention, detection and treatment of acute complications: taught symptoms of hypoglycemia, hyperglycemia, how to treat low blood sugar (Rule of 15) and actions for lowering high blood glucose levels.     Behavior Change:  Initiated individualized goal setting process and will continue to refine throughout class series.    Recommendations:      Monitor blood glucose as directed.  Follow Balance Plate method of meal planning.   Attend all Group Class in series - pt to check with insurance on coverage prior to scheduling.       Written materials provided for all areas covered.  Patient verbalized understanding and all questions answered.    Radha Finley RDN, [facility]

## 2025-02-04 ENCOUNTER — TELEPHONE (OUTPATIENT)
Dept: ENDOCRINOLOGY CLINIC | Facility: CLINIC | Age: 55
End: 2025-02-04

## 2025-02-04 DIAGNOSIS — E11.9 TYPE 2 DIABETES MELLITUS WITHOUT COMPLICATION, WITHOUT LONG-TERM CURRENT USE OF INSULIN (HCC): Primary | ICD-10-CM

## 2025-02-04 NOTE — TELEPHONE ENCOUNTER
Patient states that she started taking Mounjaro on 2/1/25.  She states that after taking medication, diarrhea, vomiting, body weakness, Hot/Cold sweats and dizziness started right after taking the injection.  Patient states that the symptoms are still present.  Please call

## 2025-02-04 NOTE — TELEPHONE ENCOUNTER
Dr. Thornton,     Patient c/o of severe SE from Mounaro 2.5 mg. She took dose 2/1 and started having diarrhea, vomiting, body weakness, Hot/Cold sweats and dizziness. Today, pt has decreased appetite, nausea vomiting. Patient has been drinking protein shake and bread. Emphasized need to hydrate well and to consume bland food. Patient does not wish to continue with Mounjaro. Notified pt that symptoms can persist for up to two weeks. Patient verbalized understanding.     Med Regimen  Metformin 1000 mg twice a day  Glipizide 5 mg twice a day with meals  Mounjaro 2.5 mg, last injection 2/1 Saturday  Lantus 20 units daily -- did not , not covered by insurance   Jardiance 25 mg daily

## 2025-02-04 NOTE — TELEPHONE ENCOUNTER
Patient returned call.   Patient was informed of MCM and she states that she will respond.  Please call

## 2025-02-07 RX ORDER — INSULIN DEGLUDEC 100 U/ML
20 INJECTION, SOLUTION SUBCUTANEOUS NIGHTLY
Qty: 20 ML | Refills: 0 | Status: SHIPPED | OUTPATIENT
Start: 2025-02-07

## 2025-02-07 NOTE — TELEPHONE ENCOUNTER
Raj Renteria ( Inject 20 Units into the skin nightly) not on insurance formulary - basaglar and tresiba covered - please advise -thanks

## 2025-02-18 ENCOUNTER — ANESTHESIA EVENT (OUTPATIENT)
Dept: ENDOSCOPY | Facility: HOSPITAL | Age: 55
End: 2025-02-18
Payer: COMMERCIAL

## 2025-02-18 ENCOUNTER — ANESTHESIA (OUTPATIENT)
Dept: ENDOSCOPY | Facility: HOSPITAL | Age: 55
End: 2025-02-18
Payer: COMMERCIAL

## 2025-02-18 ENCOUNTER — TELEPHONE (OUTPATIENT)
Facility: CLINIC | Age: 55
End: 2025-02-18

## 2025-02-18 ENCOUNTER — HOSPITAL ENCOUNTER (OUTPATIENT)
Facility: HOSPITAL | Age: 55
Setting detail: HOSPITAL OUTPATIENT SURGERY
Discharge: HOME | End: 2025-02-18
Attending: INTERNAL MEDICINE | Admitting: INTERNAL MEDICINE
Payer: COMMERCIAL

## 2025-02-18 VITALS
RESPIRATION RATE: 17 BRPM | WEIGHT: 134 LBS | BODY MASS INDEX: 28.13 KG/M2 | HEART RATE: 77 BPM | SYSTOLIC BLOOD PRESSURE: 106 MMHG | OXYGEN SATURATION: 97 % | DIASTOLIC BLOOD PRESSURE: 68 MMHG | HEIGHT: 58 IN

## 2025-02-18 DIAGNOSIS — Z85.038 PERSONAL HISTORY OF COLON CANCER: ICD-10-CM

## 2025-02-18 LAB — GLUCOSE BLDC GLUCOMTR-MCNC: 83 MG/DL (ref 70–99)

## 2025-02-18 PROCEDURE — 0DJD8ZZ INSPECTION OF LOWER INTESTINAL TRACT, VIA NATURAL OR ARTIFICIAL OPENING ENDOSCOPIC: ICD-10-PCS | Performed by: INTERNAL MEDICINE

## 2025-02-18 PROCEDURE — 45378 DIAGNOSTIC COLONOSCOPY: CPT | Performed by: INTERNAL MEDICINE

## 2025-02-18 RX ORDER — LIDOCAINE HYDROCHLORIDE 10 MG/ML
INJECTION, SOLUTION EPIDURAL; INFILTRATION; INTRACAUDAL; PERINEURAL AS NEEDED
Status: DISCONTINUED | OUTPATIENT
Start: 2025-02-18 | End: 2025-02-18 | Stop reason: SURG

## 2025-02-18 RX ORDER — SODIUM CHLORIDE, SODIUM LACTATE, POTASSIUM CHLORIDE, CALCIUM CHLORIDE 600; 310; 30; 20 MG/100ML; MG/100ML; MG/100ML; MG/100ML
INJECTION, SOLUTION INTRAVENOUS CONTINUOUS
Status: DISCONTINUED | OUTPATIENT
Start: 2025-02-18 | End: 2025-02-18

## 2025-02-18 RX ORDER — NALOXONE HYDROCHLORIDE 0.4 MG/ML
0.08 INJECTION, SOLUTION INTRAMUSCULAR; INTRAVENOUS; SUBCUTANEOUS ONCE AS NEEDED
Status: DISCONTINUED | OUTPATIENT
Start: 2025-02-18 | End: 2025-02-18

## 2025-02-18 RX ADMIN — SODIUM CHLORIDE, SODIUM LACTATE, POTASSIUM CHLORIDE, CALCIUM CHLORIDE: 600; 310; 30; 20 INJECTION, SOLUTION INTRAVENOUS at 14:09:00

## 2025-02-18 RX ADMIN — SODIUM CHLORIDE, SODIUM LACTATE, POTASSIUM CHLORIDE, CALCIUM CHLORIDE: 600; 310; 30; 20 INJECTION, SOLUTION INTRAVENOUS at 13:42:00

## 2025-02-18 RX ADMIN — LIDOCAINE HYDROCHLORIDE 50 MG: 10 INJECTION, SOLUTION EPIDURAL; INFILTRATION; INTRACAUDAL; PERINEURAL at 13:43:00

## 2025-02-18 NOTE — H&P
History & Physical Examination    Patient Name: Andria Payton  MRN: [id number]  [facility]: [id number]  YOB: 1970    Diagnosis: Personal history of colon cancer      Prescriptions Prior to Admission[1]  Current Facility-Administered Medications   Medication Dose Route Frequency    lactated ringers infusion   Intravenous Continuous       Allergies: Allergies[2]    Past Medical History:    Colon cancer (HCC)    Diabetes (HCC)    High blood pressure    High cholesterol    Hx of motion sickness    Visual impairment     Past Surgical History:   Procedure Laterality Date    Cholecystectomy      Colonoscopy N/A 06/01/2020    Procedure: COLONOSCOPY;  Surgeon: Shad Vital MD;  Location: [facility] ENDOSCOPY    Colonoscopy N/A 11/08/2021    Procedure: COLONOSCOPY;  Surgeon: Shad Vital MD;  Location: [facility] ENDOSCOPY    Colonoscopy       Family History   Problem Relation Age of Onset    Colon Cancer Self 51    Hypertension Mother     Thyroid Disorder Mother     Diabetes Father     Cancer Maternal Grandmother 87        stomach ca, non-smoker    Colon Cancer Maternal Aunt 72    Cancer Niece 2        leukemia    Genetic Disease Niece         hemifacial microsomia    Genetic Disease Niece         hemifacial microsomia    Cancer Maternal Cousin Female          unknown primary (dtr of aunt w/colon ca)    Cancer Maternal Cousin Female 59        stomach ca (dtr of aunt w/colon ca)     Social History     Tobacco Use    Smoking status: Never     Passive exposure: Never    Smokeless tobacco: Never   Substance Use Topics    Alcohol use: Yes     Alcohol/week: 1.0 standard drink of alcohol     Types: 1 Cans of beer per week     Comment: rare       SYSTEM Check if Review is Normal Check if Physical Exam is Normal If not normal, please explain:   HEENT [X ] [ X]    NECK  [X ] [ X]    HEART [X ] [ X]    LUNGS [X ] [ X]    ABDOMEN [X ] [ X]    EXTREMITIES [X ] [ X]    OTHER        [ x ] I have discussed the risks and benefits  and alternatives with the patient/family.  They understand and agree to proceed with plan of care.  [ x ] I have reviewed the History and Physical done within the last 30 days.  Any changes noted above.    Shad Vital MD  2025  1:33 PM             [1]   Medications Prior to Admission   Medication Sig Dispense Refill Last Dose/Taking    insulin degludec (TRESIBA FLEXTOUCH) 100 UNIT/ML Subcutaneous Solution Pen-injector Inject 20 Units into the skin nightly. 20 mL 0 Taking    metFORMIN  MG Oral Tablet 24 Hr Take 2 tablets (1,000 mg total) by mouth 2 (two) times daily with meals. 360 tablet 1 2025    empagliflozin (JARDIANCE) 25 MG Oral Tab Take 1 tablet (25 mg total) by mouth daily. 90 tablet 0 2025    glipiZIDE 5 MG Oral Tab Take 1 tablet (5 mg total) by mouth 2 (two) times daily before meals. 180 tablet 0 2025    atorvastatin 20 MG Oral Tab Take 1 tablet (20 mg total) by mouth nightly. 90 tablet 0 2025    metoprolol tartrate 25 MG Oral Tab Take 1 tablet (25 mg total) by mouth 2x Daily(Beta Blocker). 180 tablet 1 2025    cetirizine 10 MG Oral Tab Take 1 tablet (10 mg total) by mouth as needed for Allergies.   2025    [] Continuous Glucose  (FREESTYLE PANCHITO 3 READER) Does not apply Misc 1 each once for 1 dose. 1 each 0     Continuous Glucose Sensor (FREESTYLE PANCHITO 3 PLUS SENSOR) Does not apply Misc 1 each every 14 (fourteen) days. To check glucose 6 each 0     [START ON 3/24/2025] Tirzepatide (MOUNJARO) 7.5 MG/0.5ML Subcutaneous Solution Auto-injector Inject 7.5 mg into the skin every 7 days. (Patient not taking: Reported on 2025) 6 mL 0     [START ON 2025] Tirzepatide (MOUNJARO) 5 MG/0.5ML Subcutaneous Solution Auto-injector Inject 5 mg into the skin every 7 days. (Patient not taking: Reported on 2025) 2 mL 0     Tirzepatide (MOUNJARO) 2.5 MG/0.5ML Subcutaneous Solution Auto-injector Inject 2.5 mg into the skin every 7 days. (Patient not  taking: Reported on 2/6/2025) 2 mL 0     insulin glargine (LANTUS SOLOSTAR) 100 UNIT/ML Subcutaneous Solution Pen-injector Inject 20 Units into the skin nightly. (Patient not taking: Reported on 2/6/2025) 18 mL 0     Insulin Pen Needle (PEN NEEDLES) 32G X 6 MM Does not apply Misc 1 each daily. 100 each 2     PEG 3350-KCl-Na Bicarb-NaCl (GAVILYTE-N WITH FLAVOR PACK) 420 g Oral Recon Soln Split dose. Please follow [facility] bowel prep instructions. 1 each 0    [2]   Allergies  Allergen Reactions    Codeine HIVES and NAUSEA ONLY    Seasonal OTHER (SEE COMMENTS)     Runny nose, sneezing, itchy eyes

## 2025-02-18 NOTE — DISCHARGE INSTRUCTIONS
Home Care Instructions for Colonoscopy with Sedation    Diet:  - Resume your regular diet as tolerated unless otherwise instructed.  - Start with light meals to minimize bloating.  - Do not drink alcohol today.    Medication:  - If you have questions about resuming your normal medications, please contact your Primary Care Physician.    Activities:  - Take it easy today. Do not return to work today.  - Do not drive today.  - Do not operate any machinery today (including kitchen equipment).  -   Do not make any critical decisions or sign any paperwork.  - Do not exercise today.    Colonoscopy:  - You may notice some rectal \"spotting\" (a little blood on the toilet tissue) for a day or two after the exam. This is normal.  - If you experience any rectal bleeding (not spotting), persistent tenderness or sharp severe abdominal pains, oral temperature over 100 degrees Fahrenheit, light-headedness or dizziness, or any other problems, contact your doctor.      **If unable to reach your doctor, please go to the [facility] Emergency Room**    - Your referring physician will receive a full report of your examination.  - If you do not hear from your doctor's office within two weeks of your biopsy, please call them for your results.    You may be able to see your laboratory results in [employer] between 4 and 7 business days.  In some cases, your physician may not have viewed the results before they are released to [employer].  If you have questions regarding your results contact the physician who ordered the test/exam by phone or via [employer] by choosing \"Ask a Medical Question.\"

## 2025-02-18 NOTE — OPERATIVE REPORT
[facility] Endoscopy Report      Date of Procedure:  02/18/25      Preoperative Diagnosis:  Personal history of colon cancer      Postoperative Diagnosis:  Pancolonic diverticulosis      Procedure:    Colonoscopy       Surgeon:  Shad Vital M.D.      Anesthesia:  Monitored anesthesia care  Cecal withdrawal time: 16 minutes  EBL: None      Brief History:  This is a 54 year old female who presents for a surveillance colonoscopy in the setting of a history of a 14 cm poorly differentiated pT4a N0 adenocarcinoma of the right colon resected in June 2020.  Genetic testing was negative with the exception of an MSH6 somatic mutation.  The patient received adjuvant FOLFOX chemotherapy and has been JANICE.  She remains asymptomatic.  Her last colonoscopy was almost 3-1/2 years prior.      Technique:  After informed consent, the patient was placed in the left lateral recumbent position.  Digital rectal examination revealed no palpable intraluminal abnormalities.  Sphincter tone was diminished.  An Olympus variable stiffness 190 series HD colonoscope was inserted into the rectum and advanced under direct vision by following the lumen to the patient's ileocolonic anastomosis and briefly into the nunu-terminal ileum.  The colon was examined upon withdrawal in the left lateral recumbent position.      Findings:  The preparation of the colon was good.  There was evidence of a prior right hemicolectomy with a widely patent.  A brief glimpse of the nuun-terminal ileum was normal.  Lymphoid hyperplasia was present.  The visualized colonic mucosa from the anastomosis to the anal verge was normal with an intact vascular pattern.  Diverticular was seen from the transverse colon to the sigmoid most numerous in the latter without current signs of complication.  There were no colonic polyps, mass lesions, vascular anomalies or signs of inflammation seen.  Retroflexion in the rectum (difficult due to inability to retain  insufflated air)  revealed no abnormalities.  The procedure was well tolerated without immediate complication.      Impression:  1.  Uncomplicated colonic diverticulosis as described above  2.  Normal ileocolonic anastomosis    Recommendations:  Based on the patient's aggressive tumor and MSH6 somatic mutation, I would recommend a surveillance colonoscopy in 3 years.        Shad Vital MD  2/18/2025  1:33 PM

## 2025-02-18 NOTE — ANESTHESIA PREPROCEDURE EVALUATION
Anesthesia PreOp Note    HPI:     Andria Payton is a 54 year old female who presents for preoperative consultation requested by: Shad Vital MD    Date of Surgery: 2/18/2025    Procedure(s):  COLONOSCOPY  Indication: Personal history of colon cancer    Relevant Problems   No relevant active problems       NPO:  Last Liquid Consumption Date: 02/18/25  Last Liquid Consumption Time: 1000  Last Solid Consumption Date: 02/17/25  Last Solid Consumption Time: 1100  Last Liquid Consumption Date: 02/18/25          History Review:  Patient Active Problem List    Diagnosis Date Noted    Seasonal allergies 10/23/2024    Chemotherapy-induced neuropathy (HCC) 02/18/2021    Acute cystitis without hematuria 08/16/2020    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    Type 2 diabetes mellitus without complication, without long-term current use of insulin (HCC) 08/10/2020    Hypokalemia 07/16/2020    Nausea & vomiting 07/15/2020    Hypovolemia 07/15/2020    Iron deficiency anemia 06/29/2020    Family history of colon cancer 06/22/2020    Malignant neoplasm of ascending colon (HCC) 06/14/2020    Mixed hyperlipidemia 03/20/2020       Past Medical History:    Colon cancer (HCC)    Diabetes (HCC)    High blood pressure    High cholesterol    Hx of motion sickness    Visual impairment       Past Surgical History:   Procedure Laterality Date    Cholecystectomy      Colonoscopy N/A 06/01/2020    Procedure: COLONOSCOPY;  Surgeon: Shad Vital MD;  Location: [facility] ENDOSCOPY    Colonoscopy N/A 11/08/2021    Procedure: COLONOSCOPY;  Surgeon: Shad Vital MD;  Location: [facility] ENDOSCOPY    Colonoscopy         Prescriptions Prior to Admission[1]  Current Medications and Prescriptions Ordered in Epic[2]    Allergies[3]    Family History   Problem Relation Age of Onset    Colon Cancer Self 51    Hypertension Mother     Thyroid Disorder Mother     Diabetes Father     Cancer Maternal Grandmother 87        stomach ca, non-smoker     Colon Cancer Maternal Aunt 72    Cancer Niece 2        leukemia    Genetic Disease Niece         hemifacial microsomia    Genetic Disease Niece         hemifacial microsomia    Cancer Maternal Cousin Female          unknown primary (dtr of aunt w/colon ca)    Cancer Maternal Cousin Female 59        stomach ca (dtr of aunt w/colon ca)     Social History     Socioeconomic History    Marital status:    Tobacco Use    Smoking status: Never     Passive exposure: Never    Smokeless tobacco: Never   Vaping Use    Vaping status: Never Used   Substance and Sexual Activity    Alcohol use: Yes     Alcohol/week: 1.0 standard drink of alcohol     Types: 1 Cans of beer per week     Comment: rare    Drug use: Never    Sexual activity: Yes     Partners: Male       Available pre-op labs reviewed.             Vital Signs:  Body mass index is 28.01 kg/m².   height is 1.473 m (4' 10\") and weight is 60.8 kg (134 lb).   Vitals:    02/13/25 1338   Weight: 60.8 kg (134 lb)   Height: 1.473 m (4' 10\")        Anesthesia Evaluation      No history of anesthetic complications   Airway   Mallampati: II  TM distance: <3 FB  Neck ROM: full  Dental - Dentition appears grossly intact     Pulmonary - negative ROS and normal exam   Cardiovascular - normal exam  (+) hypertension    ECG reviewed    Neuro/Psych - negative ROS     GI/Hepatic/Renal    (+) bowel prep    Comments: Hx of colon ca    Endo/Other    (+) diabetes mellitus  Abdominal   (+) obese                 Anesthesia Plan:   ASA:  3  Plan:   MAC  Informed Consent Plan and Risks Discussed With:  Patient  Blood Product Use Consented    Discussed plan with:  Surgeon      I have informed Andria DIEGO Payton and/or legal guardian or family member of the nature of the anesthetic plan, benefits, risks including possible dental damage if relevant, major complications, and any alternative forms of anesthetic management.   All of the patient's questions were answered to the best of my ability. The  patient desires the anesthetic management as planned.  Dian Lazo, CRNA  2025 1:06 PM  Present on Admission:  **None**           [1]   Medications Prior to Admission   Medication Sig Dispense Refill Last Dose/Taking    insulin degludec (TRESIBA FLEXTOUCH) 100 UNIT/ML Subcutaneous Solution Pen-injector Inject 20 Units into the skin nightly. 20 mL 0 Taking    metFORMIN  MG Oral Tablet 24 Hr Take 2 tablets (1,000 mg total) by mouth 2 (two) times daily with meals. 360 tablet 1 Taking    empagliflozin (JARDIANCE) 25 MG Oral Tab Take 1 tablet (25 mg total) by mouth daily. 90 tablet 0 Taking    glipiZIDE 5 MG Oral Tab Take 1 tablet (5 mg total) by mouth 2 (two) times daily before meals. 180 tablet 0 Taking    atorvastatin 20 MG Oral Tab Take 1 tablet (20 mg total) by mouth nightly. 90 tablet 0 Taking    metoprolol tartrate 25 MG Oral Tab Take 1 tablet (25 mg total) by mouth 2x Daily(Beta Blocker). 180 tablet 1 Taking    cetirizine 10 MG Oral Tab Take 1 tablet (10 mg total) by mouth as needed for Allergies.   Taking As Needed    [] Continuous Glucose  (FREESTYLE PANCHITO 3 READER) Does not apply Misc 1 each once for 1 dose. 1 each 0     Continuous Glucose Sensor (FREESTYLE PANCHITO 3 PLUS SENSOR) Does not apply Misc 1 each every 14 (fourteen) days. To check glucose 6 each 0     [START ON 3/24/2025] Tirzepatide (MOUNJARO) 7.5 MG/0.5ML Subcutaneous Solution Auto-injector Inject 7.5 mg into the skin every 7 days. (Patient not taking: Reported on 2025) 6 mL 0     [START ON 2025] Tirzepatide (MOUNJARO) 5 MG/0.5ML Subcutaneous Solution Auto-injector Inject 5 mg into the skin every 7 days. (Patient not taking: Reported on 2025) 2 mL 0     Tirzepatide (MOUNJARO) 2.5 MG/0.5ML Subcutaneous Solution Auto-injector Inject 2.5 mg into the skin every 7 days. (Patient not taking: Reported on 2025) 2 mL 0     insulin glargine (LANTUS SOLOSTAR) 100 UNIT/ML Subcutaneous Solution Pen-injector Inject 20  Units into the skin nightly. (Patient not taking: Reported on 2/6/2025) 18 mL 0     Insulin Pen Needle (PEN NEEDLES) 32G X 6 MM Does not apply Misc 1 each daily. 100 each 2     PEG 3350-KCl-Na Bicarb-NaCl (GAVILYTE-N WITH FLAVOR PACK) 420 g Oral Recon Soln Split dose. Please follow [facility] bowel prep instructions. 1 each 0    [2]   Current Facility-Administered Medications Ordered in Epic   Medication Dose Route Frequency Provider Last Rate Last Admin    lactated ringers infusion   Intravenous Continuous Shad Vital MD         No current [facility]-ordered outpatient medications on file.   [3]   Allergies  Allergen Reactions    Codeine HIVES and NAUSEA ONLY    Seasonal OTHER (SEE COMMENTS)     Runny nose, sneezing, itchy eyes

## 2025-02-18 NOTE — TELEPHONE ENCOUNTER
Health maintenance updated.     Last colonoscopy done 2/18/2025 by Dr Vital    Recall placed into Pt Outreach, next due on 2/2028 per Dr Vital.

## 2025-02-18 NOTE — ANESTHESIA POSTPROCEDURE EVALUATION
Patient: Andria Payton    Procedure Summary       Date: 02/18/25 Room / Location: [facility] ENDOSCOPY 04 / [facility] ENDOSCOPY    Anesthesia Start: 1341 Anesthesia Stop: 1416    Procedure: COLONOSCOPY Diagnosis:       Personal history of colon cancer      (diverticulosis)    Surgeons: Shad Vital MD Anesthesiologist: Dian Lazo CRNA    Anesthesia Type: MAC ASA Status: 3            Anesthesia Type: No value filed.    Vitals Value Taken Time   BP 82/35 02/18/25 1414   Temp 97 02/18/25 1416   Pulse 77 02/18/25 1415   Resp 18 02/18/25 1415   SpO2 97 % 02/18/25 1415   Vitals shown include unfiled device data.    [facility] AN Post Evaluation:   Patient Evaluated in PACU  Patient Participation: waiting for patient participation  Level of Consciousness: responsive to painful stimuli  Pain Score: 0  Pain Management: adequate  Airway Patency:patent  Dental exam unchanged from preop  Yes    Nausea/Vomiting: none  Cardiovascular Status: stable  Respiratory Status: room air and spontaneous ventilation  Postoperative Hydration stable      Dian Lazo CRNA  2/18/2025 2:16 PM

## 2025-02-26 ENCOUNTER — OFFICE VISIT (OUTPATIENT)
Dept: INTERNAL MEDICINE CLINIC | Facility: CLINIC | Age: 55
End: 2025-02-26

## 2025-02-26 VITALS
HEART RATE: 91 BPM | HEIGHT: 58 IN | BODY MASS INDEX: 28.42 KG/M2 | TEMPERATURE: 97 F | WEIGHT: 135.38 LBS | DIASTOLIC BLOOD PRESSURE: 87 MMHG | SYSTOLIC BLOOD PRESSURE: 130 MMHG | OXYGEN SATURATION: 100 %

## 2025-02-26 DIAGNOSIS — E11.9 TYPE 2 DIABETES MELLITUS WITHOUT COMPLICATION, WITH LONG-TERM CURRENT USE OF INSULIN (HCC): Primary | ICD-10-CM

## 2025-02-26 DIAGNOSIS — Z79.4 TYPE 2 DIABETES MELLITUS WITHOUT COMPLICATION, WITH LONG-TERM CURRENT USE OF INSULIN (HCC): Primary | ICD-10-CM

## 2025-02-26 PROCEDURE — 99213 OFFICE O/P EST LOW 20 MIN: CPT | Performed by: INTERNAL MEDICINE

## 2025-02-26 RX ORDER — INSULIN DEGLUDEC 100 U/ML
20 INJECTION, SOLUTION SUBCUTANEOUS NIGHTLY
Qty: 20 ML | Refills: 0 | Status: SHIPPED | OUTPATIENT
Start: 2025-02-26

## 2025-02-26 RX ORDER — ATORVASTATIN CALCIUM 20 MG/1
20 TABLET, FILM COATED ORAL NIGHTLY
Qty: 90 TABLET | Refills: 0 | Status: SHIPPED | OUTPATIENT
Start: 2025-02-26

## 2025-02-26 NOTE — PROGRESS NOTES
Subjective:     Patient ID: Andria Payton is a 54 year old female.    HPI    History/Other:   She came in today for follow-up.  According to the patient she had allergic reaction to Mounjaro.  Taking tresiba 20 units, metformin and Jardiance.    She denies any complaints she has scheduled appointment with ophthalmology    Review of Systems   Constitutional: Negative.    HENT: Negative.     Eyes: Negative.    Respiratory: Negative.     Cardiovascular: Negative.    Gastrointestinal: Negative.    Genitourinary: Negative.    Musculoskeletal: Negative.    Skin: Negative.    Neurological: Negative.    Hematological: Negative.    Psychiatric/Behavioral: Negative.       Current Outpatient Medications   Medication Sig Dispense Refill    Continuous Glucose Sensor (FREESTYLE PANCHITO 3 PLUS SENSOR) Does not apply Misc 1 each every 14 (fourteen) days. To check glucose 6 each 0    metFORMIN  MG Oral Tablet 24 Hr Take 2 tablets (1,000 mg total) by mouth 2 (two) times daily with meals. 360 tablet 1    empagliflozin (JARDIANCE) 25 MG Oral Tab Take 1 tablet (25 mg total) by mouth daily. 90 tablet 0    Insulin Pen Needle (PEN NEEDLES) 32G X 6 MM Does not apply Misc 1 each daily. 100 each 2    glipiZIDE 5 MG Oral Tab Take 1 tablet (5 mg total) by mouth 2 (two) times daily before meals. 180 tablet 0    atorvastatin 20 MG Oral Tab Take 1 tablet (20 mg total) by mouth nightly. 90 tablet 0    metoprolol tartrate 25 MG Oral Tab Take 1 tablet (25 mg total) by mouth 2x Daily(Beta Blocker). 180 tablet 1    cetirizine 10 MG Oral Tab Take 1 tablet (10 mg total) by mouth as needed for Allergies.      insulin degludec (TRESIBA FLEXTOUCH) 100 UNIT/ML Subcutaneous Solution Pen-injector Inject 20 Units into the skin nightly. (Patient not taking: Reported on 2/26/2025) 20 mL 0    [START ON 3/24/2025] Tirzepatide (MOUNJARO) 7.5 MG/0.5ML Subcutaneous Solution Auto-injector Inject 7.5 mg into the skin every 7 days. (Patient not taking: Reported on  2/26/2025) 6 mL 0    Tirzepatide (MOUNJARO) 5 MG/0.5ML Subcutaneous Solution Auto-injector Inject 5 mg into the skin every 7 days. (Patient not taking: Reported on 2/26/2025) 2 mL 0    Tirzepatide (MOUNJARO) 2.5 MG/0.5ML Subcutaneous Solution Auto-injector Inject 2.5 mg into the skin every 7 days. (Patient not taking: Reported on 2/26/2025) 2 mL 0    insulin glargine (LANTUS SOLOSTAR) 100 UNIT/ML Subcutaneous Solution Pen-injector Inject 20 Units into the skin nightly. (Patient not taking: Reported on 2/26/2025) 18 mL 0     Allergies:Allergies[1]    Past Medical History:    Colon cancer (HCC)    Diabetes (HCC)    High blood pressure    High cholesterol    Hx of motion sickness    Visual impairment      Past Surgical History:   Procedure Laterality Date    Cholecystectomy      Colonoscopy N/A 06/01/2020    Procedure: COLONOSCOPY;  Surgeon: Shad Vital MD;  Location: [facility] ENDOSCOPY    Colonoscopy N/A 11/08/2021    Procedure: COLONOSCOPY;  Surgeon: Shad Vital MD;  Location: [facility] ENDOSCOPY    Colonoscopy      Colonoscopy  02/18/2025    Dr. Vital; diverticulosis    Colonoscopy N/A 2/18/2025    Procedure: COLONOSCOPY;  Surgeon: Shad Vital MD;  Location: [facility] ENDOSCOPY      Family History   Problem Relation Age of Onset    Colon Cancer Self 50    Hypertension Mother     Thyroid Disorder Mother     Diabetes Father     Cancer Maternal Grandmother 87        stomach ca, non-smoker    Colon Cancer Maternal Aunt 72    Cancer Niece 2        leukemia    Genetic Disease Niece         hemifacial microsomia    Genetic Disease Niece         hemifacial microsomia    Cancer Maternal Cousin Female          unknown primary (dtr of aunt w/colon ca)    Cancer Maternal Cousin Female 59        stomach ca (dtr of aunt w/colon ca)      Social History:   Social History     Socioeconomic History    Marital status:    Tobacco Use    Smoking status: Never     Passive exposure: Never    Smokeless tobacco:  Never   Vaping Use    Vaping status: Never Used   Substance and Sexual Activity    Alcohol use: Yes     Alcohol/week: 1.0 standard drink of alcohol     Types: 1 Cans of beer per week     Comment: rare    Drug use: Never    Sexual activity: Yes     Partners: Male   Social History Narrative    Andria is . She has 2 adult children. 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 She lives in [place], [place].     Social Drivers of Health     Food Insecurity: No Food Insecurity (11/27/2024)    NCSS - Food Insecurity     Worried About Running Out of Food in the Last Year: No     Ran Out of Food in the Last Year: No   Transportation Needs: No Transportation Needs (11/27/2024)    NCSS - Transportation     Lack of Transportation: No   Housing Stability: Not At Risk (11/27/2024)    NCSS - Housing/Utilities     Has Housing: Yes     Worried About Losing Housing: No     Unable to Get Utilities: No        Objective:   Physical Exam  Vitals and nursing note reviewed.   Constitutional:       Appearance: Normal appearance.   HENT:      Head: Normocephalic and atraumatic.   Cardiovascular:      Rate and Rhythm: Normal rate and regular rhythm.      Pulses: Normal pulses.      Heart sounds: Normal heart sounds.   Pulmonary:      Effort: Pulmonary effort is normal.      Breath sounds: Normal breath sounds.   Abdominal:      Palpations: Abdomen is soft.   Musculoskeletal:         General: Normal range of motion.      Cervical back: Normal range of motion and neck supple.   Skin:     General: Skin is warm.   Neurological:      Mental Status: She is alert. Mental status is at baseline.   Psychiatric:         Mood and Affect: Mood normal.         Assessment & Plan:   No diagnosis found.  Diabetes mellitus monitor blood sugar, continue with current medication, follow-up with endocrinology, will get records from ophthalmology  No orders of the defined types were placed in this encounter.      Meds This Visit:  Requested Prescriptions     Pending  Prescriptions Disp Refills    atorvastatin 20 MG Oral Tab 90 tablet 0     Sig: Take 1 tablet (20 mg total) by mouth nightly.       Imaging & Referrals:  None            [1]   Allergies  Allergen Reactions    Codeine HIVES and NAUSEA ONLY    Seasonal OTHER (SEE COMMENTS)     Runny nose, sneezing, itchy eyes

## 2025-02-27 LAB
CREAT UR-SCNC: 49.1 MG/DL
MICROALBUMIN UR-MCNC: <0.3 MG/DL

## 2025-04-28 ENCOUNTER — OFFICE VISIT (OUTPATIENT)
Dept: ENDOCRINOLOGY CLINIC | Facility: CLINIC | Age: 55
End: 2025-04-28

## 2025-04-28 VITALS
HEART RATE: 106 BPM | HEIGHT: 58 IN | DIASTOLIC BLOOD PRESSURE: 72 MMHG | BODY MASS INDEX: 27.71 KG/M2 | WEIGHT: 132 LBS | SYSTOLIC BLOOD PRESSURE: 118 MMHG

## 2025-04-28 DIAGNOSIS — T45.1X5A CHEMOTHERAPY-INDUCED NEUROPATHY (HCC): ICD-10-CM

## 2025-04-28 DIAGNOSIS — G62.0 CHEMOTHERAPY-INDUCED NEUROPATHY (HCC): ICD-10-CM

## 2025-04-28 DIAGNOSIS — E78.2 MIXED HYPERLIPIDEMIA: ICD-10-CM

## 2025-04-28 DIAGNOSIS — R73.09 HIGH GLUCOSE LEVEL: ICD-10-CM

## 2025-04-28 DIAGNOSIS — C18.2 MALIGNANT NEOPLASM OF ASCENDING COLON (HCC): ICD-10-CM

## 2025-04-28 DIAGNOSIS — E11.9 TYPE 2 DIABETES MELLITUS WITHOUT COMPLICATION, WITHOUT LONG-TERM CURRENT USE OF INSULIN (HCC): ICD-10-CM

## 2025-04-28 LAB
GLUCOSE BLOOD: 171
HEMOGLOBIN A1C: 7.9 % (ref 4.3–5.6)
TEST STRIP LOT #: NORMAL NUMERIC

## 2025-04-28 RX ORDER — METFORMIN HYDROCHLORIDE 500 MG/1
1000 TABLET, EXTENDED RELEASE ORAL 2 TIMES DAILY WITH MEALS
Qty: 360 TABLET | Refills: 1 | Status: SHIPPED | OUTPATIENT
Start: 2025-04-28 | End: 2025-10-25

## 2025-04-28 RX ORDER — GLIPIZIDE 5 MG/1
5 TABLET ORAL
Qty: 180 TABLET | Refills: 0 | Status: SHIPPED | OUTPATIENT
Start: 2025-04-28

## 2025-04-28 RX ORDER — ACYCLOVIR 400 MG/1
1 TABLET ORAL
Qty: 9 EACH | Refills: 1 | Status: SHIPPED | OUTPATIENT
Start: 2025-04-28

## 2025-04-28 NOTE — PATIENT INSTRUCTIONS
2025 b A1c: 7.9    3 mo rtc    Metformi  1000 mg twice day.   Glipizide 5 mg twice a day with meals - skip the dose if not eating. Take 10 mg for high carb meals  Will rechallenge mounjaro later   On Jardiance 25 mg   The plan is to decrease and stop insulin and glipizide later if BG is controlled on metformin, jardiance and mounjaro   Lantus 20 --> 18 units - will stop it later when we do not need it later   On dexcom  CGM/Continuous Glucose Monitors samples   UTD on eye exam   If you have low blood sugar <70, take 15 grams of carb (8 oz juice or regular soda) and recheck in 15 minutes.    Follow up with podiatry and eye doctor annually.   Patients need to wear covered shoes all the time and check feet daily.   Bring your meter/BG log to the next visit.      Target A1c 6.5%  Target BG   BEFORE MEAL 100-130mg/dl  2hrs AFTER MEAL less than 180mg/dl    GLP-1 agonists:  No personal or family history of MEN syndrome   No personal history pancreatitis   Patient counselled regarding side effects including injection site reactions, nausea, vomiting, diarrhea, pancreatitis, gastroparesis and rare side effect felipe Sebastian syndrome.    SGLT2 inhibitors  No UTI or yeast infection   Discussed side effects including UTI and fungal infections.   Discussed the importance of hydration.    Wt Readings from Last 6 Encounters:   25 132 lb (59.9 kg)   25 135 lb 6.4 oz (61.4 kg)   25 134 lb (60.8 kg)   25 134 lb 6.4 oz (61 kg)   25 137 lb (62.1 kg)   24 138 lb (62.6 kg)

## 2025-04-28 NOTE — PROGRESS NOTES
Reason for Visit:  uncontrolled DM  Requesting Physician:   ..MD Rafiq CUEVAS Arlinda, MD  [address] [place] [id number]   CHIEF COMPLAINT:    Chief Complaint   Patient presents with    Diabetes     F/u        HISTORY OF PRESENT ILLNESS:   Andria Payton is a 54 year old female who presents with uncontrolled DM  She has hx of colon cancer in 2020  s/p surgery and chemo  , no radiation . Cancer free now   2025 b A1c: 11.2  Had severe SE from mounjaro so stopped it   She changed lifestyle   Walking more   Stopped soda   Taking meds as rec'   DM HISTORY  Diagnosed:        CURRENT DIABETIC MEDICATIONS INCLUDE:   Tresiba 20 units at bedtime   Jardiance   Metformin 1000 mg bid   Glipizide 5 mg before meals w/ meals     HISTORY OF DIABETES COMPLICATIONS: :  History of Retinopathy: 2025   History of Neuropathy:  no  History of Nephropathy: no    ASSOCIATED COMPLICATIONS:   HTN:  BP Meds: metoprolol tartrate Tabs - 25 MG     Hyperlipidemia: Cholesterol Meds: atorvastatin Tabs - 20 MG     CAD/ASCVD/PAD/CVA:  no    HOME GLUCOSE READINGS:     False hypoglycemia on CGM w/o symptoms     Diet   Meals 2  Snacks fruits   Drinks water,     EXERCISE:  no    Lab Results   Component Value Date    A1C 11.2 (A) 2025    A1C 12.3 (H) 10/23/2024    A1C 7.6 (H) 2020        DM quality measures:  A1C/Blood pressure: as reported above   Last dilated eye exam: Last Dilated Eye Exam: 25     Exam shows retinopathy? Eye Exam shows Diabetic Retinopathy?: No    Last diabetic foot exam: Last Foot Exam: 10/23/24    Dentist : recommend every 6m  Nephropathy screening:   ace /arb rx:     LIPID screening: Cholesterol Meds: atorvastatin Tabs - 20 MG     Cholesterol: 225, done on 10/23/2024.  HDL Cholesterol: 55, done on 10/23/2024.  TriGlycerides 172, done on 10/23/2024.  LDL Cholesterol: 139, done on 10/23/2024.     Micro Albumen/Creatinine:    Lab Results   Component Value Date    MICROALBCREA   2025      Comment:      Unable to calculate due to Urine Microalbumin <0.3 mg/dL        MICROALBCREA 21.3 10/23/2024         ASSESSMENT AND PLAN:  Andria Payton is a 54 year old female who presents with uncontrolled DM , HTN, DLP,  and Colon cancer 2020  s/p surgery and chemo  , no radiation . Cancer free now   Has neuropathy from chemo       Plan  2025 b A1c: 7.9    3 mo rtc    Metformin  1000 mg twice day.   Glipizide 5 mg twice a day with meals - skip the dose if not eating. Take 10 mg for high carb meals  Will rechallenge mounjaro later   On Jardiance 25 mg   The plan is to decrease and stop insulin and glipizide later if BG is controlled on metformin, jardiance and mounjaro   Lantus 20 --> 18 units - will stop it later when we do not need it later   On dexcom  CGM/Continuous Glucose Monitors samples   UTD on eye exam   If you have low blood sugar <70, take 15 grams of carb (8 oz juice or regular soda) and recheck in 15 minutes.    Follow up with podiatry and eye doctor annually.   Patients need to wear covered shoes all the time and check feet daily.   Bring your meter/BG log to the next visit.      Target A1c 6.5%  Target BG   BEFORE MEAL 100-130mg/dl  2hrs AFTER MEAL less than 180mg/dl    GLP-1 agonists:  No personal or family history of MEN syndrome   No personal history pancreatitis   Patient counselled regarding side effects including injection site reactions, nausea, vomiting, diarrhea, pancreatitis, gastroparesis and rare side effect felipe Sebastian syndrome.    SGLT2 inhibitors  No UTI or yeast infection   Discussed side effects including UTI and fungal infections.   Discussed the importance of hydration.    Wt Readings from Last 6 Encounters:   25 132 lb (59.9 kg)   25 135 lb 6.4 oz (61.4 kg)   25 134 lb (60.8 kg)   25 134 lb 6.4 oz (61 kg)   25 137 lb (62.1 kg)   24 138 lb (62.6 kg)              We discussed these in detail with the patient and  negotiated which of numerous possible changes in the in the treatment plan that would be acceptable to them. The patient remains at ongoing is at high risk for complications related to uncontrolled diabetes and treatment.  The patient requires a great deal of self-management and support. We expect the patient's risk to be reduced with the changes to the treatment plan that we recommended today.   We reviewed a very large amount of complicated data including BG target range, basal insulin doses       PAST MEDICAL HISTORY:   Past Medical History:    Colon cancer (HCC)    Diabetes (HCC)    High blood pressure    High cholesterol    Hx of motion sickness    Visual impairment     Colon cancer June 2020  s/p surgery and chemo  , no radiation . Cancer free now     PAST SURGICAL HISTORY:   Past Surgical History:   Procedure Laterality Date    Cholecystectomy      Colonoscopy N/A 06/01/2020    Procedure: COLONOSCOPY;  Surgeon: Shad Vital MD;  Location: [facility] ENDOSCOPY    Colonoscopy N/A 11/08/2021    Procedure: COLONOSCOPY;  Surgeon: Shad Vital MD;  Location: [facility] ENDOSCOPY    Colonoscopy      Colonoscopy  02/18/2025    Dr. Vital; diverticulosis    Colonoscopy N/A 2/18/2025    Procedure: COLONOSCOPY;  Surgeon: Shad Vital MD;  Location: [facility] ENDOSCOPY       CURRENT MEDICATIONS:     metFORMIN  MG Oral Tablet 24 Hr Take 2 tablets (1,000 mg total) by mouth 2 (two) times daily with meals. 360 tablet 1    glipiZIDE 5 MG Oral Tab Take 1 tablet (5 mg total) by mouth 2 (two) times daily before meals. 180 tablet 0    Continuous Glucose Sensor (DEXCOM G7 SENSOR) Does not apply Misc 1 each Every 10 days. Use as directed every 10 days 9 each 1    empagliflozin (JARDIANCE) 25 MG Oral Tab Take 1 tablet (25 mg total) by mouth daily. 90 tablet 1    atorvastatin 20 MG Oral Tab Take 1 tablet (20 mg total) by mouth nightly. 90 tablet 0    insulin degludec (TRESIBA FLEXTOUCH) 100 UNIT/ML Subcutaneous  Solution Pen-injector Inject 20 Units into the skin nightly. 20 mL 0    metoprolol tartrate 25 MG Oral Tab Take 1 tablet (25 mg total) by mouth 2x Daily(Beta Blocker). 180 tablet 1       ALLERGIES:  Allergies[1]    SOCIAL HISTORY:    Social History     Socioeconomic History    Marital status:    Tobacco Use    Smoking status: Never     Passive exposure: Never    Smokeless tobacco: Never   Vaping Use    Vaping status: Never Used   Substance and Sexual Activity    Alcohol use: Yes     Alcohol/week: 1.0 standard drink of alcohol     Types: 1 Cans of beer per week     Comment: rare    Drug use: Never    Sexual activity: Yes     Partners: Male     Not working   No smoking, marijuana  Etoh  or Drugs     FAMILY HISTORY:   Family History   Problem Relation Age of Onset    Colon Cancer Self 51    Hypertension Mother     Thyroid Disorder Mother     Diabetes Father     Cancer Maternal Grandmother 87        stomach ca, non-smoker    Colon Cancer Maternal Aunt 72    Cancer Niece 2        leukemia    Genetic Disease Niece         hemifacial microsomia    Genetic Disease Niece         hemifacial microsomia    Cancer Maternal Cousin Female          unknown primary (dtr of aunt w/colon ca)    Cancer Maternal Cousin Female 59        stomach ca (dtr of aunt w/colon ca)         PHYSICAL EXAM:   Height: 4' 10\" (147.3 cm) (04/28 1027)  Weight: 132 lb (59.9 kg) (04/28 1027)  BSA (Calculated - sq m): 1.53 sq meters (04/28 1027)  Pulse: 106 (04/28 1027)  BP: 118/72 (04/28 1027)  Temp: --  Do Not Use - Resp Rate: --  SpO2: --    Body mass index is 27.59 kg/m².  Wt Readings from Last 6 Encounters:   04/28/25 132 lb (59.9 kg)   02/26/25 135 lb 6.4 oz (61.4 kg)   02/13/25 134 lb (60.8 kg)   02/03/25 134 lb 6.4 oz (61 kg)   01/27/25 137 lb (62.1 kg)   11/27/24 138 lb (62.6 kg)        DATA:     Pertinent data reviewed   CT ABD 2021 ADRENALS:   No defined mass or abnormal enlargement.     Lab Results   Component Value Date    A1C 11.2 (A)  01/27/2025    A1C 12.3 (H) 10/23/2024    A1C 7.6 (H) 05/31/2020       Latest Reference Range & Units 10/23/24 11:19   ALKALINE PHOSPHATASE 41 - 108 U/L 148 (H)   AST (SGOT) <34 U/L 29   ALT (SGPT) 10 - 49 U/L 35   Total Bilirubin 0.3 - 1.2 mg/dL 0.7   Globulin 2.0 - 3.5 g/dL 3.2   Cholesterol, Total <200 mg/dL 225 (H)   Triglycerides 30 - 149 mg/dL 172 (H)   HDL Cholesterol 40 - 59 mg/dL 55   VLDL 0 - 30 mg/dL 32 (H)   NON-HDL CHOLESTEROL <130 mg/dL 170 (H)   LDL Cholesterol Calc <100 mg/dL 139 (H)   (H): Data is abnormally high     10/23/24 11:19   TSH  2.182      Latest Reference Range & Units 10/23/24 11:19   CREATININE UR RANDOM mg/dL 61.10   MALB URINE mg/dL 1.30   MALB/CRE CALC <=30.0 ug/mg 21.3     Cholesterol: 225, done on 10/23/2024.  HDL Cholesterol: 55, done on 10/23/2024.  LDL Cholesterol: 139, done on 10/23/2024.  TriGlycerides 172, done on 10/23/2024.    CT CHEST+ABDOMEN+PELVIS(ALL CNTRST ONLY)(ENO=[id number])  Result Date: 1/31/2025  CONCLUSION:  1. No evidence of intrathoracic, intra-abdominal, or intrapelvic metastatic disease.  2. Extensive uncomplicated distal colonic diverticulosis.  3. Postoperative changes of right hemicolectomy with unremarkable appearance of the right upper quadrant ileocolic anastomosis.  4. Suspected hepatic steatosis.  5. Status post cholecystectomy with mild prominence of the extrahepatic biliary tree, within expectations for age-related ectasia and the postcholecystectomy state.  6. Lesser incidental findings as above.    Dictated by (CST): Samuel Caal MD on 1/31/2025 at 8:09 AM     Finalized by (CST): Samuel Caal MD on 1/31/2025 at 8:19 AM          Micro Albumen/Creatinine:    Lab Results   Component Value Date    MICROALBCREA  02/26/2025      Comment:      Unable to calculate due to Urine Microalbumin <0.3 mg/dL        MICROALBCREA 21.3 10/23/2024      Diabetes:    Lab Results   Component Value Date    A1C 11.2 (A) 01/27/2025    A1C 12.3 (H) 10/23/2024    A1C  7.6 (H) 2020     (H) 10/23/2024     (H) 2020     Lab Results   Component Value Date    CHOLEST 225 (H) 10/23/2024     Lab Results   Component Value Date    HDL 55 10/23/2024     Lab Results   Component Value Date     (H) 10/23/2024     Lab Results   Component Value Date    TRIG 172 (H) 10/23/2024     Lab Results   Component Value Date    AST 29 10/23/2024    AST 57 (H) 2021    AST 34 2021     Lab Results   Component Value Date    ALT 35 10/23/2024    ALT 72 (H) 2021    ALT 52 2021         No results for input(s): \"TSH\", \"T4F\", \"T3F\", \"THYP\" in the last 72 hours.         Orders Placed This Encounter   Procedures    POC HemoCue Glucose 201 (Finger stick glucose)    POC Glycohemoglobin [50143]     Orders Placed This Encounter    POC HemoCue Glucose 201 (Finger stick glucose)     Release to patient:   Immediate    POC Glycohemoglobin [[id number]]     Release to patient:   Immediate    metFORMIN  MG Oral Tablet 24 Hr     Sig: Take 2 tablets (1,000 mg total) by mouth 2 (two) times daily with meals.     Dispense:  360 tablet     Refill:  1    glipiZIDE 5 MG Oral Tab     Sig: Take 1 tablet (5 mg total) by mouth 2 (two) times daily before meals.     Dispense:  180 tablet     Refill:  0    Continuous Glucose Sensor (DEXCOM G7 SENSOR) Does not apply Misc     Si each Every 10 days. Use as directed every 10 days     Dispense:  9 each     Refill:  1    empagliflozin (JARDIANCE) 25 MG Oral Tab     Sig: Take 1 tablet (25 mg total) by mouth daily.     Dispense:  90 tablet     Refill:  1      TSH   Date Value Ref Range Status   10/23/2024 2.182 0.550 - 4.780 mIU/mL Final     @[facility]     No orders of the defined types were placed in this encounter.       This is a specialized patient consultation in endocrinology and required comprehensive review of prior records, as well as current evaluation, with time required for consideration of complex endocrine issues and  consultation. For this visit, I personally interviewed the patient, and family member if accompanied, performed the pertinent parts of the history and physical examination. ROS included screening for appropriate endocrine conditions.   Today's diagnosis and plan were reviewed in detail with the patient who states understanding and agrees with plan. I discussed with the patient possible diagnosis, differential diagnosis, need for work up, treatment options, alternatives and side effects.     Please see note for details about time spent which includes:   · pre-visit preparation  · reviewing records  · face to face time with the patient   · timely documentation of the encounter  · ordering medications/tests  · communication with care team  · care coordination    I appreciate the opportunity to be part of your patient's medical care and will keep you, as the referring and primary physicians, informed about the care of your patient. Please feel free to contact me should you have any questions.      The 21st Century Cures Act makes medical notes like these available to patients in the interest of transparency. Please be advised this is a medical document. Medical documents are intended to carry relevant information, facts as evident, and the clinical opinion of the practitioner. The medical note is intended as peer to peer communication and may appear blunt or direct. It is written in medical language and may contain abbreviations or verbiage that are unfamiliar.     Lise Thornton MD         [1]   Allergies  Allergen Reactions    Codeine HIVES and NAUSEA ONLY    Seasonal OTHER (SEE COMMENTS)     Runny nose, sneezing, itchy eyes

## 2025-05-02 ENCOUNTER — LAB ENCOUNTER (OUTPATIENT)
Dept: LAB | Facility: HOSPITAL | Age: 55
End: 2025-05-02
Attending: INTERNAL MEDICINE
Payer: COMMERCIAL

## 2025-05-02 ENCOUNTER — TELEPHONE (OUTPATIENT)
Age: 55
End: 2025-05-02

## 2025-05-02 DIAGNOSIS — Z85.038 ENCOUNTER FOR FOLLOW-UP SURVEILLANCE OF COLON CANCER: ICD-10-CM

## 2025-05-02 DIAGNOSIS — Z08 ENCOUNTER FOR FOLLOW-UP SURVEILLANCE OF COLON CANCER: ICD-10-CM

## 2025-05-02 LAB
ALBUMIN SERPL-MCNC: 4.1 G/DL (ref 3.2–4.8)
ALBUMIN/GLOB SERPL: 1.4 {RATIO} (ref 1–2)
ALP LIVER SERPL-CCNC: 111 U/L (ref 41–108)
ALT SERPL-CCNC: 32 U/L (ref 10–49)
ANION GAP SERPL CALC-SCNC: 7 MMOL/L (ref 0–18)
AST SERPL-CCNC: 35 U/L (ref ?–34)
BASOPHILS # BLD AUTO: 0.03 X10(3) UL (ref 0–0.2)
BASOPHILS NFR BLD AUTO: 0.5 %
BILIRUB SERPL-MCNC: 0.5 MG/DL (ref 0.3–1.2)
BUN BLD-MCNC: 13 MG/DL (ref 9–23)
BUN/CREAT SERPL: 14.9 (ref 10–20)
CALCIUM BLD-MCNC: 8.8 MG/DL (ref 8.7–10.4)
CEA SERPL-MCNC: 1.8 NG/ML (ref ?–5)
CHLORIDE SERPL-SCNC: 107 MMOL/L (ref 98–112)
CO2 SERPL-SCNC: 29 MMOL/L (ref 21–32)
CREAT BLD-MCNC: 0.87 MG/DL (ref 0.55–1.02)
DEPRECATED RDW RBC AUTO: 38.5 FL (ref 35.1–46.3)
EGFRCR SERPLBLD CKD-EPI 2021: 79 ML/MIN/1.73M2 (ref 60–?)
EOSINOPHIL # BLD AUTO: 0.11 X10(3) UL (ref 0–0.7)
EOSINOPHIL NFR BLD AUTO: 1.8 %
ERYTHROCYTE [DISTWIDTH] IN BLOOD BY AUTOMATED COUNT: 12.8 % (ref 11–15)
FASTING STATUS PATIENT QL REPORTED: NO
GLOBULIN PLAS-MCNC: 2.9 G/DL (ref 2–3.5)
GLUCOSE BLD-MCNC: 206 MG/DL (ref 70–99)
HCT VFR BLD AUTO: 38.8 % (ref 35–48)
HGB BLD-MCNC: 12.9 G/DL (ref 12–16)
IMM GRANULOCYTES # BLD AUTO: 0.02 X10(3) UL (ref 0–1)
IMM GRANULOCYTES NFR BLD: 0.3 %
LYMPHOCYTES # BLD AUTO: 2.34 X10(3) UL (ref 1–4)
LYMPHOCYTES NFR BLD AUTO: 38.4 %
MCH RBC QN AUTO: 27.6 PG (ref 26–34)
MCHC RBC AUTO-ENTMCNC: 33.2 G/DL (ref 31–37)
MCV RBC AUTO: 83.1 FL (ref 80–100)
MONOCYTES # BLD AUTO: 0.33 X10(3) UL (ref 0.1–1)
MONOCYTES NFR BLD AUTO: 5.4 %
NEUTROPHILS # BLD AUTO: 3.27 X10 (3) UL (ref 1.5–7.7)
NEUTROPHILS # BLD AUTO: 3.27 X10(3) UL (ref 1.5–7.7)
NEUTROPHILS NFR BLD AUTO: 53.6 %
OSMOLALITY SERPL CALC.SUM OF ELEC: 302 MOSM/KG (ref 275–295)
PLATELET # BLD AUTO: 300 10(3)UL (ref 150–450)
POTASSIUM SERPL-SCNC: 3.7 MMOL/L (ref 3.5–5.1)
PROT SERPL-MCNC: 7 G/DL (ref 5.7–8.2)
RBC # BLD AUTO: 4.67 X10(6)UL (ref 3.8–5.3)
SODIUM SERPL-SCNC: 143 MMOL/L (ref 136–145)
WBC # BLD AUTO: 6.1 X10(3) UL (ref 4–11)

## 2025-05-02 PROCEDURE — 82378 CARCINOEMBRYONIC ANTIGEN: CPT

## 2025-05-02 PROCEDURE — 36415 COLL VENOUS BLD VENIPUNCTURE: CPT

## 2025-05-02 PROCEDURE — 80053 COMPREHEN METABOLIC PANEL: CPT

## 2025-05-02 PROCEDURE — 85025 COMPLETE CBC W/AUTO DIFF WBC: CPT

## 2025-05-02 NOTE — TELEPHONE ENCOUNTER
Called and spoke with Andria. Reminded her to complete her labs before her appointment with Dr. Tyson on Monday, 5/05 at 10:20 am. Asked her if she's able to go to the lab today or over the weekend. She asked which lab to go to. I told her she can go to the [facility] lab located here at the [facility] at [address], yellow parking lot. Told her she does not have to fast for the labs. She verbalized understanding and thanked me for the reminder.

## 2025-05-05 ENCOUNTER — OFFICE VISIT (OUTPATIENT)
Age: 55
End: 2025-05-05
Attending: INTERNAL MEDICINE
Payer: COMMERCIAL

## 2025-05-05 VITALS
OXYGEN SATURATION: 98 % | WEIGHT: 134.19 LBS | DIASTOLIC BLOOD PRESSURE: 67 MMHG | SYSTOLIC BLOOD PRESSURE: 117 MMHG | BODY MASS INDEX: 30.19 KG/M2 | HEART RATE: 79 BPM | RESPIRATION RATE: 16 BRPM | TEMPERATURE: 97 F | HEIGHT: 56 IN

## 2025-05-05 DIAGNOSIS — Z85.038 ENCOUNTER FOR FOLLOW-UP SURVEILLANCE OF COLON CANCER: Primary | ICD-10-CM

## 2025-05-05 DIAGNOSIS — K76.0 HEPATIC STEATOSIS: ICD-10-CM

## 2025-05-05 DIAGNOSIS — Z08 ENCOUNTER FOR FOLLOW-UP SURVEILLANCE OF COLON CANCER: Primary | ICD-10-CM

## 2025-05-05 DIAGNOSIS — R74.01 ELEVATED AST (SGOT): ICD-10-CM

## 2025-05-05 NOTE — PROGRESS NOTES
Cancer Center Consultation Note    Patient Name: Andria Payton   YOB: 1970   Medical Record Number: [id number]     Chief Complaint:  History of Colon cancer    Oncology History:  Andria Payton is a 49 year old female with high risk stage II (pT4aN0) adenocarcinoma of the right colon Status post resection 6/5/2020 ( Dr Frazier), MSI-H s/p adjuvant FOLFOX July 2020-9/23/20 given by Dr. Theodore.    She has had issues with chemotherapy-induced nausea vomiting as well as anemia on treatment. Hospitalization for Chemo-induced N/V after C1 and C3. Course was complicated by grade 2 neuropathy as well.    Interval History:  Patient returns to oncology for surveillance of colon cancer. She was previous eval by Dr. Theodore in 6/2021. Patient reports feeling well. She denies any systemic signs of illness. No reports of blood loss from any orifice. No new concerns on ROS.      Past Medical History:  Past Medical History:    Colon cancer (HCC)    Diabetes (HCC)    High blood pressure    High cholesterol    Hx of motion sickness    Visual impairment       Past Surgical History:  Past Surgical History:   Procedure Laterality Date    Cholecystectomy      Colonoscopy N/A 06/01/2020    Procedure: COLONOSCOPY;  Surgeon: Shad Vital MD;  Location: [facility] ENDOSCOPY    Colonoscopy N/A 11/08/2021    Procedure: COLONOSCOPY;  Surgeon: Shad Vital MD;  Location: [facility] ENDOSCOPY    Colonoscopy      Colonoscopy  02/18/2025    Dr. Vital; diverticulosis    Colonoscopy N/A 2/18/2025    Procedure: COLONOSCOPY;  Surgeon: Shad Vital MD;  Location: [facility] ENDOSCOPY       Family History:  Family History   Problem Relation Age of Onset    Colon Cancer Self 51    Hypertension Mother     Thyroid Disorder Mother     Diabetes Father     Cancer Maternal Grandmother 87        stomach ca, non-smoker    Colon Cancer Maternal Aunt 72    Cancer Niece 2        leukemia    Genetic Disease Niece         hemifacial microsomia     Genetic Disease Niece         hemifacial microsomia    Cancer Maternal Cousin Female          unknown primary (dtr of aunt w/colon ca)    Cancer Maternal Cousin Female 59        stomach ca (dtr of aunt w/colon ca)       Social History:  Unchanged from consult note    Current Medications:    Current Outpatient Medications:     metFORMIN  MG Oral Tablet 24 Hr, Take 2 tablets (1,000 mg total) by mouth 2 (two) times daily with meals., Disp: 360 tablet, Rfl: 1    glipiZIDE 5 MG Oral Tab, Take 1 tablet (5 mg total) by mouth 2 (two) times daily before meals., Disp: 180 tablet, Rfl: 0    Continuous Glucose Sensor (DEXCOM G7 SENSOR) Does not apply Misc, 1 each Every 10 days. Use as directed every 10 days, Disp: 9 each, Rfl: 1    empagliflozin (JARDIANCE) 25 MG Oral Tab, Take 1 tablet (25 mg total) by mouth daily., Disp: 90 tablet, Rfl: 1    atorvastatin 20 MG Oral Tab, Take 1 tablet (20 mg total) by mouth nightly., Disp: 90 tablet, Rfl: 0    insulin degludec (TRESIBA FLEXTOUCH) 100 UNIT/ML Subcutaneous Solution Pen-injector, Inject 20 Units into the skin nightly., Disp: 20 mL, Rfl: 0    metoprolol tartrate 25 MG Oral Tab, Take 1 tablet (25 mg total) by mouth 2x Daily(Beta Blocker)., Disp: 180 tablet, Rfl: 1    cetirizine 10 MG Oral Tab, Take 1 tablet (10 mg total) by mouth as needed for Allergies., Disp: , Rfl:     Allergies:  Allergies   Allergen Reactions    Codeine HIVES and NAUSEA ONLY    Seasonal OTHER (SEE COMMENTS)     Runny nose, sneezing, itchy eyes        Review of Systems:  All other systems reviewed and negative x10 except as listed above    Vital Signs:  /67 (BP Location: Right arm, Patient Position: Sitting, Cuff Size: adult)   Pulse 79   Temp 97.2 °F (36.2 °C) (Tympanic)   Resp 16   Ht 1.422 m (4' 8\")   Wt 60.9 kg (134 lb 3.2 oz)   SpO2 98%   BMI 30.09 kg/m²     Physical Examination:  Performance Status:  General: Patient is alert and oriented x 3, not in acute distress.  HEENT: EOMs intact.  MMM  Chest:  nonlabored breathing, CTA  Abdomen: Soft, non tender to palpitation without any rebound, no masses  Extremities: No edema, cyanosis, or bruising   Neurological: motor strength grossly intact, MA4E  Psych: appropriate mood and affect        Laboratory assessed and reviewed:  Lab Results   Component Value Date    WBC 6.1 05/02/2025    RBC 4.67 05/02/2025    HGB 12.9 05/02/2025    HCT 38.8 05/02/2025    MCV 83.1 05/02/2025    MCH 27.6 05/02/2025    MCHC 33.2 05/02/2025    RDW 12.8 05/02/2025    .0 05/02/2025       Lab Results   Component Value Date     (H) 05/02/2025    BUN 13 05/02/2025    BUNCREA 14.9 05/02/2025    CREATSERUM 0.87 05/02/2025    ANIONGAP 7 05/02/2025    GFRNAA 104 06/17/2021    GFRAA 119 06/17/2021    CA 8.8 05/02/2025    OSMOCALC 302 (H) 05/02/2025    ALKPHO 111 (H) 05/02/2025    AST 35 (H) 05/02/2025    ALT 32 05/02/2025    BILT 0.5 05/02/2025    TP 7.0 05/02/2025    ALB 4.1 05/02/2025    GLOBULIN 2.9 05/02/2025     05/02/2025    K 3.7 05/02/2025     05/02/2025    CO2 29.0 05/02/2025     Imaging:    CT C/A/P 1/31/25  Impression   CONCLUSION:  1. No evidence of intrathoracic, intra-abdominal, or intrapelvic metastatic disease.     2. Extensive uncomplicated distal colonic diverticulosis.     3. Postoperative changes of right hemicolectomy with unremarkable appearance of the right upper quadrant ileocolic anastomosis.     4. Suspected hepatic steatosis.     5. Status post cholecystectomy with mild prominence of the extrahepatic biliary tree, within expectations for age-related ectasia and the postcholecystectomy state.     6. Lesser incidental findings as above.       Impression and Plan:    1.) Hx of high risk stage II (pT4aN0) adenocarcinoma of the right colon   - Status post resection 6/5/2020.  MSI-H  - s/p adjuvant FOLFOX with Dr. Theodore; tolerability issues and hospitalization  - CEA suppressed  - annual CT scan in 1/25 is negative for signs of  recurrence  --CLN due in 2/2025 with GI is negative for recurrence; repeat C-scope rec for in 3 yrs (2/2028)  - return in 6 mo for H&P and labs    2.)chemo-induced peripheral neuropathy  --stable; grade 1, no longer using cymbalta     3.) Family hx of cancer  -- MMR loss  --Family history of GI malignancies. Pt met with genetics in 7/2020: Negative Ambry 17 gene test neg except for MSH6 somatic mutation limited to tumor     4.) hepatic steatosis with an inc AST  --rec diet and exercise to improve this condition; may be causing the inc of the AST level    MDM: Moderate Risk    Casa Tyson MD  [facility]  [facility]  [facility]. [facility] [place], [place] [address]

## 2025-05-28 ENCOUNTER — OFFICE VISIT (OUTPATIENT)
Dept: INTERNAL MEDICINE CLINIC | Facility: CLINIC | Age: 55
End: 2025-05-28

## 2025-05-28 VITALS
WEIGHT: 131.38 LBS | BODY MASS INDEX: 27.58 KG/M2 | HEART RATE: 74 BPM | HEIGHT: 58 IN | TEMPERATURE: 98 F | OXYGEN SATURATION: 96 % | SYSTOLIC BLOOD PRESSURE: 107 MMHG | DIASTOLIC BLOOD PRESSURE: 68 MMHG | RESPIRATION RATE: 18 BRPM

## 2025-05-28 DIAGNOSIS — E55.9 VITAMIN D DEFICIENCY: ICD-10-CM

## 2025-05-28 DIAGNOSIS — Z00.00 ANNUAL PHYSICAL EXAM: Primary | ICD-10-CM

## 2025-05-28 PROCEDURE — 99396 PREV VISIT EST AGE 40-64: CPT | Performed by: INTERNAL MEDICINE

## 2025-05-28 NOTE — PROGRESS NOTES
HPI:   Andria Payton is a 54 year old female who presents for a complete physical exam.   She denies any complains. He blood sugar is much better   Wt Readings from Last 3 Encounters:   05/28/25 131 lb 6.4 oz (59.6 kg)   05/05/25 134 lb 3.2 oz (60.9 kg)   04/28/25 132 lb (59.9 kg)     Body mass index is 27.46 kg/m².     Cholesterol, Total (mg/dL)   Date Value   10/23/2024 225 (H)     HDL Cholesterol (mg/dL)   Date Value   10/23/2024 55     LDL Cholesterol (mg/dL)   Date Value   10/23/2024 139 (H)     AST (U/L)   Date Value   05/02/2025 35 (H)   10/23/2024 29   06/17/2021 57 (H)     ALT (U/L)   Date Value   05/02/2025 32   10/23/2024 35   06/17/2021 72 (H)        Current Medications[1]   Past Medical History[2]   Past Surgical History[3]   Family History[4]   Social History:   Short Social Hx on File[5]  Exercise: minimal.  Diet: watches calories closely     REVIEW OF SYSTEMS:     Constitutional Negative Chills, fatigue and fever.   ENMT Negative Hearing loss, nasal drainage, pain in/around ear, sinus pressure and sore throat.   Eyes Negative Eye pain and vision changes.   Respiratory Negative Cough, dyspnea and wheezing.   Cardio Negative Chest pain, claudication, edema and irregular heartbeat/palpitations.   GI Negative Abdominal pain, blood in stool, constipation, diarrhea, heartburn, nausea and vomiting.    Negative Dysuria, hematuria, urinary incontinence. Menses regular, not heavy.   Endocrine Negative Cold intolerance and heat intolerance.   Neuro Negative Dizziness, extremity weakness, headache, memory impairment, numbness in extremities, seizures and tremors.   Psych Negative Anxiety, depression and insomnia.   Integumentary Negative Breast discharge, breast lump(s), hair loss and rash.   MS Negative Back pain and joint pain.   Hema/Lymph Negative Easy bleeding, easy bruising and thromboembolic events.   Allergic/Immuno Negative Environmental allergies, food allergies and seasonal allergies.         EXAM:    /68 (BP Location: Left arm, Patient Position: Sitting, Cuff Size: adult)   Pulse 74   Temp 97.7 °F (36.5 °C) (Tympanic)   Resp 18   Ht 4' 10\" (1.473 m)   Wt 131 lb 6.4 oz (59.6 kg)   SpO2 96%   BMI 27.46 kg/m²   Body mass index is 27.46 kg/m².     Constitutional Normal Well developed.   Eyes Normal Pupil - Right: Normal, Left: Normal.   Ears Normal External - normal. Canal. TM - Normal bilaterally  Hearing - grossly normal   Nasopharynx Normal External nose - Normal. Lips/teeth/gums - Normal. Oropharynx - clear no erythema or exudate   Neck Exam Normal Palpation - Normal. No thyromegaly or lymphadenopathy   Breast Normal Inspection, palpation, nipples normal bilaterally. Self breast exam has been taught. Patient performs self breast exam.   Respiratory Normal Auscultation - Normal, no wheezes or rales   Cardiovascular Normal Regular rate and rhythm. No murmurs, gallops, or rubs   Vascular Normal Pulses - Dorsalis pedis: Normal. Bruits - Carotids: Absent.    Extremity Normal No edema. No varicosities.   Abdomen Normal Inspection normal, BS active. No abdominal tenderness or masses palpable   Musculoskeletal Normal Overview - Normal. No swelling or deformities.   Skin Normal Inspection - Normal.   Neurological Normal Memory - Normal. Sensory - Normal. Motor - Normal. Balance & gait - Normal.   Psychiatric Normal Orientation - Oriented to time, place, person & situation. Appropriate mood and affect.          ASSESSMENT AND PLAN:   Andria Payton is a 54 year old female who presents for a complete physical exam.  Self breast exam explained.   Health maintenance: Patient is due for blood work  Type 2 diabetes very well-controlled continue with current medication continue to watch diet avoid carbs  Hypercholesterolemia will check lipid panel continue with statin  Pt' s weight is Body mass index is 27.46 kg/m²., recommended low fat diet and aerobic exercise 30 minutes three times weekly.  The patient  indicates understanding of these issues and agrees to the plan.  The patient is asked to return for annual physical in 1 year.           [1]   Current Outpatient Medications   Medication Sig Dispense Refill    metFORMIN  MG Oral Tablet 24 Hr Take 2 tablets (1,000 mg total) by mouth 2 (two) times daily with meals. 360 tablet 1    glipiZIDE 5 MG Oral Tab Take 1 tablet (5 mg total) by mouth 2 (two) times daily before meals. 180 tablet 0    Continuous Glucose Sensor (DEXCOM G7 SENSOR) Does not apply Misc 1 each Every 10 days. Use as directed every 10 days 9 each 1    empagliflozin (JARDIANCE) 25 MG Oral Tab Take 1 tablet (25 mg total) by mouth daily. 90 tablet 1    atorvastatin 20 MG Oral Tab Take 1 tablet (20 mg total) by mouth nightly. 90 tablet 0    insulin degludec (TRESIBA FLEXTOUCH) 100 UNIT/ML Subcutaneous Solution Pen-injector Inject 20 Units into the skin nightly. 20 mL 0    metoprolol tartrate 25 MG Oral Tab Take 1 tablet (25 mg total) by mouth 2x Daily(Beta Blocker). 180 tablet 1    cetirizine 10 MG Oral Tab Take 1 tablet (10 mg total) by mouth as needed for Allergies.     [2]   Past Medical History:   Colon cancer (HCC)    Diabetes (HCC)    High blood pressure    High cholesterol    Hx of motion sickness    Visual impairment   [3]   Past Surgical History:  Procedure Laterality Date    Cholecystectomy      Colonoscopy N/A 06/01/2020    Procedure: COLONOSCOPY;  Surgeon: Shad Vital MD;  Location: [facility] ENDOSCOPY    Colonoscopy N/A 11/08/2021    Procedure: COLONOSCOPY;  Surgeon: Shad Vital MD;  Location: [facility] ENDOSCOPY    Colonoscopy      Colonoscopy  02/18/2025    Dr. Vital; diverticulosis    Colonoscopy N/A 2/18/2025    Procedure: COLONOSCOPY;  Surgeon: Shad Vital MD;  Location: [facility] ENDOSCOPY   [4]   Family History  Problem Relation Age of Onset    Colon Cancer Self 51    Hypertension Mother     Thyroid Disorder Mother     Diabetes Father     Cancer Maternal  Grandmother 87        stomach ca, non-smoker    Colon Cancer Maternal Aunt 72    Cancer Niece 2        leukemia    Genetic Disease Niece         hemifacial microsomia    Genetic Disease Niece         hemifacial microsomia    Cancer Maternal Cousin Female          unknown primary (dtr of aunt w/colon ca)    Cancer Maternal Cousin Female 59        stomach ca (dtr of aunt w/colon ca)   [5]   Social History  Socioeconomic History    Marital status:    Tobacco Use    Smoking status: Never     Passive exposure: Never    Smokeless tobacco: Never   Vaping Use    Vaping status: Never Used   Substance and Sexual Activity    Alcohol use: Yes     Alcohol/week: 1.0 standard drink of alcohol     Types: 1 Cans of beer per week     Comment: rare    Drug use: Never    Sexual activity: Yes     Partners: Male   Social History Narrative    Andria is . She has 2 adult children. 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 She lives in [place], [place].     Social Drivers of Health     Food Insecurity: No Food Insecurity (11/27/2024)    NCSS - Food Insecurity     Worried About Running Out of Food in the Last Year: No     Ran Out of Food in the Last Year: No   Transportation Needs: No Transportation Needs (11/27/2024)    NCSS - Transportation     Lack of Transportation: No   Housing Stability: Not At Risk (11/27/2024)    NCSS - Housing/Utilities     Has Housing: Yes     Worried About Losing Housing: No     Unable to Get Utilities: No

## 2025-07-09 RX ORDER — METOPROLOL TARTRATE 25 MG/1
25 TABLET, FILM COATED ORAL 2 TIMES DAILY
Qty: 180 TABLET | Refills: 3 | Status: SHIPPED | OUTPATIENT
Start: 2025-07-09

## 2025-07-09 NOTE — TELEPHONE ENCOUNTER
Refill passed per Clinic protocol.  Requested Prescriptions   Pending Prescriptions Disp Refills    metoprolol tartrate 25 MG Oral Tab [Pharmacy Med Name: METOPROLOL TARTRATE 25 MG TAB] 180 tablet 1     Sig: Take 1 tablet (25 mg total) by mouth 2x Daily(Beta Blocker).       Hypertension Medications Protocol Passed - 7/9/2025 11:03 AM        Passed - CMP or BMP in past 12 months        Passed - Last BP reading less than 140/90     BP Readings from Last 1 Encounters:   05/28/25 107/68               Passed - In person appointment or virtual visit in the past 12 mos or appointment in next 3 mos     Recent Outpatient Visits              1 month ago Annual physical exam    [facility] Carmelita Conroy MD    Office Visit    2 months ago Encounter for follow-up surveillance of colon cancer    [facility] [place] Casa Tyson MD    Office Visit    2 months ago Malignant neoplasm of ascending colon (HCC)    [facility] Lise Thornton MD    Office Visit    4 months ago Type 2 diabetes mellitus without complication, with long-term current use of insulin ([facility])    [facility] Carmelita Conroy MD    Office Visit    5 months ago Uncontrolled type 2 diabetes mellitus with hyperglycemia ([facility])    [facility] Lise Thornton MD    Office Visit          Future Appointments         Provider Department Appt Notes    In 3 weeks Lise Thornton MD [facility] 3 months    In 2 months Carmelita Conroy MD [facility] 4 month fu    In 4 months Casa Tyson MD [facility] [place] Follow-up 6 months-LS                    Passed - EGFRCR or GFRNAA > 50     GFR  Evaluation  EGFRCR: 79 , resulted on 5/2/2025          Passed - Medication is active on med list

## 2025-07-16 ENCOUNTER — TELEPHONE (OUTPATIENT)
Dept: ENDOCRINOLOGY | Facility: HOSPITAL | Age: 55
End: 2025-07-16

## 2025-07-16 NOTE — TELEPHONE ENCOUNTER
Called patient to follow up on DSMT Initial missed classes. LVM to see if patient would like to schedule appointment. Provided office phone number and awaiting patient to call back.

## 2025-07-30 DIAGNOSIS — E11.9 TYPE 2 DIABETES MELLITUS WITHOUT COMPLICATION, WITHOUT LONG-TERM CURRENT USE OF INSULIN (HCC): ICD-10-CM

## 2025-07-30 DIAGNOSIS — C18.2 MALIGNANT NEOPLASM OF ASCENDING COLON (HCC): ICD-10-CM

## 2025-07-30 DIAGNOSIS — E78.2 MIXED HYPERLIPIDEMIA: ICD-10-CM

## 2025-07-30 DIAGNOSIS — R73.09 HIGH GLUCOSE LEVEL: ICD-10-CM

## 2025-07-30 DIAGNOSIS — T45.1X5A CHEMOTHERAPY-INDUCED NEUROPATHY (HCC): ICD-10-CM

## 2025-07-30 DIAGNOSIS — G62.0 CHEMOTHERAPY-INDUCED NEUROPATHY (HCC): ICD-10-CM

## 2025-07-30 RX ORDER — GLIPIZIDE 5 MG/1
5 TABLET ORAL
Qty: 180 TABLET | Refills: 1 | Status: SHIPPED | OUTPATIENT
Start: 2025-07-30

## 2025-08-04 ENCOUNTER — OFFICE VISIT (OUTPATIENT)
Dept: ENDOCRINOLOGY CLINIC | Facility: CLINIC | Age: 55
End: 2025-08-04

## 2025-08-04 VITALS
WEIGHT: 130 LBS | BODY MASS INDEX: 27.29 KG/M2 | DIASTOLIC BLOOD PRESSURE: 75 MMHG | HEART RATE: 71 BPM | SYSTOLIC BLOOD PRESSURE: 121 MMHG | HEIGHT: 58 IN

## 2025-08-04 DIAGNOSIS — R73.09 HIGH GLUCOSE LEVEL: ICD-10-CM

## 2025-08-04 DIAGNOSIS — T45.1X5A CHEMOTHERAPY-INDUCED NEUROPATHY (HCC): ICD-10-CM

## 2025-08-04 DIAGNOSIS — G62.0 CHEMOTHERAPY-INDUCED NEUROPATHY (HCC): ICD-10-CM

## 2025-08-04 DIAGNOSIS — E11.9 TYPE 2 DIABETES MELLITUS WITHOUT COMPLICATION, WITHOUT LONG-TERM CURRENT USE OF INSULIN (HCC): ICD-10-CM

## 2025-08-04 DIAGNOSIS — C18.2 MALIGNANT NEOPLASM OF ASCENDING COLON (HCC): ICD-10-CM

## 2025-08-04 DIAGNOSIS — I15.2 HYPERTENSION ASSOCIATED WITH TYPE 2 DIABETES MELLITUS (HCC): ICD-10-CM

## 2025-08-04 DIAGNOSIS — E11.65 UNCONTROLLED TYPE 2 DIABETES MELLITUS WITH HYPERGLYCEMIA (HCC): Primary | ICD-10-CM

## 2025-08-04 DIAGNOSIS — E11.59 HYPERTENSION ASSOCIATED WITH TYPE 2 DIABETES MELLITUS (HCC): ICD-10-CM

## 2025-08-04 DIAGNOSIS — E78.2 MIXED HYPERLIPIDEMIA: ICD-10-CM

## 2025-08-04 LAB
GLUCOSE BLOOD: 142
HEMOGLOBIN A1C: 8 % (ref 4.3–5.6)
TEST STRIP LOT #: NORMAL NUMERIC

## 2025-08-04 RX ORDER — TIRZEPATIDE 2.5 MG/.5ML
2.5 INJECTION, SOLUTION SUBCUTANEOUS
Qty: 2 ML | Refills: 3 | Status: SHIPPED | OUTPATIENT
Start: 2025-08-04

## (undated) DEVICE — SNARE OPTMZ PLPCTM TRP

## (undated) DEVICE — PERMANENT CAUTERY HOOK: Brand: ENDOWRIST

## (undated) DEVICE — CLIP HEMOLOK LARGE PURPLE

## (undated) DEVICE — KIT ENDO ORCAPOD 160/180/190

## (undated) DEVICE — FENESTRATED BIPOLAR FORCEPS: Brand: ENDOWRIST

## (undated) DEVICE — LINE MNTR ADLT SET O2 INTMD

## (undated) DEVICE — COLUMN DRAPE

## (undated) DEVICE — Device: Brand: CUSTOM PROCEDURE KIT

## (undated) DEVICE — DERMABOND LIQUID ADHESIVE

## (undated) DEVICE — WOUND RETRACTOR AND PROTECTOR: Brand: ALEXIS O WOUND PROTECTOR-RETRACTOR

## (undated) DEVICE — CURVED-TIP STAPLER 30: Brand: ENDOWRIST

## (undated) DEVICE — SUTURE SILK 3-0 SH

## (undated) DEVICE — AIRSEAL 5 MM ACCESS PORT AND LOW PROFILE OBTURATOR WITH BLADELESS OPTICAL TIP, 120 MM LENGTH: Brand: AIRSEAL

## (undated) DEVICE — SUTURE PDS II 0 CTX

## (undated) DEVICE — FORCEP RADIAL JAW 4

## (undated) DEVICE — STAPLER 30: Brand: ENDOWRIST

## (undated) DEVICE — SUTURE PROLENE 3-0 SH

## (undated) DEVICE — CANNULA SEAL

## (undated) DEVICE — NEEDLE CONTRAST INTERJECT 25G

## (undated) DEVICE — GAMMEX® PI HYBRID SIZE 7, STERILE POWDER-FREE SURGICAL GLOVE, POLYISOPRENE AND NEOPRENE BLEND: Brand: GAMMEX

## (undated) DEVICE — AIRSEAL 8 MM ACCESS PORT AND LOW PROFILE OBTURATOR WITH BLADELESS OPTICAL TIP, 120 MM LENGTH: Brand: AIRSEAL

## (undated) DEVICE — REDUCER: Brand: ENDOWRIST

## (undated) DEVICE — DISPOSABLE GRASPER: Brand: EPIX LAPAROSCOPIC GRASPER

## (undated) DEVICE — SUTURE SILK 2-0 SH

## (undated) DEVICE — Device

## (undated) DEVICE — 35 ML SYRINGE REGULAR TIP: Brand: MONOJECT

## (undated) DEVICE — UNDYED BRAIDED (POLYGLACTIN 910), SYNTHETIC ABSORBABLE SUTURE: Brand: COATED VICRYL

## (undated) DEVICE — V2 SPECIMEN COLLECTION MANIFOLD KIT: Brand: NEPTUNE

## (undated) DEVICE — SUTURE ETHILON 3-0 PS-2

## (undated) DEVICE — 3 ML SYRINGE LUER-LOCK TIP: Brand: MONOJECT

## (undated) DEVICE — TIP COVER ACCESSORY

## (undated) DEVICE — SUTURE SILK 2-0 SA85H

## (undated) DEVICE — KIT CLEAN ENDOKIT 1.1OZ GOWNX2

## (undated) DEVICE — Device: Brand: DEFENDO AIR/WATER/SUCTION AND BIOPSY VALVE

## (undated) DEVICE — STAPLER SHEATH: Brand: ENDOWRIST

## (undated) DEVICE — SOL  .9 1000ML BTL

## (undated) DEVICE — SUTURE ETHILON 3-0 669H

## (undated) DEVICE — MEDI-VAC NON-CONDUCTIVE SUCTION TUBING 6MM X 1.8M (6FT.) L: Brand: CARDINAL HEALTH

## (undated) DEVICE — 40580 - THE PINK PAD - ADVANCED TRENDELENBURG POSITIONING KIT: Brand: 40580 - THE PINK PAD - ADVANCED TRENDELENBURG POSITIONING KIT

## (undated) DEVICE — SUTURE VLOC 180 ABS 3-0 GREEN

## (undated) DEVICE — 6 ML SYRINGE LUER-LOCK TIP: Brand: MONOJECT

## (undated) DEVICE — TROCAR: Brand: KII FIOS FIRST ENTRY

## (undated) DEVICE — SOL  .9 1000ML BAG

## (undated) DEVICE — DRAPE SHEET LAPCHOLE 124X100X7

## (undated) DEVICE — STAPLER 60 RELOAD BLUE: Brand: SUREFORM

## (undated) DEVICE — GAMMEX® PI HYBRID SIZE 7.5, STERILE POWDER-FREE SURGICAL GLOVE, POLYISOPRENE AND NEOPRENE BLEND: Brand: GAMMEX

## (undated) DEVICE — ARM DRAPE

## (undated) DEVICE — VIOLET BRAIDED (POLYGLACTIN 910), SYNTHETIC ABSORBABLE SUTURE: Brand: COATED VICRYL

## (undated) DEVICE — SUTURE VICRYL 0 J906G

## (undated) DEVICE — SNARE CAPTIFLEX MICRO-OVL OLY

## (undated) DEVICE — STAPLER 45 RELOAD WHITE: Brand: ENDOWRIST

## (undated) DEVICE — VESSEL SEALER EXTEND: Brand: ENDOWRIST

## (undated) DEVICE — LUBRICANT JLY SURGILUBE 2OZ

## (undated) DEVICE — 60 ML SYRINGE REGULAR TIP: Brand: MONOJECT

## (undated) DEVICE — TRI-LUMEN FILTERED TUBE SET WITH ACTIVATED CHARCOAL FILTER: Brand: AIRSEAL

## (undated) DEVICE — SUTURE PASSOR WITH GUIDE

## (undated) DEVICE — TOWEL OR BLU 16X26 STRL

## (undated) DEVICE — ROBOTIC: Brand: MEDLINE INDUSTRIES, INC.

## (undated) DEVICE — ELECTRO LUBE IS A SINGLE PATIENT USE DEVICE THAT IS INTENDED TO BE USED ON ELECTROSURGICAL ELECTRODES TO REDUCE STICKING.: Brand: KEY SURGICAL ELECTRO LUBE

## (undated) DEVICE — STAPLER 60: Brand: SUREFORM

## (undated) DEVICE — 3M™ IOBAN™ 2 ANTIMICROBIAL INCISE DRAPE 6650EZ: Brand: IOBAN™ 2

## (undated) DEVICE — PROVIDES A STERILE INTERFACE BETWEEN THE OPERATING ROOM SURGICAL LAMPS (NON-STERILE) AND THE SURGEON OR NURSE (STERILE).: Brand: STERION®CLAMP COVER FABRIC

## (undated) DEVICE — PROXIMATE SKIN STAPLERS (35 WIDE) CONTAINS 35 STAINLESS STEEL STAPLES (FIXED HEAD): Brand: PROXIMATE

## (undated) NOTE — LETTER
[facility]  Administration of Anesthesia  IAndria agree to be cared for by a physician anesthesiologist alone and/or with a nurse anesthetist, who is specially trained to monitor me and give me medicine to put me to sleep or keep me comfortable during my procedure    I understand that my anesthesiologist and/or anesthetist is not an employee or agent of [facility] or [employer]. He or she works for [facility]    As the patient asking for anesthesia services, I agree to:  Allow the anesthesiologist (anesthesia doctor) to give me medicine and do additional procedures as necessary. Some examples are: Starting or using an “IV” to give me medicine, fluids or blood during my procedure, and having a breathing tube placed to help me breathe when I’m asleep (intubation). In the event that my heart stops working properly, I understand that my anesthesiologist will make every effort to sustain my life, unless otherwise directed by [facility] Do Not Resuscitate documents.  Tell my anesthesia doctor before my procedure:  If I am pregnant.  The last time that I ate or drank.  iii. All of the medicines I take (including prescriptions, herbal supplements, and pills I can buy without a prescription (including street drugs/illegal medications). Failure to inform my anesthesiologist about these medicines may increase my risk of anesthetic complications.  iv.If I am allergic to anything or have had a reaction to anesthesia before.  I understand how the anesthesia medicine will help me (benefits).  I understand that with any type of anesthesia medicine there are risks:  The most common risks are: nausea, vomiting, sore throat, muscle soreness, damage to my eyes, mouth, or teeth (from breathing tube placement).  Rare risks include: remembering what happened during my procedure, allergic reactions to medications, injury to my airway, heart, lungs, vision, nerves, or  muscles and in extremely rare instances death.  My doctor has explained to me other choices available to me for my care (alternatives).  Pregnant Patients (“epidural”):  I understand that the risks of having an epidural (medicine given into my back to help control pain during labor), include itching, low blood pressure, difficulty urinating, headache or slowing of the baby’s heart. Very rare risks include infection, bleeding, seizure, irregular heart rhythms and nerve injury.  Regional Anesthesia (“spinal”, “epidural”, & “nerve blocks”):  I understand that rare but potential complications include headache, bleeding, infection, seizure, irregular heart rhythms, and nerve injury.    _____________________________________________________________________________  Patient (or Representative) Signature/Relationship to Patient  Date   Time    _____________________________________________________________________________   Name (if used)    Language/Organization   Time    _____________________________________________________________________________  Nurse Anesthetist Signature     Date   Time  _____________________________________________________________________________  Anesthesiologist Signature     Date   Time  I have discussed the procedure and information above with the patient (or patient’s representative) and answered their questions. The patient or their representative has agreed to have anesthesia services.    _____________________________________________________________________________  Witness        Date   Time  I have verified that the signature is that of the patient or patient’s representative, and that it was signed before the procedure  Patient Name: Andria Payton     : 1970                 Printed: 2025 at 7:40 AM    Medical Record #: [id number]                                            Page 1 of 1  ----------ANESTHESIA CONSENT----------

## (undated) NOTE — LETTER
[place] [address], [place], [place]    Authorization for Surgical Operation and Procedure                               I hereby authorize Shad Vital MD, my physician and his/her assistants (if applicable), which may include medical students, residents, and/or fellows, to perform the following surgical operation/ procedure and administer such anesthesia as may be determined necessary by my physician: Operation/Procedure name (s) COLONOSCOPY on Andria Payton   2.   I recognize that during the surgical operation/procedure, unforeseen conditions may necessitate additional or different procedures than those listed above.  I, therefore, further authorize and request that the above-named surgeon, assistants, or designees perform such procedures as are, in their judgment, necessary and desirable.    3.   My surgeon/physician has discussed prior to my surgery the potential benefits, risks and side effects of this procedure; the likelihood of achieving goals; and potential problems that might occur during recuperation.  They also discussed reasonable alternatives to the procedure, including risks, benefits, and side effects related to the alternatives and risks related to not receiving this procedure.  I have had all my questions answered and I acknowledge that no guarantee has been made as to the result that may be obtained.    4.   Should the need arise during my operation/procedure, which includes change of level of care prior to discharge, I also consent to the administration of blood and/or blood products.  Further, I understand that despite careful testing and screening of blood or blood products by collecting agencies, I may still be subject to ill effects as a result of receiving a blood transfusion and/or blood products.  The following are some, but not all, of the potential risks that can occur: fever and allergic reactions, hemolytic reactions, transmission of diseases such  as Hepatitis, AIDS and Cytomegalovirus (CMV) and fluid overload.  In the event that I wish to have an autologous transfusion of my own blood, or a directed donor transfusion, I will discuss this with my physician.  Check only if Refusing Blood or Blood Products  I understand refusal of blood or blood products as deemed necessary by my physician may have serious consequences to my condition to include possible death. I hereby assume responsibility for my refusal and release the hospital, its personnel, and my physicians from any responsibility for the consequences of my refusal.    o  Refuse   5.   I authorize the use of any specimen, organs, tissues, body parts or foreign objects that may be removed from my body during the operation/procedure for diagnosis, research or teaching purposes and their subsequent disposal by hospital authorities.  I also authorize the release of specimen test results and/or written reports to my treating physician on the hospital medical staff or other referring or consulting physicians involved in my care, at the discretion of the Pathologist or my treating physician.    6.   I consent to the photographing or videotaping of the operations or procedures to be performed, including appropriate portions of my body for medical, scientific, or educational purposes, provided my identity is not revealed by the pictures or by descriptive texts accompanying them.  If the procedure has been photographed/videotaped, the surgeon will obtain the original picture, image, videotape or CD.  The hospital will not be responsible for storage, release or maintenance of the picture, image, tape or CD.    7.   I consent to the presence of a  or observers in the operating room as deemed necessary by my physician or their designees.    8.   I recognize that in the event my procedure results in extended X-Ray/fluoroscopy time, I may develop a skin reaction.    9. If I have a Do Not Attempt  Resuscitation (DNAR) order in place, that status will be suspended while in the operating room, procedural suite, and during the recovery period unless otherwise explicitly stated by me (or a person authorized to consent on my behalf). The surgeon or my attending physician will determine when the applicable recovery period ends for purposes of reinstating the DNAR order.  10. Patients having a sterilization procedure: I understand that if the procedure is successful the results will be permanent and it will therefore be impossible for me to inseminate, conceive, or bear children.  I also understand that the procedure is intended to result in sterility, although the result has not been guaranteed.   11. I acknowledge that my physician has explained sedation/analgesia administration to me including the risk and benefits I consent to the administration of sedation/analgesia as may be necessary or desirable in the judgment of my physician.    I CERTIFY THAT I HAVE READ AND FULLY UNDERSTAND THE ABOVE CONSENT TO OPERATION and/or OTHER PROCEDURE.     ____________________________________  _________________________________        ______________________________  Signature of Patient    Signature of Responsible Person                Printed Name of Responsible Person                                      ____________________________________  _____________________________                ________________________________  Signature of Witness        Date  Time         Relationship to Patient    STATEMENT OF PHYSICIAN My signature below affirms that prior to the time of the procedure; I have explained to the patient and/or his/her legal representative, the risks and benefits involved in the proposed treatment and any reasonable alternative to the proposed treatment. I have also explained the risks and benefits involved in refusal of the proposed treatment and alternatives to the proposed treatment and have answered the patient's  questions. If I have a significant financial interest in a co-management agreement or a significant financial interest in any product or implant, or other significant relationship used in this procedure/surgery, I have disclosed this and had a discussion with my patient.     _____________________________________________________              _____________________________  (Signature of Physician)                                                                                         (Date)                                   (Time)  Patient Name: Andria Payton      : 1970      Printed: 2025     Medical Record #: [id number]                                      Page 1 of 1

## (undated) NOTE — LETTER
8/15/2024    Andria Payton        [address] [facility] [address]            Dear Andria Payton,      Our records indicate that you are due for an appointment for a Colonoscopy with Shad Vital MD. Our doctors are booking out about 3-6 months in advance for procedures.     Please call our office to schedule a phone screening appointment to plan for the procedure(s).   Your medical well-being is important to us.    If your insurance requires a referral, please call your primary care office to request one.      Thank you,      The Physicians and Staff at [facility]

## (undated) NOTE — LETTER
[address], [place]  Authorization for Invasive Procedures  1.  I hereby authorize Dr. Geovanny White , my physician and whomever may be designated as the doctor's assistant, to perform the following operation and/or procedur fever and allergic reactions, hemolytic reactions, transmission of disease such as hepatitis, AIDS, cytomegalovirus (CMV), and flluid overload.  In the event that I wish to have autologous transfusions of my own blood, or a directed donor transfusion, I regan Signature of Patient:  ________________________________________________ Date: _________Time: _________    Responsible person in case of minor or unconscious: _____________________________Relationship: ____________     Witness Signature: _______________

## (undated) NOTE — LETTER
[facility]  Administration of Anesthesia  1. I, Stevo Wheeler, or _________________________________ acting on her behalf, (Patient) (Dependent/Representative) request to receive anesthesia for my pending procedure/operation/treatment.   A bleeding, seizure, cardiac arrest and death. 7. AWARENESS: I understand that it is possible (but unlikely) to have explicit memory of events from the operating room while under general anesthesia.   8. ELECTROCONVULSIVE THERAPY PATIENTS: This consent serve below affirms that prior to the time of the procedure, I have explained to the patient and/or his/her guardian, the risks and benefits of undergoing anesthesia, as well as any reasonable alternatives.     ___________________________________________________

## (undated) NOTE — LETTER
[address], [place]  Authorization for Invasive Procedures  1.  I hereby authorize  Dr. Aquilino Kathleen , my physician and whomever may be designated as the doctor's assistant, to perform the following operation and/or procedure:  Chest [facility] fever and allergic reactions, hemolytic reactions, transmission of disease such as hepatitis, AIDS, cytomegalovirus (CMV), and flluid overload.  In the event that I wish to have autologous transfusions of my own blood, or a directed donor transfusion, I regan Signature of Patient:  ________________________________________________ Date: _________Time: _________    Responsible person in case of minor or unconscious: _____________________________Relationship: ____________     Witness Signature: ___________________

## (undated) NOTE — MR AVS SNAPSHOT
After Visit Summary   7/9/2020    Moraima Scott    MRN: [id number]           Visit Information     Date & Time  7/9/2020  4:15 PM Provider  [facility] LAB2 Department  [address] [facility]  Phone  [phone] 7/13/2020 8:00 AM [facility] Hematology Oncology    7/13/2020 8:30 AM [facility] [address] - Infusion    7/15/2020 10:00 AM [facility] [address] - Infusion    9[facility]16/2020 11:00 [facility]   or injury that are   non-life-threatening.   Also available by appointment     Average cost  $70*       [address]   Monday – Friday  10:00 am – 1

## (undated) NOTE — LETTER
3/26/2021              Rodolfo Duran        [address] [place] [address]         Dear Félix Marcano,    This letter is to inform you that our office has made several attempts to reach you by phone without success and unable to leave a voicemail.   We